# Patient Record
Sex: MALE | Race: WHITE | NOT HISPANIC OR LATINO | Employment: FULL TIME | URBAN - METROPOLITAN AREA
[De-identification: names, ages, dates, MRNs, and addresses within clinical notes are randomized per-mention and may not be internally consistent; named-entity substitution may affect disease eponyms.]

---

## 2017-02-23 ENCOUNTER — TELEPHONE (OUTPATIENT)
Dept: FAMILY MEDICINE | Facility: CLINIC | Age: 42
End: 2017-02-23

## 2017-02-23 NOTE — TELEPHONE ENCOUNTER
2/23/2017    Call Regarding ReattributionPhysical    Attempt 1    Message on voicemail     Comments:       Outreach   CC

## 2017-03-10 NOTE — TELEPHONE ENCOUNTER
3/10/2017    Call Regarding ReattributionPhysical    Attempt 2    Message on voicemail     Comments:           Outreach   loretta

## 2017-03-31 NOTE — TELEPHONE ENCOUNTER
Call Regarding ReattributionPhysical    Attempt 3    Message on voicemail     Comments:       Outreach   Lucia Beckwith

## 2017-08-16 ENCOUNTER — OFFICE VISIT (OUTPATIENT)
Dept: FAMILY MEDICINE | Facility: CLINIC | Age: 42
End: 2017-08-16
Payer: COMMERCIAL

## 2017-08-16 VITALS
OXYGEN SATURATION: 99 % | HEIGHT: 75 IN | BODY MASS INDEX: 25.4 KG/M2 | DIASTOLIC BLOOD PRESSURE: 70 MMHG | WEIGHT: 204.25 LBS | SYSTOLIC BLOOD PRESSURE: 118 MMHG | HEART RATE: 56 BPM | RESPIRATION RATE: 16 BRPM

## 2017-08-16 DIAGNOSIS — Z00.00 ROUTINE GENERAL MEDICAL EXAMINATION AT A HEALTH CARE FACILITY: Primary | ICD-10-CM

## 2017-08-16 DIAGNOSIS — R19.5 CHANGE IN CONSISTENCY OF STOOL: ICD-10-CM

## 2017-08-16 PROCEDURE — 99396 PREV VISIT EST AGE 40-64: CPT | Performed by: FAMILY MEDICINE

## 2017-08-16 NOTE — NURSING NOTE
"Chief Complaint   Patient presents with     Physical       Initial /70 (BP Location: Right arm, Patient Position: Chair, Cuff Size: Adult Large)  Pulse 56  Resp 16  Ht 6' 3\" (1.905 m)  Wt 204 lb 4 oz (92.6 kg)  SpO2 99%  BMI 25.53 kg/m2 Estimated body mass index is 25.53 kg/(m^2) as calculated from the following:    Height as of this encounter: 6' 3\" (1.905 m).    Weight as of this encounter: 204 lb 4 oz (92.6 kg).  Medication Reconciliation: complete     Yoana Cruz, ENRRIQUE      "

## 2017-08-16 NOTE — MR AVS SNAPSHOT
After Visit Summary   8/16/2017    Angie Lopes    MRN: [id number]           Patient Information     Date Of Birth          1975        Visit Information        Provider Department      8/16/2017 8:40 AM Preet Novak MD [facility]        Today's Diagnoses     Routine general medical examination at a health care facility    -  1    Change in consistency of stool          Care Instructions      Preventive Health Recommendations  Male Ages 40 to 49    Yearly exam:             See your health care provider every year in order to  o   Review health changes.   o   Discuss preventive care.    o   Review your medicines if your doctor has prescribed any.    You should be tested each year for STDs (sexually transmitted diseases) if you re at risk.     Have a cholesterol test every 5 years.     Have a colonoscopy (test for colon cancer) if someone in your family has had colon cancer or polyps before age 50.     After age 45, have a diabetes test (fasting glucose). If you are at risk for diabetes, you should have this test every 3 years.      Talk with your health care provider about whether or not a prostate cancer screening test (PSA) is right for you.    Shots: Get a flu shot each year. Get a tetanus shot every 10 years.     Nutrition:    Eat at least 5 servings of fruits and vegetables daily.     Eat whole-grain bread, whole-wheat pasta and brown rice instead of white grains and rice.     Talk to your provider about Calcium and Vitamin D.     Lifestyle    Exercise for at least 150 minutes a week (30 minutes a day, 5 days a week). This will help you control your weight and prevent disease.     Limit alcohol to one drink per day.     No smoking.     Wear sunscreen to prevent skin cancer.     See your dentist every six months for an exam and cleaning.              Follow-ups after your visit        Additional Services     COLORECTAL SURGERY REFERRAL       Your provider has  "referred you to: [facility] [phone]   [email or web address]  [facility] [phone]   [email or web address]    Referral Reason(s): change in stool consistency,   Special Concerns: None  This referral is: Elective (week +)  It is OK to leave a message on patient's voicemail.    Please be aware that coverage of these services is subject to the terms and limitations of your health insurance plan.  Call member services at your health plan with any benefit or coverage questions.      Please bring the following with you to your appointment:    (1) Any X-Rays, CTs or MRIs which have been performed.  Contact the facility where they were done to arrange for  prior to your scheduled appointment.    (2) List of current medications  (3) This referral request   (4) Any documents/labs given to you for this referral                  Who to contact     If you have questions or need follow up information about today's clinic visit or your schedule please contact [facility] directly at [phone].  Normal or non-critical lab and imaging results will be communicated to you by MyChart, letter or phone within 4 business days after the clinic has received the results. If you do not hear from us within 7 days, please contact the clinic through [facility] [employer] [facility] or phone. If you have a critical or abnormal lab result, we will notify you by phone as soon as possible.  Submit refill requests through [employer] or call your pharmacy and they will forward the refill request to us. Please allow 3 business days for your refill to be completed.          Additional Information About Your Visit        [employer] Information     [employer] lets you send messages to your doctor, view your test results, renew your prescriptions, schedule appointments and more. To sign up, go to [email or web address] . Click on \"Log in\" on the left side of the screen, which will take you to the " "Welcome page. Then click on \"Sign up Now\" on the right side of the page.     You will be asked to enter the access code listed below, as well as some personal information. Please follow the directions to create your username and password.     Your access code is: [email or web address]  Expires: 2017  9:13 AM     Your access code will  in 90 days. If you need help or a new code, please call your [facility] clinic or [phone].        Care EveryWhere ID     This is your Care EveryWhere ID. This could be used by other organizations to access your [facility] medical records  [id number]        Your Vitals Were     Pulse Respirations Height Pulse Oximetry BMI (Body Mass Index)       56 16 6' 3\" (1.905 m) 99% 25.53 kg/m2        Blood Pressure from Last 3 Encounters:   17 118/70   01/27/15 122/84   12 110/60    Weight from Last 3 Encounters:   17 204 lb 4 oz (92.6 kg)   01/27/15 199 lb (90.3 kg)   12 197 lb (89.4 kg)              We Performed the Following     COLORECTAL SURGERY REFERRAL        Primary Care Provider Office Phone # Fax #    Preet Margarette Novak [phone] [address] [place] [address]        Equal Access to Services     SEB MICHAEL : Hadnona youngo Sosami, waaxda luqadaha, qaybta kaalkarel hernandez. So [facility] [phone].    ATENCIÓN: Si habla español, tiene a drake disposición servicios gratuitos de asistencia lingüística. Wilfred al [phone].    We comply with applicable federal civil rights laws and [place] laws. We do not discriminate on the basis of race, color, national origin, age, disability sex, sexual orientation or gender identity.            Thank you!     Thank you for choosing [facility]  for your care. Our goal is always to provide you with excellent care. Hearing back from our patients is one way we can continue to improve our services. Please take a few minutes " to complete the written survey that you may receive in the mail after your visit with us. Thank you!             Your Updated Medication List - Protect others around you: Learn how to safely use, store and throw away your medicines at www.disposemymeds.org.          This list is accurate as of: 8/16/17  9:13 AM.  Always use your most recent med list.                   Brand Name Dispense Instructions for use Diagnosis    NO ACTIVE MEDICATIONS      .

## 2017-08-16 NOTE — PROGRESS NOTES
SUBJECTIVE:   CC: Angie Lopes is an 42 year old male who presents for preventative health visit.     Answers for HPI/ROS submitted by the patient on 8/16/2017   Annual Exam:  Getting at least 3 servings of Calcium per day:: Yes  Bi-annual eye exam:: NO  Dental care twice a year:: Yes  Sleep apnea or symptoms of sleep apnea:: None  Diet:: Regular (no restrictions)  Frequency of exercise:: 2-3 days/week  Taking medications regularly:: Not Applicable  Medication side effects:: Not applicable  Additional concerns today:: YES  PHQ-2 Score: 0  Duration of exercise:: 15-30 minutes      Today's PHQ-2 Score:   PHQ-2 ( 1999 Pfizer) 8/16/2017 1/27/2015   Q1: Little interest or pleasure in doing things 0 0   Q2: Feeling down, depressed or hopeless 0 0   PHQ-2 Score 0 0   Q1: Little interest or pleasure in doing things Not at all -   Q2: Feeling down, depressed or hopeless Not at all -   PHQ-2 Score 0 -     Abuse: Current or Past(Physical, Sexual or Emotional)- No  Do you feel safe in your environment - Yes  Social History   Substance Use Topics     Smoking status: Never Smoker     Smokeless tobacco: Never Used     Alcohol use Yes      Comment: occ. wine 3-4 times a week      The patient does not drink >3 drinks per day nor >7 drinks per week.    Last PSA: No results found for: PSA    Reviewed orders with patient. Reviewed health maintenance and updated orders accordingly - Yes       Reviewed and updated as needed this visit by clinical staff    Reviewed and updated as needed this visit by Provider    Sometime mucus in stool. Noticing sometime after passing stool. Could be from hemorrhoid. No blood in stool generally. Sometime bright red with wiping. No constipation or diarrhea. Sometime lower abdominal discomfort which is not new. No urinary symptoms.   Going on for few months.     ROS:  C: NEGATIVE for fever, chills, change in weight  I: NEGATIVE for worrisome rashes, moles or lesions  E: NEGATIVE for vision changes or  "irritation  ENT: NEGATIVE for ear, mouth and throat problems  R: NEGATIVE for significant cough or SOB  CV: NEGATIVE for chest pain, palpitations or peripheral edema  GI: NEGATIVE for nausea, abdominal pain, heartburn, or change in bowel habits   male: negative for dysuria, hematuria, decreased urinary stream, erectile dysfunction, urethral discharge  M: NEGATIVE for significant arthralgias or myalgia  N: NEGATIVE for weakness, dizziness or paresthesias  E: NEGATIVE for temperature intolerance, skin/hair changes  P: NEGATIVE for changes in mood or affect    OBJECTIVE:   /70 (BP Location: Right arm, Patient Position: Chair, Cuff Size: Adult Large)  Pulse 56  Resp 16  Ht 6' 3\" (1.905 m)  Wt 204 lb 4 oz (92.6 kg)  SpO2 99%  BMI 25.53 kg/m2  EXAM:  GENERAL: healthy, alert and no distress  EYES: Eyes grossly normal to inspection, PERRL and conjunctivae and sclerae normal  HENT: ear canals and TM's normal, nose and mouth without ulcers or lesions  NECK: no adenopathy, no asymmetry, masses, or scars and thyroid normal to palpation  RESP: lungs clear to auscultation - no rales, rhonchi or wheezes  CV: regular rate and rhythm, normal S1 S2, no S3 or S4, no murmur, click or rub, no peripheral edema and peripheral pulses strong  ABDOMEN: soft, nontender, no hepatosplenomegaly, no masses and bowel sounds normal   (male): normal male genitalia without lesions or urethral discharge, no hernia  MS: no gross musculoskeletal defects noted, no edema  SKIN: no suspicious lesions or rashes  NEURO: Normal strength and tone, mentation intact and speech normal  PSYCH: mentation appears normal, affect normal/bright    ASSESSMENT/PLAN:       1. Routine general medical examination at a health care facility    2. Change in consistency of stool  With more mucus in stool. History of hemorrhoid. Watch diet and if not improving, see colorectal. I suspect it more of diet related.   - COLORECTAL SURGERY " "REFERRAL      COUNSELING:  Reviewed preventive health counseling, as reflected in patient instructions  Special attention given to:        Regular exercise       Healthy diet/nutrition       Vision screening       Hearing screening           reports that he has never smoked. He has never used smokeless tobacco.      Estimated body mass index is 25.53 kg/(m^2) as calculated from the following:    Height as of this encounter: 6' 3\" (1.905 m).    Weight as of this encounter: 204 lb 4 oz (92.6 kg).         Counseling Resources:  ATP IV Guidelines  Pooled Cohorts Equation Calculator  FRAX Risk Assessment  ICSI Preventive Guidelines  Dietary Guidelines for Americans, 2010  USDA's MyPlate  ASA Prophylaxis  Lung CA Screening    Preet Novak MD, MD  [facility]  "

## 2020-02-03 ENCOUNTER — OFFICE VISIT (OUTPATIENT)
Dept: FAMILY MEDICINE | Facility: CLINIC | Age: 45
End: 2020-02-03
Payer: COMMERCIAL

## 2020-02-03 DIAGNOSIS — Z12.12 SCREENING FOR COLORECTAL CANCER: ICD-10-CM

## 2020-02-03 DIAGNOSIS — D17.1 LIPOMA OF BACK: ICD-10-CM

## 2020-02-03 DIAGNOSIS — N52.9 IMPOTENCE: ICD-10-CM

## 2020-02-03 DIAGNOSIS — H91.90 HEARING LOSS, UNSPECIFIED HEARING LOSS TYPE, UNSPECIFIED LATERALITY: ICD-10-CM

## 2020-02-03 DIAGNOSIS — Z12.11 SCREENING FOR COLORECTAL CANCER: ICD-10-CM

## 2020-02-03 DIAGNOSIS — Z00.00 ROUTINE GENERAL MEDICAL EXAMINATION AT A HEALTH CARE FACILITY: Primary | ICD-10-CM

## 2020-02-03 DIAGNOSIS — H00.015 HORDEOLUM EXTERNUM OF LEFT LOWER EYELID: ICD-10-CM

## 2020-02-03 LAB
ALBUMIN SERPL-MCNC: 4.1 G/DL (ref 3.4–5)
ALP SERPL-CCNC: 65 U/L (ref 40–150)
ALT SERPL W P-5'-P-CCNC: 24 U/L (ref 0–70)
ANION GAP SERPL CALCULATED.3IONS-SCNC: 6 MMOL/L (ref 3–14)
AST SERPL W P-5'-P-CCNC: 14 U/L (ref 0–45)
BILIRUB SERPL-MCNC: 0.7 MG/DL (ref 0.2–1.3)
BUN SERPL-MCNC: 19 MG/DL (ref 7–30)
CALCIUM SERPL-MCNC: 9.6 MG/DL (ref 8.5–10.1)
CHLORIDE SERPL-SCNC: 109 MMOL/L (ref 94–109)
CHOLEST SERPL-MCNC: 182 MG/DL
CO2 SERPL-SCNC: 26 MMOL/L (ref 20–32)
CREAT SERPL-MCNC: 1.24 MG/DL (ref 0.66–1.25)
GFR SERPL CREATININE-BSD FRML MDRD: 70 ML/MIN/{1.73_M2}
GLUCOSE SERPL-MCNC: 93 MG/DL (ref 70–99)
HDLC SERPL-MCNC: 62 MG/DL
LDLC SERPL CALC-MCNC: 109 MG/DL
NONHDLC SERPL-MCNC: 120 MG/DL
POTASSIUM SERPL-SCNC: 4 MMOL/L (ref 3.4–5.3)
PROT SERPL-MCNC: 7.5 G/DL (ref 6.8–8.8)
SODIUM SERPL-SCNC: 141 MMOL/L (ref 133–144)
TRIGL SERPL-MCNC: 54 MG/DL

## 2020-02-03 PROCEDURE — 80053 COMPREHEN METABOLIC PANEL: CPT | Performed by: PHYSICIAN ASSISTANT

## 2020-02-03 PROCEDURE — 99396 PREV VISIT EST AGE 40-64: CPT | Performed by: PHYSICIAN ASSISTANT

## 2020-02-03 PROCEDURE — 87389 HIV-1 AG W/HIV-1&-2 AB AG IA: CPT | Performed by: PHYSICIAN ASSISTANT

## 2020-02-03 PROCEDURE — 84403 ASSAY OF TOTAL TESTOSTERONE: CPT | Performed by: PHYSICIAN ASSISTANT

## 2020-02-03 PROCEDURE — 84270 ASSAY OF SEX HORMONE GLOBUL: CPT | Performed by: PHYSICIAN ASSISTANT

## 2020-02-03 PROCEDURE — 36415 COLL VENOUS BLD VENIPUNCTURE: CPT | Performed by: PHYSICIAN ASSISTANT

## 2020-02-03 PROCEDURE — 80061 LIPID PANEL: CPT | Performed by: PHYSICIAN ASSISTANT

## 2020-02-03 PROCEDURE — 99213 OFFICE O/P EST LOW 20 MIN: CPT | Mod: 25 | Performed by: PHYSICIAN ASSISTANT

## 2020-02-03 ASSESSMENT — ENCOUNTER SYMPTOMS
EYE PAIN: 1
SORE THROAT: 0
CONSTIPATION: 0
FREQUENCY: 0
DIZZINESS: 0
NAUSEA: 0
WEAKNESS: 0
HEMATOCHEZIA: 0
DIARRHEA: 0
PALPITATIONS: 0
SHORTNESS OF BREATH: 0
PARESTHESIAS: 0
JOINT SWELLING: 0
HEADACHES: 0
FEVER: 0
ABDOMINAL PAIN: 0
COUGH: 1
HEARTBURN: 0
ARTHRALGIAS: 0
DYSURIA: 0
NERVOUS/ANXIOUS: 0
CHILLS: 0
HEMATURIA: 0
MYALGIAS: 0

## 2020-02-03 ASSESSMENT — MIFFLIN-ST. JEOR: SCORE: 1911.74

## 2020-02-03 NOTE — PROGRESS NOTES
SUBJECTIVE:   CC: Angie Lopes is an 44 year old male who presents for preventative health visit.     Healthy Habits:     Getting at least 3 servings of Calcium per day:  Yes    Bi-annual eye exam:  NO    Dental care twice a year:  Yes    Sleep apnea or symptoms of sleep apnea:  None    Diet:  Regular (no restrictions)    Frequency of exercise:  1 day/week    Duration of exercise:  15-30 minutes    Taking medications regularly:  Yes    Medication side effects:  None    PHQ-2 Total Score: 0    Additional concerns today:  Yes  Eye Problem    Pertinent negatives include no nausea and no weakness.     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   20 years, have three children  For fun enjoys soccer, plays [id number]     Eye concern  4 weeks, left lower eyelid, seems to be improving  Has not done anything   Has improved 10-20%   Firm lump    Derm Problem  Patient would like to discuss area on back of Left shoulder   He has known lipoma to this area  Has seen dermatology in the past, they tried a steroid injection which did not help  Has been growing in size  Thinking he would like to have it removed    Sexual health   Getting an erection is OK, still has sex drive, firmness and stamina have decreased    Exercise    Reports he has lost momentum, needs to get back on track    Hearing Concern  Brought son in for physical   Was briefly tested by tech for hearing as well  Movies needs subtitles   Wife repeating self     Colorectal cancer screening  Pt's maternal grandmother has h/o colon cancer  Pt would like to discuss plan for colon cancer screening   No first degree relatives with history of colorectal cancer  Patient without symptoms     Today's PHQ-2 Score:   PHQ-2 ( 1999 Pfizer) 2/3/2020   Q1: Little interest or pleasure in doing things 0   Q2: Feeling down, depressed or hopeless 0   PHQ-2 Score 0   Q1: Little interest or  "pleasure in doing things Not at all   Q2: Feeling down, depressed or hopeless Not at all   PHQ-2 Score 0       Abuse: Current or Past(Physical, Sexual or Emotional)- No  Do you feel safe in your environment? Yes        Social History     Tobacco Use     Smoking status: Never Smoker     Smokeless tobacco: Never Used   Substance Use Topics     Alcohol use: Yes     Comment: occ. wine 3-4 times a week          Alcohol Use 2/3/2020   Prescreen: >3 drinks/day or >7 drinks/week? No   Prescreen: >3 drinks/day or >7 drinks/week? -       Last PSA: No results found for: PSA    Reviewed orders with patient. Reviewed health maintenance and updated orders accordingly - Yes      Reviewed and updated as needed this visit by clinical staff  Tobacco  Allergies  Meds  Med Hx  Surg Hx  Fam Hx  Soc Hx        Reviewed and updated as needed this visit by Provider          Review of Systems   Constitutional: Negative for chills and fever.   HENT: Positive for hearing loss. Negative for congestion, ear pain and sore throat.    Eyes: Positive for pain. Negative for visual disturbance.   Respiratory: Positive for cough. Negative for shortness of breath.    Cardiovascular: Negative for chest pain, palpitations and peripheral edema.   Gastrointestinal: Negative for abdominal pain, constipation, diarrhea, heartburn, hematochezia and nausea.   Genitourinary: Negative for discharge, dysuria, frequency, genital sores, hematuria, impotence and urgency.   Musculoskeletal: Negative for arthralgias, joint swelling and myalgias.   Skin: Negative for rash.   Neurological: Negative for dizziness, weakness, headaches and paresthesias.   Psychiatric/Behavioral: Negative for mood changes. The patient is not nervous/anxious.        OBJECTIVE:   BP (P) 120/84   Pulse (P) 56   Temp (P) 98  F (36.7  C) (Oral)   Ht (P) 1.899 m (6' 2.75\")   Wt (P) 94 kg (207 lb 4 oz)   SpO2 (P) 98%   BMI (P) 26.08 kg/m      Physical Exam  GENERAL: healthy, alert and no " distress  EYES: Eyes grossly normal to inspection, PERRL and conjunctivae and sclerae normal  HENT: ear canals and TM's normal, nose and mouth without ulcers or lesions  NECK: no adenopathy, no asymmetry, masses, or scars and thyroid normal to palpation  RESP: lungs clear to auscultation - no rales, rhonchi or wheezes  CV: regular rate and rhythm, normal S1 S2, no S3 or S4, no murmur, click or rub, no peripheral edema and peripheral pulses strong  ABDOMEN: soft, nontender, no hepatosplenomegaly, no masses and bowel sounds normal  MS: no gross musculoskeletal defects noted, no edema  SKIN: no suspicious lesions or rashes  NEURO: Normal strength and tone, mentation intact and speech normal  PSYCH: mentation appears normal, affect normal/bright    Diagnostic Test Results:  Labs reviewed in Epic  No results found for this or any previous visit (from the past 24 hour(s)).    ASSESSMENT/PLAN:   1. Routine general medical examination at a health care facility  - Lipid panel reflex to direct LDL Fasting  - Testosterone Free and Total  - Comprehensive metabolic panel (BMP + Alb, Alk Phos, ALT, AST, Total. Bili, TP)  - AUDIOLOGY ADULT REFERRAL; Future  - HIV Antigen Antibody Combo    2. Hordeolum externum of left lower eyelid  - Has been improving without cares  - Recommend warm compress treatment a few times daily   - Return to care if worsening or not improving     3. Lipoma of back  - Has seen derm for this in the past  - Not needing referral today as already established   - Will follow up with derm for further evaluation and possible removal    4. Impotence  - Wants to check testosterone today  - Checking after 8 AM not always reliable, this was reviewed with patient who wishes to check this morning anyway  - If returns abnormal will have patient return for retest before 8 AM in the future  - If normal, will think about trying ED medication such as Sildenafil or Cialis. Discussed both options with patient today. I do  "think he is leaning more towards Cialis as it offers greater spontaneity.     5. Hearing loss, unspecified hearing loss type, unspecified laterality  - Has hearing concerns, reports abnormal informal screen while at his son's physical recently  - Wife has been concerned he is not hearing well     6. Screening for colorectal cancer  - No first degree relatives with colorectal cancer, maternal grandmother did have CRC   - Not having any symptoms  - Discussed FIT test for reassurance, otherwise waiting until age 50 for routine screening       COUNSELING:   Reviewed preventive health counseling, as reflected in patient instructions       Regular exercise       Healthy diet/nutrition       Hearing screening       Colon cancer screening    Estimated body mass index is 26.08 kg/m  (pended) as calculated from the following:    Height as of this encounter: (P) 1.899 m (6' 2.75\").    Weight as of this encounter: (P) 94 kg (207 lb 4 oz).     Weight management plan: Discussed healthy diet and exercise guidelines     reports that he has never smoked. He has never used smokeless tobacco.      Counseling Resources:  ATP IV Guidelines  Pooled Cohorts Equation Calculator  FRAX Risk Assessment  ICSI Preventive Guidelines  Dietary Guidelines for Americans, 2010  USDA's MyPlate  ASA Prophylaxis  Lung CA Screening    Miguel A Clark PA-C  [facility]  "

## 2020-02-03 NOTE — PATIENT INSTRUCTIONS
Patient Education     Sty (or Stye)  A sty is an infection of the oil gland of the eyelid. It may develop into a small pocket of pus (an abscess). This can cause pain, redness, and swelling. In early stages, a sty is treated with antibiotic cream, eye drops, or a small towel soaked in warm water (a warm compress). More severe cases may need to be opened and drained by a healthcare provider.  Home care    Eye drops or ointment are usually prescribed to treat the infection. Use these as directed.     Artificial tears may also be used to lubricate the eye and make it more comfortable. You can buy these over the counter without a prescription. Talk with your healthcare provider before using any over-the-counter treatment for a sty.    Apply a warm, damp towel to the affected eye for at least 5 minutes, 3 to 4 times a day for a week. Warm compresses open the pores and speed the healing. But if the compresses are too hot, they may burn your eyelid.    Sometimes the sty will drain with this treatment alone. If this happens, keep using the antibiotic until all the redness and swelling are gone.    Wash your hands before and after touching the infected eyelid to avoid spreading the infection.    Don t squeeze or try to break open the sty.  Follow-up care  Follow up with your healthcare provider, or as advised.   When to seek medical advice  Call your healthcare provider right away if any of these occur:    Increase in swelling or redness around the eyelid after 48 to 72 hours    Increase in eye pain or the eyelid blisters    Increase in warmth--the eyelid feels hot    Drainage of blood or thick pus from the sty    Blister on the eyelid    Inability to open the eyelid due to swelling    Fever of 100.4 F (38 C) or above, or as directed by your provider    Vision changes    Headache or stiff neck    The sty comes back  Date Last Reviewed: 8/1/2017 2000-2019 [employer]. [address], [place]  [address]. All rights reserved. This information is not intended as a substitute for professional medical care. Always follow your healthcare professional's instructions.

## 2020-02-04 LAB — HIV 1+2 AB+HIV1 P24 AG SERPL QL IA: NONREACTIVE

## 2020-02-05 LAB
SHBG SERPL-SCNC: 40 NMOL/L (ref 11–80)
TESTOST FREE SERPL-MCNC: 11.71 NG/DL (ref 4.7–24.4)
TESTOST SERPL-MCNC: 608 NG/DL (ref 240–950)

## 2020-02-10 ENCOUNTER — HEALTH MAINTENANCE LETTER (OUTPATIENT)
Age: 45
End: 2020-02-10

## 2020-05-14 ENCOUNTER — HOSPITAL ENCOUNTER (EMERGENCY)
Facility: CLINIC | Age: 45
Discharge: HOME | End: 2020-05-14
Attending: EMERGENCY MEDICINE | Admitting: EMERGENCY MEDICINE
Payer: COMMERCIAL

## 2020-05-14 VITALS
SYSTOLIC BLOOD PRESSURE: 135 MMHG | TEMPERATURE: 97 F | HEART RATE: 76 BPM | DIASTOLIC BLOOD PRESSURE: 62 MMHG | OXYGEN SATURATION: 98 % | RESPIRATION RATE: 16 BRPM

## 2020-05-14 DIAGNOSIS — S81.812A LACERATION OF LEFT LOWER LEG, INITIAL ENCOUNTER: ICD-10-CM

## 2020-05-14 PROCEDURE — 12002 RPR S/N/AX/GEN/TRNK2.6-7.5CM: CPT | Mod: Z6 | Performed by: EMERGENCY MEDICINE

## 2020-05-14 PROCEDURE — 99283 EMERGENCY DEPT VISIT LOW MDM: CPT | Mod: Z6 | Performed by: EMERGENCY MEDICINE

## 2020-05-14 PROCEDURE — 25000125 ZZHC RX 250: Performed by: EMERGENCY MEDICINE

## 2020-05-14 PROCEDURE — 90471 IMMUNIZATION ADMIN: CPT | Performed by: EMERGENCY MEDICINE

## 2020-05-14 PROCEDURE — 90715 TDAP VACCINE 7 YRS/> IM: CPT | Performed by: EMERGENCY MEDICINE

## 2020-05-14 PROCEDURE — 25000128 H RX IP 250 OP 636: Performed by: EMERGENCY MEDICINE

## 2020-05-14 PROCEDURE — 99284 EMERGENCY DEPT VISIT MOD MDM: CPT | Mod: 25 | Performed by: EMERGENCY MEDICINE

## 2020-05-14 PROCEDURE — 12002 RPR S/N/AX/GEN/TRNK2.6-7.5CM: CPT | Performed by: EMERGENCY MEDICINE

## 2020-05-14 RX ORDER — LIDOCAINE HYDROCHLORIDE AND EPINEPHRINE 10; 10 MG/ML; UG/ML
30 INJECTION, SOLUTION INFILTRATION; PERINEURAL ONCE
Status: COMPLETED | OUTPATIENT
Start: 2020-05-14 | End: 2020-05-14

## 2020-05-14 RX ADMIN — CLOSTRIDIUM TETANI TOXOID ANTIGEN (FORMALDEHYDE INACTIVATED), CORYNEBACTERIUM DIPHTHERIAE TOXOID ANTIGEN (FORMALDEHYDE INACTIVATED), BORDETELLA PERTUSSIS TOXOID ANTIGEN (GLUTARALDEHYDE INACTIVATED), BORDETELLA PERTUSSIS FILAMENTOUS HEMAGGLUTININ ANTIGEN (FORMALDEHYDE INACTIVATED), BORDETELLA PERTUSSIS PERTACTIN ANTIGEN, AND BORDETELLA PERTUSSIS FIMBRIAE 2/3 ANTIGEN 0.5 ML: 5; 2; 2.5; 5; 3; 5 INJECTION, SUSPENSION INTRAMUSCULAR at 18:30

## 2020-05-14 RX ADMIN — LIDOCAINE HYDROCHLORIDE AND EPINEPHRINE 30 ML: 10; 10 INJECTION, SOLUTION INFILTRATION; PERINEURAL at 18:31

## 2020-05-14 NOTE — ED AVS SNAPSHOT
[facility], [place], Emergency Department  [address]  [place] [address]  Phone:  [phone]  Fax:  [phone]                                    Angie Lopes   MRN: [id number]    Department:  [facility], Emergency Department   Date of Visit:  5/14/2020           After Visit Summary Signature Page    I have received my discharge instructions, and my questions have been answered. I have discussed any challenges I see with this plan with the nurse or doctor.    ..........................................................................................................................................  Patient/Patient Representative Signature      ..........................................................................................................................................  Patient Representative Print Name and Relationship to Patient    ..................................................               ................................................  Date                                   Time    ..........................................................................................................................................  Reviewed by Signature/Title    ...................................................              ..............................................  Date                                               Time          22EPIC Rev 08/18

## 2020-05-14 NOTE — ED PROVIDER NOTES
[facility] EMERGENCY DEPARTMENT ([facility])    5/14/20        History     Chief Complaint   Patient presents with     Laceration     The history is provided by the patient and medical records.     Angie Lopes is a 45 year old male with no significant past medical history who presents here to the Emergency Department due to a laceration to his left lower leg.  Patient reports that he was chopping wood with an axe earlier today when he accidentally struck his left lower leg causing a laceration to the area.  On arrival to the ED patient's bleeding is controlled.  Patient denies any other injuries.  He states that the laceration is the only injury that he sustained.  He is able to ambulate and bear weight on the leg.  He is also able to move his leg without issue.    I have reviewed the Medications, Allergies, Past Medical and Surgical History, and Social History in the Simple [employer] system.  PAST MEDICAL HISTORY: History reviewed. No pertinent past medical history.    PAST SURGICAL HISTORY:   Past Surgical History:   Procedure Laterality Date     VASECTOMY  2004       Past medical history, past surgical history, medications, and allergies were reviewed with the patient. Additional pertinent items: None    FAMILY HISTORY:   Family History   Problem Relation Age of Onset     Lipids Mother      Hypertension Mother      Cancer - colorectal Maternal Grandmother        SOCIAL HISTORY:   Social History     Tobacco Use     Smoking status: Never Smoker     Smokeless tobacco: Never Used   Substance Use Topics     Alcohol use: Yes     Comment: occ. wine 3-4 times a week      Social history was reviewed with the patient. Additional pertinent items: None      Discharge Medication List as of 5/14/2020  7:08 PM      CONTINUE these medications which have NOT CHANGED    Details   NO ACTIVE MEDICATIONS ., Historical              No Known Allergies     Review of Systems  A complete review of systems was performed with pertinent  positives and negatives noted in the HPI, and all other systems negative.    Physical Exam   BP: 135/60  Pulse: 77  Temp: 96.7  F (35.9  C)  Resp: 18  SpO2: 97 %      Physical Exam  Constitutional:       General: He is not in acute distress.     Appearance: He is well-developed. He is not diaphoretic.      Comments: Comfortably resting, lying in bed, NAD, nondiaphoretic, lucid, fully conversant, no  respiratory distress, alert and oriented.     HENT:      Head: Normocephalic and atraumatic.   Eyes:      General: No scleral icterus.  Neck:      Musculoskeletal: Normal range of motion and neck supple.   Cardiovascular:      Rate and Rhythm: Normal rate.      Pulses:           Dorsalis pedis pulses are 2+ on the right side and 2+ on the left side.        Posterior tibial pulses are 2+ on the right side and 2+ on the left side.   Pulmonary:      Effort: Pulmonary effort is normal.   Musculoskeletal: Normal range of motion.   Skin:     General: Skin is warm and dry.      Capillary Refill: Capillary refill takes less than 2 seconds.      Findings: Laceration present. No rash.          Neurological:      Mental Status: He is alert and oriented to person, place, and time.         ED Course        Procedures             Narrative: Procedure: Laceration Repair        LACERATION:  A simple clean 5 cm laceration.      LOCATION:  Left calf      FUNCTION:  Distally sensation, circulation, motor and tendon function are intact.      ANESTHESIA:  Local using lido w/epi total of 10 mLs      PREPARATION:  Irrigation with Normal Saline      DEBRIDEMENT:  no debridement      CLOSURE:  Wound was closed with One Layer.  Skin closed with 7 x 3.0 Ethylon using vertical mattress sutures.        Wound inspected under direct bright light with good visualization. Area with linear  laceration across soft tissue through adipose without exposure of muscle belly or  tendon_. No overt foreign body. Area hemostatic. Neurovascular exam congruent  with  above. Area extensively irrigated with sterile normal saline under pressure. Laceration  repaired in simple fashion as below (please see procedure note for further details)_.  Patient tolerated procedure well and neurovascular exam intact and unchanged post  repair with intact distal pulses and cap refill_. Cautious return precautions discussed w/  full understanding. Wound care discussed. Prompt follow up with primary care  physician discussed and return for suture removal.                  No results found for this or any previous visit (from the past 24 hour(s)).  Medications   lidocaine 1% with EPINEPHrine 1:100,000 injection 30 mL (30 mLs Intradermal Given 5/14/20 1831)   Tdap (tetanus-diphtheria-acell pertussis) (ADACEL) injection 0.5 mL (0.5 mLs Intramuscular Given 5/14/20 1830)             Assessments & Plan (with Medical Decision Making)     This is a 45-year-old male coming into the emergency room with a 5 cm laceration to the left mid calf.  The laceration is full-thickness and goes all the way to the tendon belly.  There is no active bleeding at this time.  Patient has good pulse motor and sensory throughout.  There is no debris within the wound.  The wound was irrigated and anesthetized.  7 sutures were placed with vertical mattress as well as simple.  The patient was provided with aftercare instructions and reasons to return to the emergency room.  His wife is a nurse practitioner and will most likely reassess the wound and if she finds it too complicated they will bring him back to the emergency room for suture removal or reassessment.    Pt was discharged home/self-care.  PT was provided written discharge instructions. Additionally verbal instructions were given and discussed with patient.  PT was asked to return to the ED immediately for any new or concerning symptoms.  Pt was in agreement, endorsed understanding, and questions were answered.       I have reviewed the nursing notes.    I have  "reviewed the findings, diagnosis, plan and need for follow up with the patient.    Discharge Medication List as of 5/14/2020  7:08 PM          Final diagnoses:   Laceration of left lower leg, initial encounter     IJarvis, am serving as a trained medical scribe to document services personally performed by Fede Cespedes MD, based on the provider's statements to me.   IFede MD, was physically present and have reviewed and verified the accuracy of this note documented by Jarvis Hall.    \"This dictation was performed with the assistance of voice recognition software and may contain inadvertant transcription  errors,  omissions and/or  inadvertent word substitution.\" --Fede Cespedes MD     5/14/2020   [facility], [place], EMERGENCY DEPARTMENT     Fede Cespedes MD  05/14/20 1921       Fede Cespedes MD  05/14/20 1921    "

## 2020-05-15 NOTE — DISCHARGE INSTRUCTIONS
Please make an appointment to follow up with Your Primary Care Provider for suture removal in 14 days.  At the 14-day tim remove every other suture for day or 2 and then the rest of then, come out so long as the wound is not pulling apart.

## 2020-11-14 ENCOUNTER — HEALTH MAINTENANCE LETTER (OUTPATIENT)
Age: 45
End: 2020-11-14

## 2020-11-23 ENCOUNTER — TRANSFERRED RECORDS (OUTPATIENT)
Dept: HEALTH INFORMATION MANAGEMENT | Facility: CLINIC | Age: 45
End: 2020-11-23

## 2021-04-03 ENCOUNTER — HEALTH MAINTENANCE LETTER (OUTPATIENT)
Age: 46
End: 2021-04-03

## 2021-06-02 ENCOUNTER — OFFICE VISIT (OUTPATIENT)
Dept: FAMILY MEDICINE | Facility: CLINIC | Age: 46
End: 2021-06-02
Payer: COMMERCIAL

## 2021-06-02 VITALS
HEART RATE: 70 BPM | RESPIRATION RATE: 16 BRPM | SYSTOLIC BLOOD PRESSURE: 120 MMHG | DIASTOLIC BLOOD PRESSURE: 72 MMHG | HEIGHT: 73 IN | BODY MASS INDEX: 28.1 KG/M2 | WEIGHT: 212 LBS | TEMPERATURE: 97.2 F | OXYGEN SATURATION: 97 %

## 2021-06-02 DIAGNOSIS — R00.2 PALPITATIONS: ICD-10-CM

## 2021-06-02 DIAGNOSIS — Z00.00 ROUTINE GENERAL MEDICAL EXAMINATION AT A HEALTH CARE FACILITY: Primary | ICD-10-CM

## 2021-06-02 DIAGNOSIS — N52.9 ERECTILE DYSFUNCTION, UNSPECIFIED ERECTILE DYSFUNCTION TYPE: ICD-10-CM

## 2021-06-02 DIAGNOSIS — E07.9 ASYMMETRICAL THYROID: ICD-10-CM

## 2021-06-02 DIAGNOSIS — Z11.59 NEED FOR HEPATITIS C SCREENING TEST: ICD-10-CM

## 2021-06-02 PROCEDURE — 99396 PREV VISIT EST AGE 40-64: CPT | Performed by: PHYSICIAN ASSISTANT

## 2021-06-02 PROCEDURE — 99213 OFFICE O/P EST LOW 20 MIN: CPT | Mod: 25 | Performed by: PHYSICIAN ASSISTANT

## 2021-06-02 RX ORDER — SILDENAFIL CITRATE 20 MG/1
TABLET ORAL
Qty: 20 TABLET | Refills: 0 | Status: SHIPPED | OUTPATIENT
Start: 2021-06-02 | End: 2021-10-22

## 2021-06-02 ASSESSMENT — ENCOUNTER SYMPTOMS
HEMATURIA: 0
COUGH: 0
NAUSEA: 0
SHORTNESS OF BREATH: 0
ABDOMINAL PAIN: 0
EYE PAIN: 0
CHILLS: 0
PARESTHESIAS: 0
PALPITATIONS: 1
JOINT SWELLING: 0
HEADACHES: 1
HEMATOCHEZIA: 0
DYSURIA: 0
DIARRHEA: 0
FEVER: 0
HEARTBURN: 0
SORE THROAT: 0
FREQUENCY: 0
MYALGIAS: 0
ARTHRALGIAS: 0
WEAKNESS: 0
CONSTIPATION: 0
DIZZINESS: 0

## 2021-06-02 ASSESSMENT — MIFFLIN-ST. JEOR: SCORE: 1887.57

## 2021-06-02 NOTE — PATIENT INSTRUCTIONS
Preventive Health Recommendations  Male Ages 40 to 49    Yearly exam:             See your health care provider every year in order to  o   Review health changes.   o   Discuss preventive care.    o   Review your medicines if your doctor has prescribed any.    You should be tested each year for STDs (sexually transmitted diseases) if you re at risk.     Have a cholesterol test every 5 years.     Have a colonoscopy (test for colon cancer) if someone in your family has had colon cancer or polyps before age 50.     After age 45, have a diabetes test (fasting glucose). If you are at risk for diabetes, you should have this test every 3 years.      Talk with your health care provider about whether or not a prostate cancer screening test (PSA) is right for you.    Shots: Get a flu shot each year. Get a tetanus shot every 10 years.     Nutrition:    Eat at least 5 servings of fruits and vegetables daily.     Eat whole-grain bread, whole-wheat pasta and brown rice instead of white grains and rice.     Get adequate Calcium and Vitamin D.     Lifestyle    Exercise for at least 150 minutes a week (30 minutes a day, 5 days a week). This will help you control your weight and prevent disease.     Limit alcohol to one drink per day.     No smoking.     Wear sunscreen to prevent skin cancer.     See your dentist every six months for an exam and cleaning.        To schedule heart holter monitor please call [phone]

## 2021-06-02 NOTE — PROGRESS NOTES
SUBJECTIVE:   CC: Angie Lopes is an 46 year old male who presents for preventative health visit.   Patient has been advised of split billing requirements and indicates understanding: Yes  Healthy Habits:     Getting at least 3 servings of Calcium per day:  NO    Bi-annual eye exam:  NO    Dental care twice a year:  Yes    Sleep apnea or symptoms of sleep apnea:  Sleep apnea    Diet:  Regular (no restrictions)    Frequency of exercise:  2-3 days/week    Duration of exercise:  30-45 minutes    Taking medications regularly:  Not Applicable    Barriers to taking medications:  None    Medication side effects:  Not applicable    PHQ-2 Total Score: 0    Additional concerns today:  Yes    Angie is a very pleasant 46 year old male with a history of scaphoid fracture and hemorrhoids who is presenting for routine physical    Overall he is doing well. Was laid off during pandemic but ended up being a good thing as he found a new job that has been a great deal better than previous career. Works in IT.   His three children have been at home during the pandemic. Has one high school aged child and others are college or post college. Has been going well with everyone back home.     Reports he is feeling his age more these days. Has some additional concerns today:    Palpitations: reporting episodes of palpitations in March and April 2021. Occurred at the end of the day. Feeling like a loud thump and irregular. Heart felt like racing. Thinks some correlation to stress. Does wear smart watch which showed a fib. This was concerning as his father was recently diagnosed with a fib. More recently Angie has not been having any of these symptoms.     ED: able to obtain erection but reports firmness has been an issue. We discussed this last year but did not start medication.     HM: due for repeat colonoscopy in 2025. Had some rectal bleeding preceding last screening. Two polyps. Also hemorrhoids. Bleeding thought to be due to  hemorrhoids.         Today's PHQ-2 Score:   PHQ-2 ( 1999 Pfizer) 6/2/2021   Q1: Little interest or pleasure in doing things 0   Q2: Feeling down, depressed or hopeless 0   PHQ-2 Score 0   Q1: Little interest or pleasure in doing things Not at all   Q2: Feeling down, depressed or hopeless Not at all   PHQ-2 Score 0       Abuse: Current or Past(Physical, Sexual or Emotional)- No  Do you feel safe in your environment? Yes    Have you ever done Advance Care Planning? (For example, a Health Directive, POLST, or a discussion with a medical provider or your loved ones about your wishes): No, advance care planning information given to patient to review.  Advanced care planning was discussed at today's visit.    Social History     Tobacco Use     Smoking status: Never Smoker     Smokeless tobacco: Never Used   Substance Use Topics     Alcohol use: Yes     Comment: occ. wine 3-4 times a week      If you drink alcohol do you typically have >3 drinks per day or >7 drinks per week? No    Alcohol Use 6/2/2021   Prescreen: >3 drinks/day or >7 drinks/week? No   Prescreen: >3 drinks/day or >7 drinks/week? -   No flowsheet data found.    Last PSA: No results found for: PSA    Reviewed orders with patient. Reviewed health maintenance and updated orders accordingly - Yes      Reviewed and updated as needed this visit by clinical staff  Tobacco  Allergies  Meds   Med Hx  Surg Hx  Fam Hx  Soc Hx        Reviewed and updated as needed this visit by Provider                  Review of Systems   Constitutional: Negative for chills and fever.   HENT: Positive for hearing loss. Negative for congestion, ear pain and sore throat.    Eyes: Negative for pain and visual disturbance.   Respiratory: Negative for cough and shortness of breath.    Cardiovascular: Positive for palpitations. Negative for chest pain and peripheral edema.   Gastrointestinal: Negative for abdominal pain, constipation, diarrhea, heartburn, hematochezia and nausea.  "  Genitourinary: Positive for impotence. Negative for discharge, dysuria, frequency, genital sores, hematuria and urgency.   Musculoskeletal: Negative for arthralgias, joint swelling and myalgias.   Skin: Negative for rash.   Neurological: Positive for headaches. Negative for dizziness, weakness and paresthesias.   Psychiatric/Behavioral: Negative for mood changes.     OBJECTIVE:   /72 (BP Location: Left arm, Patient Position: Sitting, Cuff Size: Adult Regular)   Pulse 70   Temp 97.2  F (36.2  C) (Tympanic)   Resp 16   Ht 1.842 m (6' 0.5\")   Wt 96.2 kg (212 lb)   SpO2 97%   BMI 28.36 kg/m      Physical Exam  Vitals signs and nursing note reviewed.   Constitutional:       Appearance: Normal appearance.   HENT:      Head: Normocephalic and atraumatic.      Right Ear: Tympanic membrane, ear canal and external ear normal.      Left Ear: Tympanic membrane, ear canal and external ear normal.      Mouth/Throat:      Mouth: Mucous membranes are moist.      Pharynx: No posterior oropharyngeal erythema.   Eyes:      Conjunctiva/sclera: Conjunctivae normal.      Pupils: Pupils are equal, round, and reactive to light.   Neck:      Musculoskeletal: Neck supple. No muscular tenderness.      Comments: Thyroid asymmetry R > L, non tender  Cardiovascular:      Rate and Rhythm: Normal rate and regular rhythm.      Heart sounds: Normal heart sounds.   Pulmonary:      Effort: Pulmonary effort is normal.      Breath sounds: Normal breath sounds.   Abdominal:      General: Abdomen is flat. Bowel sounds are normal.      Palpations: Abdomen is soft.      Tenderness: There is no abdominal tenderness.   Musculoskeletal: Normal range of motion.   Skin:     General: Skin is warm and dry.   Neurological:      General: No focal deficit present.      Mental Status: He is alert.   Psychiatric:         Mood and Affect: Mood normal.         Behavior: Behavior normal.         Diagnostic Test Results:  Labs reviewed in Epic  No results " found for this or any previous visit (from the past 24 hour(s)).    ASSESSMENT/PLAN:   Angie was seen today for physical.    Diagnoses and all orders for this visit:    Routine general medical examination at a health care facility  -     REVIEW OF HEALTH MAINTENANCE PROTOCOL ORDERS  -     Lipid panel reflex to direct LDL Fasting; Future  -     Vitamin D Deficiency; Future  -     Comprehensive metabolic panel (BMP + Alb, Alk Phos, ALT, AST, Total. Bili, TP); Future  -     **Testosterone Free and Total FUTURE anytime; Future  -     **TSH with free T4 reflex FUTURE anytime; Future  -     **CBC with platelets FUTURE anytime; Future  -     Hepatitis C antibody; Future    Palpitations  -     Leadless EKG Monitor 3 to 7 Days; Future  -     OFFICE/OUTPT VISIT,EST,LEVL III    Erectile dysfunction, unspecified erectile dysfunction type  -     Lipid panel reflex to direct LDL Fasting; Future  -     Comprehensive metabolic panel (BMP + Alb, Alk Phos, ALT, AST, Total. Bili, TP); Future  -     **Testosterone Free and Total FUTURE anytime; Future  -     sildenafil (REVATIO) 20 MG tablet; Take 1-5 tablets 30-60 minutes prior to activity. Do not take with nitroglycerin, doxazosin, or terazosin.  -     **TSH with free T4 reflex FUTURE anytime; Future  -     OFFICE/OUTPT VISIT,EST,LEVL III    Need for hepatitis C screening test  -     Hepatitis C antibody; Future    Asymmetrical thyroid  -     **TSH with free T4 reflex FUTURE anytime; Future      Palpitations have resolved but were quite bothersome in the months of March and April 2021. Will continue to monitor symptoms. Seems to have some relationship to stress. Deferred EKG in clinic today as not reporting symptoms. Heart sounds and vitals normal on exam. Will place order for Zio patch -- Angie will schedule if symptoms return. To ED if any chest pain, shortness of breath, focal neuro deficit, dizziness, syncope.     For ED will try course of Sildenafil. If working well I will send  "in a longer term supply.     Thyroid asymmetry appreciated on exam with R > L. Will place order for TSH. Could consider US in the future.     Future orders for lipid panel, vit d, CMP, testosterone, TSH, CBC, hep c       COUNSELING:   Reviewed preventive health counseling, as reflected in patient instructions       Regular exercise       Healthy diet/nutrition       Consider Hep C screening for all patients one time for ages 18-79 years    Estimated body mass index is 28.36 kg/m  as calculated from the following:    Height as of this encounter: 1.842 m (6' 0.5\").    Weight as of this encounter: 96.2 kg (212 lb).     Weight management plan: Discussed healthy diet and exercise guidelines    He reports that he has never smoked. He has never used smokeless tobacco.      Counseling Resources:  ATP IV Guidelines  Pooled Cohorts Equation Calculator  FRAX Risk Assessment  ICSI Preventive Guidelines  Dietary Guidelines for Americans, 2010  USDA's MyPlate  ASA Prophylaxis  Lung CA Screening    Miguel A Clark PA-C  [facility]  "

## 2021-06-11 DIAGNOSIS — E07.9 ASYMMETRICAL THYROID: ICD-10-CM

## 2021-06-11 DIAGNOSIS — Z00.00 ROUTINE GENERAL MEDICAL EXAMINATION AT A HEALTH CARE FACILITY: ICD-10-CM

## 2021-06-11 DIAGNOSIS — Z11.59 NEED FOR HEPATITIS C SCREENING TEST: ICD-10-CM

## 2021-06-11 DIAGNOSIS — N52.9 ERECTILE DYSFUNCTION, UNSPECIFIED ERECTILE DYSFUNCTION TYPE: ICD-10-CM

## 2021-06-11 LAB
ALBUMIN SERPL-MCNC: 3.8 G/DL (ref 3.4–5)
ALP SERPL-CCNC: 77 U/L (ref 40–150)
ALT SERPL W P-5'-P-CCNC: 28 U/L (ref 0–70)
ANION GAP SERPL CALCULATED.3IONS-SCNC: 7 MMOL/L (ref 3–14)
AST SERPL W P-5'-P-CCNC: 15 U/L (ref 0–45)
BILIRUB SERPL-MCNC: 0.3 MG/DL (ref 0.2–1.3)
BUN SERPL-MCNC: 16 MG/DL (ref 7–30)
CALCIUM SERPL-MCNC: 9.1 MG/DL (ref 8.5–10.1)
CHLORIDE SERPL-SCNC: 107 MMOL/L (ref 94–109)
CHOLEST SERPL-MCNC: 172 MG/DL
CO2 SERPL-SCNC: 28 MMOL/L (ref 20–32)
CREAT SERPL-MCNC: 1.18 MG/DL (ref 0.66–1.25)
ERYTHROCYTE [DISTWIDTH] IN BLOOD BY AUTOMATED COUNT: 13.9 % (ref 10–15)
GFR SERPL CREATININE-BSD FRML MDRD: 73 ML/MIN/{1.73_M2}
GLUCOSE SERPL-MCNC: 79 MG/DL (ref 70–99)
HCT VFR BLD AUTO: 41.8 % (ref 40–53)
HDLC SERPL-MCNC: 66 MG/DL
HGB BLD-MCNC: 13.3 G/DL (ref 13.3–17.7)
LDLC SERPL CALC-MCNC: 97 MG/DL
MCH RBC QN AUTO: 25.3 PG (ref 26.5–33)
MCHC RBC AUTO-ENTMCNC: 31.8 G/DL (ref 31.5–36.5)
MCV RBC AUTO: 80 FL (ref 78–100)
NONHDLC SERPL-MCNC: 106 MG/DL
PLATELET # BLD AUTO: 222 10E9/L (ref 150–450)
POTASSIUM SERPL-SCNC: 4 MMOL/L (ref 3.4–5.3)
PROT SERPL-MCNC: 7.2 G/DL (ref 6.8–8.8)
RBC # BLD AUTO: 5.26 10E12/L (ref 4.4–5.9)
SODIUM SERPL-SCNC: 142 MMOL/L (ref 133–144)
TRIGL SERPL-MCNC: 43 MG/DL
TSH SERPL DL<=0.005 MIU/L-ACNC: 2.1 MU/L (ref 0.4–4)
WBC # BLD AUTO: 4.7 10E9/L (ref 4–11)

## 2021-06-11 PROCEDURE — 82306 VITAMIN D 25 HYDROXY: CPT | Performed by: PHYSICIAN ASSISTANT

## 2021-06-11 PROCEDURE — 99000 SPECIMEN HANDLING OFFICE-LAB: CPT | Performed by: PHYSICIAN ASSISTANT

## 2021-06-11 PROCEDURE — 84403 ASSAY OF TOTAL TESTOSTERONE: CPT | Mod: 90 | Performed by: PHYSICIAN ASSISTANT

## 2021-06-11 PROCEDURE — 80061 LIPID PANEL: CPT | Performed by: PHYSICIAN ASSISTANT

## 2021-06-11 PROCEDURE — 36415 COLL VENOUS BLD VENIPUNCTURE: CPT | Performed by: PHYSICIAN ASSISTANT

## 2021-06-11 PROCEDURE — 85027 COMPLETE CBC AUTOMATED: CPT | Performed by: PHYSICIAN ASSISTANT

## 2021-06-11 PROCEDURE — 84443 ASSAY THYROID STIM HORMONE: CPT | Performed by: PHYSICIAN ASSISTANT

## 2021-06-11 PROCEDURE — 86803 HEPATITIS C AB TEST: CPT | Performed by: PHYSICIAN ASSISTANT

## 2021-06-11 PROCEDURE — 84270 ASSAY OF SEX HORMONE GLOBUL: CPT | Performed by: PHYSICIAN ASSISTANT

## 2021-06-11 PROCEDURE — 80053 COMPREHEN METABOLIC PANEL: CPT | Performed by: PHYSICIAN ASSISTANT

## 2021-06-12 LAB
DEPRECATED CALCIDIOL+CALCIFEROL SERPL-MC: 26 UG/L (ref 20–75)
HCV AB SERPL QL IA: ABNORMAL

## 2021-06-15 LAB
SHBG SERPL-SCNC: 48 NMOL/L (ref 11–80)
TESTOST FREE SERPL-MCNC: 9.26 NG/DL (ref 4.7–24.4)
TESTOST SERPL-MCNC: 554 NG/DL (ref 240–950)

## 2021-09-12 ENCOUNTER — HEALTH MAINTENANCE LETTER (OUTPATIENT)
Age: 46
End: 2021-09-12

## 2021-10-21 ENCOUNTER — APPOINTMENT (OUTPATIENT)
Dept: GENERAL RADIOLOGY | Facility: CLINIC | Age: 46
End: 2021-10-21
Attending: EMERGENCY MEDICINE
Payer: COMMERCIAL

## 2021-10-21 ENCOUNTER — HOSPITAL ENCOUNTER (EMERGENCY)
Facility: CLINIC | Age: 46
Discharge: HOME | End: 2021-10-21
Attending: EMERGENCY MEDICINE | Admitting: EMERGENCY MEDICINE
Payer: COMMERCIAL

## 2021-10-21 ENCOUNTER — NURSE TRIAGE (OUTPATIENT)
Dept: NURSING | Facility: CLINIC | Age: 46
End: 2021-10-21

## 2021-10-21 VITALS
TEMPERATURE: 98.8 F | OXYGEN SATURATION: 99 % | DIASTOLIC BLOOD PRESSURE: 88 MMHG | SYSTOLIC BLOOD PRESSURE: 123 MMHG | RESPIRATION RATE: 8 BRPM | HEART RATE: 88 BPM

## 2021-10-21 DIAGNOSIS — I48.0 PAROXYSMAL ATRIAL FIBRILLATION (H): ICD-10-CM

## 2021-10-21 DIAGNOSIS — I48.0 PAF (PAROXYSMAL ATRIAL FIBRILLATION) (H): Primary | ICD-10-CM

## 2021-10-21 LAB
ALBUMIN SERPL-MCNC: 3.8 G/DL (ref 3.4–5)
ALP SERPL-CCNC: 62 U/L (ref 40–150)
ALT SERPL W P-5'-P-CCNC: 33 U/L (ref 0–70)
ANION GAP SERPL CALCULATED.3IONS-SCNC: 4 MMOL/L (ref 3–14)
AST SERPL W P-5'-P-CCNC: 17 U/L (ref 0–45)
BASOPHILS # BLD AUTO: 0 10E3/UL (ref 0–0.2)
BASOPHILS NFR BLD AUTO: 1 %
BILIRUB SERPL-MCNC: 0.8 MG/DL (ref 0.2–1.3)
BUN SERPL-MCNC: 15 MG/DL (ref 7–30)
CALCIUM SERPL-MCNC: 9.2 MG/DL (ref 8.5–10.1)
CHLORIDE BLD-SCNC: 108 MMOL/L (ref 94–109)
CO2 SERPL-SCNC: 28 MMOL/L (ref 20–32)
CREAT SERPL-MCNC: 1.29 MG/DL (ref 0.66–1.25)
EOSINOPHIL # BLD AUTO: 0 10E3/UL (ref 0–0.7)
EOSINOPHIL NFR BLD AUTO: 1 %
ERYTHROCYTE [DISTWIDTH] IN BLOOD BY AUTOMATED COUNT: 14 % (ref 10–15)
GFR SERPL CREATININE-BSD FRML MDRD: 66 ML/MIN/1.73M2
GLUCOSE BLD-MCNC: 98 MG/DL (ref 70–99)
HCT VFR BLD AUTO: 35 % (ref 40–53)
HGB BLD-MCNC: 11.1 G/DL (ref 13.3–17.7)
HOLD SPECIMEN: NORMAL
IMM GRANULOCYTES # BLD: 0 10E3/UL
IMM GRANULOCYTES NFR BLD: 0 %
INR PPP: 1.02 (ref 0.86–1.14)
LYMPHOCYTES # BLD AUTO: 1.5 10E3/UL (ref 0.8–5.3)
LYMPHOCYTES NFR BLD AUTO: 36 %
MAGNESIUM SERPL-MCNC: 2.2 MG/DL (ref 1.6–2.3)
MCH RBC QN AUTO: 25.8 PG (ref 26.5–33)
MCHC RBC AUTO-ENTMCNC: 31.7 G/DL (ref 31.5–36.5)
MCV RBC AUTO: 81 FL (ref 78–100)
MONOCYTES # BLD AUTO: 0.4 10E3/UL (ref 0–1.3)
MONOCYTES NFR BLD AUTO: 10 %
NEUTROPHILS # BLD AUTO: 2.2 10E3/UL (ref 1.6–8.3)
NEUTROPHILS NFR BLD AUTO: 52 %
NRBC # BLD AUTO: 0 10E3/UL
NRBC BLD AUTO-RTO: 0 /100
NT-PROBNP SERPL-MCNC: 705 PG/ML (ref 0–450)
PLATELET # BLD AUTO: 188 10E3/UL (ref 150–450)
POTASSIUM BLD-SCNC: 4.1 MMOL/L (ref 3.4–5.3)
PROT SERPL-MCNC: 6.9 G/DL (ref 6.8–8.8)
RBC # BLD AUTO: 4.31 10E6/UL (ref 4.4–5.9)
SODIUM SERPL-SCNC: 140 MMOL/L (ref 133–144)
TROPONIN I SERPL-MCNC: <0.015 UG/L (ref 0–0.04)
TSH SERPL DL<=0.005 MIU/L-ACNC: 1.08 MU/L (ref 0.4–4)
WBC # BLD AUTO: 4.2 10E3/UL (ref 4–11)

## 2021-10-21 PROCEDURE — 85004 AUTOMATED DIFF WBC COUNT: CPT | Performed by: EMERGENCY MEDICINE

## 2021-10-21 PROCEDURE — 71045 X-RAY EXAM CHEST 1 VIEW: CPT

## 2021-10-21 PROCEDURE — 84443 ASSAY THYROID STIM HORMONE: CPT | Performed by: EMERGENCY MEDICINE

## 2021-10-21 PROCEDURE — 85610 PROTHROMBIN TIME: CPT | Performed by: EMERGENCY MEDICINE

## 2021-10-21 PROCEDURE — 82040 ASSAY OF SERUM ALBUMIN: CPT | Performed by: EMERGENCY MEDICINE

## 2021-10-21 PROCEDURE — 93005 ELECTROCARDIOGRAM TRACING: CPT

## 2021-10-21 PROCEDURE — 36415 COLL VENOUS BLD VENIPUNCTURE: CPT | Performed by: EMERGENCY MEDICINE

## 2021-10-21 PROCEDURE — 99285 EMERGENCY DEPT VISIT HI MDM: CPT | Mod: 25

## 2021-10-21 PROCEDURE — 83880 ASSAY OF NATRIURETIC PEPTIDE: CPT | Performed by: EMERGENCY MEDICINE

## 2021-10-21 PROCEDURE — 93010 ELECTROCARDIOGRAM REPORT: CPT | Performed by: EMERGENCY MEDICINE

## 2021-10-21 PROCEDURE — 83735 ASSAY OF MAGNESIUM: CPT | Performed by: EMERGENCY MEDICINE

## 2021-10-21 PROCEDURE — 84484 ASSAY OF TROPONIN QUANT: CPT | Performed by: EMERGENCY MEDICINE

## 2021-10-21 PROCEDURE — 71045 X-RAY EXAM CHEST 1 VIEW: CPT | Mod: 26 | Performed by: RADIOLOGY

## 2021-10-21 PROCEDURE — 99285 EMERGENCY DEPT VISIT HI MDM: CPT | Mod: 25 | Performed by: EMERGENCY MEDICINE

## 2021-10-21 RX ORDER — DILTIAZEM HYDROCHLORIDE 30 MG/1
30 TABLET, FILM COATED ORAL 3 TIMES DAILY
Qty: 60 TABLET | Refills: 0 | Status: SHIPPED | OUTPATIENT
Start: 2021-10-21 | End: 2021-10-22

## 2021-10-21 ASSESSMENT — ENCOUNTER SYMPTOMS
CHILLS: 0
SHORTNESS OF BREATH: 0
BLOOD IN STOOL: 0
FEVER: 0
PALPITATIONS: 1

## 2021-10-21 NOTE — ED TRIAGE NOTES
Irregular heartbeat, thinking it's been ongoing for past 24 hours constantly, initially started intermittently on Saturday. Has been getting progressively worse. Has had this in past, has never been worked up for it as it typically goes away after about 30 minutes. Then won't feel anything for several months. This is first time has felt it daily for multiple days in a row. Has been very dizzy in the mornings and when lying down.

## 2021-10-21 NOTE — DISCHARGE INSTRUCTIONS
Fill your scripts and start as soon as possible.    Cardiology will call you later today or tomorrow to see you in their clinic sometime tomorrow to get an echocardiogram performed and to talk about elective cardioversion.  If they do not call you please call them at [facility]iology Clinic, Dr. Chavarria (phone: [phone]).    Return to the ER for worsening symptoms or lightheadedness.

## 2021-10-21 NOTE — TELEPHONE ENCOUNTER
Hx of afib. Last 5 days has been happening.      18 hours of pains in chest.  Patient asking for EKG    Care advice given. Caller verbalizes understanding    TO ER.    Albertina Smith RN  [facility] Nurse Advisor    Reason for Disposition    Heart beating irregularly or very rapidly    Additional Information    Negative: Severe difficulty breathing (e.g., struggling for each breath, speaks in single words)    Negative: Passed out (i.e., fainted, collapsed and was not responding)    Negative: Difficult to awaken or acting confused (e.g., disoriented, slurred speech)    Negative: Shock suspected (e.g., cold/pale/clammy skin, too weak to stand, low BP, rapid pulse)    Negative: Chest pain lasting longer than 5 minutes and ANY of the following:* Over 45 years old* Over 30 years old and at least one cardiac risk factor (e.g., diabetes, high blood pressure, high cholesterol, smoker, or strong family history of heart disease)* History of heart disease (i.e., angina, heart attack, heart failure, bypass surgery, takes nitroglycerin)* Pain is crushing, pressure-like, or heavy    Negative: Heart beating < 50 beats per minute OR > 140 beats per minute    Negative: Visible sweat on face or sweat dripping down face    Negative: Sounds like a life-threatening emergency to the triager    Negative: Followed an injury to chest    Negative: SEVERE chest pain    Negative: Pain also in shoulder(s) or arm(s) or jaw    Negative: Difficulty breathing    Negative: Cocaine use within last 3 days    Negative: Major surgery in the past month    Negative: Hip or leg fracture (broken bone) in past month (or had cast on leg or ankle in past month)    Negative: Illness requiring prolonged bedrest in past month (e.g., immobilization, long hospital stay)    Negative: Long-distance travel in past month (e.g., car, bus, train, plane; with trip lasting 6 or more hours)    Negative: History of prior 'blood clot' in leg or lungs (i.e., deep  vein thrombosis, pulmonary embolism)    Negative: History of inherited increased risk of blood clots (e.g., Factor 5 Leiden, Anti-thrombin 3, Protein C or Protein S deficiency, Prothrombin mutation)    Protocols used: CHEST PAIN-A-OH

## 2021-10-21 NOTE — ED PROVIDER NOTES
[facility] EMERGENCY DEPARTMENT ([facility])    10/21/21     ED 6    History     Chief Complaint   Patient presents with     Palpitations     The history is provided by the patient and medical records.     Angie Lopes is a 46 year old male who presents to the Emergency Department  with complaints of palpitations.  Patient states that he has had A. fib intermittently for quite some time and has not seen a cardiologist.  Patient states he is uncertain why he goes into A. fib but does have a family history of A. fib.  Patient denies any significant drug use or alcoholism.  Patient appears to be in excellent health but denies any special  diets.  The patient states that he normally goes into his palpitations for 30 minutes and it resolves.  This time the patient states that his palpitations started on Saturday and is uncertain whether or not they resolved.  Patient denies any shortness of breath but states that he was getting dizzy when he stood up.  He denies any chest pain.  Patient denies any fevers or coughing.  He denies any melena or bright blood per rectum.  Patient states that Monday he had another exacerbation of his fast palpitations and thinks that he has been in A. fib now for at least 3 days if not 5 days.  Patient has never had an echo or an angiogram and presents here to the ER for evaluation.    I have reviewed the Medications, Allergies, Past Medical and Surgical History, and Social History in the Epic system.    History reviewed. No pertinent past medical history.    Past Surgical History:   Procedure Laterality Date     VASECTOMY  2004         Dose / Directions   sildenafil 20 MG tablet  Commonly known as: REVATIO  Used for: Erectile dysfunction, unspecified erectile dysfunction type      Take 1-5 tablets 30-60 minutes prior to activity. Do not take with nitroglycerin, doxazosin, or terazosin.  Quantity: 20 tablet  Refills: 0            Past medical history, past surgical  history, medications, and allergies were reviewed with the patient. Additional pertinent items: None    Family History   Problem Relation Age of Onset     Lipids Mother      Hypertension Mother      Atrial fibrillation Father      Cancer - colorectal Maternal Grandmother        Social History     Tobacco Use     Smoking status: Never Smoker     Smokeless tobacco: Never Used   Substance Use Topics     Alcohol use: Yes     Comment: occ. wine 3-4 times a week      Social history was reviewed with the patient. Additional pertinent items: None    No Known Allergies    Review of Systems   Constitutional: Negative for chills and fever.   Respiratory: Negative for shortness of breath.    Cardiovascular: Positive for palpitations. Negative for chest pain.   Gastrointestinal: Negative for blood in stool.     A complete review of systems was performed with pertinent positives and negatives noted in the HPI, and all other systems negative.    Physical Exam   BP: 119/70  Pulse: 82  Temp: (!) 95.7  F (35.4  C)  Resp: 16  SpO2: 100 %      Physical Exam  Vitals and nursing note reviewed.   Constitutional:       General: He is not in acute distress.     Appearance: He is not ill-appearing or diaphoretic.      Comments: Appears to be in excellent physical condition   HENT:      Head: Atraumatic.   Eyes:      Extraocular Movements: Extraocular movements intact.      Pupils: Pupils are equal, round, and reactive to light.   Cardiovascular:      Rate and Rhythm: Rhythm irregular.   Pulmonary:      Breath sounds: Normal breath sounds. No wheezing or rales.   Musculoskeletal:      Cervical back: Neck supple.   Skin:     General: Skin is warm.   Neurological:      General: No focal deficit present.      Mental Status: He is alert and oriented to person, place, and time.   Psychiatric:         Mood and Affect: Mood normal.         ED Course        Procedures         Patient was placed on cardiac monitor and oximetry.    EKG revealed atrial  fibrillation with an initial rapid ventricular response to an average rate of 108.  There was a normal axis with no acute ST or T wave changes significant for ischemia.  Is read by me personally.    Chest x-ray revealed no acute cardiopulmonary disease.         Results for orders placed or performed during the hospital encounter of 10/21/21 (from the past 24 hour(s))   CBC with platelets differential *Canceled*    Narrative    The following orders were created for panel order CBC with platelets differential.  Procedure                               Abnormality         Status                     ---------                               -----------         ------                     CBC with platelets and d...[[id number]]                                                   Please view results for these tests on the individual orders.   Troponin I   Result Value Ref Range    Troponin I <0.015 0.000 - 0.045 ug/L   INR   Result Value Ref Range    INR 1.02 0.86 - 1.14   Comprehensive metabolic panel   Result Value Ref Range    Sodium 140 133 - 144 mmol/L    Potassium 4.1 3.4 - 5.3 mmol/L    Chloride 108 94 - 109 mmol/L    Carbon Dioxide (CO2) 28 20 - 32 mmol/L    Anion Gap 4 3 - 14 mmol/L    Urea Nitrogen 15 7 - 30 mg/dL    Creatinine 1.29 (H) 0.66 - 1.25 mg/dL    Calcium 9.2 8.5 - 10.1 mg/dL    Glucose 98 70 - 99 mg/dL    Alkaline Phosphatase 62 40 - 150 U/L    AST 17 0 - 45 U/L    ALT 33 0 - 70 U/L    Protein Total 6.9 6.8 - 8.8 g/dL    Albumin 3.8 3.4 - 5.0 g/dL    Bilirubin Total 0.8 0.2 - 1.3 mg/dL    GFR Estimate 66 >60 mL/min/1.73m2   Magnesium   Result Value Ref Range    Magnesium 2.2 1.6 - 2.3 mg/dL   Nt probnp inpatient (BNP)   Result Value Ref Range    N terminal Pro BNP Inpatient 705 (H) 0 - 450 pg/mL   TSH with free T4 reflex   Result Value Ref Range    TSH 1.08 0.40 - 4.00 mU/L   Extra Tube ([place] Draw)    Narrative    The following orders were created for panel order Extra Tube ([place] Draw).  Procedure                                Abnormality         Status                     ---------                               -----------         ------                     Extra Red Top Tube[[id number]]                               Final result                 Please view results for these tests on the individual orders.   Extra Red Top Tube   Result Value Ref Range    Hold Specimen JIC    CBC with platelets differential    Narrative    The following orders were created for panel order CBC with platelets differential.  Procedure                               Abnormality         Status                     ---------                               -----------         ------                     CBC with platelets and d...[[id number]]  Abnormal            Final result                 Please view results for these tests on the individual orders.   CBC with platelets and differential   Result Value Ref Range    WBC Count 4.2 4.0 - 11.0 10e3/uL    RBC Count 4.31 (L) 4.40 - 5.90 10e6/uL    Hemoglobin 11.1 (L) 13.3 - 17.7 g/dL    Hematocrit 35.0 (L) 40.0 - 53.0 %    MCV 81 78 - 100 fL    MCH 25.8 (L) 26.5 - 33.0 pg    MCHC 31.7 31.5 - 36.5 g/dL    RDW 14.0 10.0 - 15.0 %    Platelet Count 188 150 - 450 10e3/uL    % Neutrophils 52 %    % Lymphocytes 36 %    % Monocytes 10 %    % Eosinophils 1 %    % Basophils 1 %    % Immature Granulocytes 0 %    NRBCs per 100 WBC 0 <1 /100    Absolute Neutrophils 2.2 1.6 - 8.3 10e3/uL    Absolute Lymphocytes 1.5 0.8 - 5.3 10e3/uL    Absolute Monocytes 0.4 0.0 - 1.3 10e3/uL    Absolute Eosinophils 0.0 0.0 - 0.7 10e3/uL    Absolute Basophils 0.0 0.0 - 0.2 10e3/uL    Absolute Immature Granulocytes 0.0 <=0.0 10e3/uL    Absolute NRBCs 0.0 10e3/uL   XR Chest Port 1 View    Impression    RESIDENT PRELIMINARY INTERPRETATION  IMPRESSION: No acute cardiopulmonary abnormality.           Assessments & Plan (with Medical Decision Making)     I have reviewed the nursing notes.    Patient presents to the ER with  intermittent episodes of paroxysmal atrial fibrillation at home now with a persistent 1 for the for the last 5 days.  Patient is not anticoagulated and the case was discussed with cardiology.  At this time the patient is stable with good rate control and will be placed on the medications below and follow-up with cardiology tomorrow for echocardiogram and to get scheduled for cardioversion should he remain in A. fib.  Patient understands the plan and agrees.    I have reviewed the findings, diagnosis, plan and need for follow up with the patient.    New Prescriptions    APIXABAN ANTICOAGULANT (ELIQUIS) 5 MG TABLET    Take 1 tablet (5 mg) by mouth 2 times daily for 20 days    DILTIAZEM (CARDIZEM) 30 MG TABLET    Take 1 tablet (30 mg) by mouth 3 times daily for 20 days       Final diagnoses:   Paroxysmal atrial fibrillation (H) - persistent     Fill your scripts and start as soon as possible.    Cardiology will call you later today or tomorrow to see you in their clinic sometime tomorrow to get an echocardiogram performed and to talk about elective cardioversion.  If they do not call you please call them at Cardiology Clinic, Dr. Chavarria (phone: [phone]).    Return to the ER for worsening symptoms or lightheadedness.    Jason Kemp MD, MD    10/21/2021   [facility] EMERGENCY DEPARTMENT     Jason Kemp MD  10/21/21 [id number]

## 2021-10-22 ENCOUNTER — VIRTUAL VISIT (OUTPATIENT)
Dept: CARDIOLOGY | Facility: CLINIC | Age: 46
End: 2021-10-22
Attending: INTERNAL MEDICINE
Payer: COMMERCIAL

## 2021-10-22 ENCOUNTER — HOSPITAL ENCOUNTER (OUTPATIENT)
Facility: CLINIC | Age: 46
End: 2021-10-22
Payer: COMMERCIAL

## 2021-10-22 ENCOUNTER — TELEPHONE (OUTPATIENT)
Dept: FAMILY MEDICINE | Facility: CLINIC | Age: 46
End: 2021-10-22

## 2021-10-22 ENCOUNTER — LAB (OUTPATIENT)
Dept: LAB | Facility: CLINIC | Age: 46
End: 2021-10-22
Attending: NURSE PRACTITIONER
Payer: COMMERCIAL

## 2021-10-22 DIAGNOSIS — I48.0 PAF (PAROXYSMAL ATRIAL FIBRILLATION) (H): ICD-10-CM

## 2021-10-22 DIAGNOSIS — Z11.59 ENCOUNTER FOR SCREENING FOR OTHER VIRAL DISEASES: Primary | ICD-10-CM

## 2021-10-22 DIAGNOSIS — I48.0 PAF (PAROXYSMAL ATRIAL FIBRILLATION) (H): Primary | ICD-10-CM

## 2021-10-22 LAB
ATRIAL RATE - MUSE: 77 BPM
DIASTOLIC BLOOD PRESSURE - MUSE: NORMAL MMHG
INTERPRETATION ECG - MUSE: NORMAL
LVEF ECHO: NORMAL
P AXIS - MUSE: NORMAL DEGREES
PR INTERVAL - MUSE: NORMAL MS
QRS DURATION - MUSE: 90 MS
QT - MUSE: 336 MS
QTC - MUSE: 450 MS
R AXIS - MUSE: 26 DEGREES
SYSTOLIC BLOOD PRESSURE - MUSE: NORMAL MMHG
T AXIS - MUSE: 37 DEGREES
VENTRICULAR RATE- MUSE: 108 BPM

## 2021-10-22 PROCEDURE — 93306 TTE W/DOPPLER COMPLETE: CPT

## 2021-10-22 PROCEDURE — U0005 INFEC AGEN DETEC AMPLI PROBE: HCPCS | Mod: 90 | Performed by: PATHOLOGY

## 2021-10-22 PROCEDURE — 93306 TTE W/DOPPLER COMPLETE: CPT | Mod: 26 | Performed by: INTERNAL MEDICINE

## 2021-10-22 PROCEDURE — U0003 INFECTIOUS AGENT DETECTION BY NUCLEIC ACID (DNA OR RNA); SEVERE ACUTE RESPIRATORY SYNDROME CORONAVIRUS 2 (SARS-COV-2) (CORONAVIRUS DISEASE [COVID-19]), AMPLIFIED PROBE TECHNIQUE, MAKING USE OF HIGH THROUGHPUT TECHNOLOGIES AS DESCRIBED BY CMS-2020-01-R: HCPCS | Mod: 90 | Performed by: PATHOLOGY

## 2021-10-22 PROCEDURE — 99205 OFFICE O/P NEW HI 60 MIN: CPT | Mod: 25 | Performed by: INTERNAL MEDICINE

## 2021-10-22 RX ORDER — POTASSIUM CHLORIDE 1500 MG/1
20 TABLET, EXTENDED RELEASE ORAL
Status: CANCELLED | OUTPATIENT
Start: 2021-10-22

## 2021-10-22 RX ORDER — POTASSIUM CHLORIDE 1500 MG/1
40 TABLET, EXTENDED RELEASE ORAL
Status: CANCELLED | OUTPATIENT
Start: 2021-10-22

## 2021-10-22 RX ORDER — LIDOCAINE 40 MG/G
CREAM TOPICAL
Status: CANCELLED | OUTPATIENT
Start: 2021-10-22

## 2021-10-22 RX ORDER — MAGNESIUM SULFATE HEPTAHYDRATE 40 MG/ML
2 INJECTION, SOLUTION INTRAVENOUS
Status: CANCELLED | OUTPATIENT
Start: 2021-10-22

## 2021-10-22 NOTE — LETTER
"10/22/2021      RE: Angie Lopes  [address]  [place] [facility] [facility] [address]       Dear Colleague,    Thank you for the opportunity to participate in the care of your patient, Angie Lopes, at the [facility] HEART CLINIC [facility]. Please see a copy of my visit note below.        ELECTROPHYSIOLOGY VIRTUAL CLINIC VISIT  Angie Lopes is a 46 year old male who is being evaluated via a billable video visit.      The patient has been notified of following:     \"This video visit will be conducted via a call between you and your physician/provider. We have found that certain health care needs can be provided without the need for an in-person physical exam.  This service lets us provide the care you need with a video conversation.  If a prescription is necessary we can send it directly to your pharmacy.  If lab work is needed we can place an order for that and you can then stop by our lab to have the test done at a later time.    If during the course of the call the physician/provider feels a video visit is not appropriate, you will not be charged for this service.\"     Physician/provider has received verbal consent for a Video Visit from the patient? Yes      Assessment/Recommendations   Assessment/Plan:    Mr. Lopes is a 46 year old male who has a past medical history significant for PAF and GIB s/p polypectomy. He reports having symptoms of palpitations for last 5 years. He states he would have intermittent palpitations lasting up to 30 minutes and then self resolving. Then in 8/2021, he started having increasing frequency of symptoms. He reports in 6/2021 he had GIB and had polyp removed. Of note, his Hgb is slowly downtredning again. He denies any melena or bright blood per rectum.He then presented to [facility] on 10/21/21 with a 5 day history of palpitation and orthostatic dizziness. He was found to be in AF. He was started on Eliquis and " Diltiazem and arranged for close EP clinic visit.  He reports feeling that he is out of AF now.  He reports feeling interrupted sleep, never had a sleep evaluation.  Echo today shows normal structure and function and does show he is now in SR. Current cardiac medications include: Eliquis and Diltiazem.     Paroxsymal Atrial Fibrillation:   We discussed in detail with the patient management/treatment options for Jatinder includin. Stroke Prophylaxis:  CHADSVASC=  0, corresponding to a 0.2-0.5% annual stroke / systemic emolism event rate. indicating NO need for long term oral anticoagulation.  He had been started on Eliquis yesterday in preporation for possible DCCV. Now that he has self converted, he can stop Eliquis now. He had a GIB this summer and Hgb has now been slightly downtrending. We have asked him to follow up/establish care with PCP about this for further work up within the next week.  2. Rate Control: He had been on diltiazem, but can stop now. Can consider PRN if further episodes.   3. Rhythm Control: Cardioversion, Antiarrhythmics and/or ablation are options for rhythm control. He was scheduled for DCCV; however, he self converted so we can cancel this. We discussed use of AAT and/or ablation. We discussed indications, risks, and benefits of each approach in detail. He wishes to defer further rhythm control for now, but will consider in future if needed. We would also like for him to complete his anemia evaluation before we consider ablation therapy. We would favor medical therapy until the work-up is complete.  4. Risk Factor Management: BP control, and will refer for MERA evaluation.        Follow up in 3 months.        History of Present Illness/Subjective    Mr. Angie Lopes is a 46 year old male who comes in today for EP consultation of PAF.    Mr. Lopes is a 46 year old male who has a past medical history significant for PAF and GIB s/p polypectomy.     He reports having symptoms of  palpitations for last 5 years. He states he would have intermittent palpitations lasting up to 30 minutes and then self resolving. Then in 8/2021, he started having increasing frequency of symptoms. He reports in 6/2021 he had GIB and had polyp removed. Of note, his Hgb is slowly downtredning again. He then presented to [facility] on 10/21/21 with a 5 day history of palpitation and orthostatic dizziness. He was found to be in AF. He was started on Eliquis and Diltiazem and arranged for close EP clinic visit.     He presents today to establish care.  Patient denies any significant drug use or alcoholism.  Patient appears to be in excellent health but denies any special  diets. He reports feeling that he is out of AF now.  He reports feeling interrupted sleep, never had a sleep evaluation. He denies any melena or bright blood per rectum. He denies chest discomfort, palpitations, abdominal fullness/bloating or peripheral edema, shortness of breath, paroxysmal nocturnal dyspnea, orthopnea, lightheadedness, dizziness, pre-syncope, or syncope. Echo today shows normal structure and function and does show he is now in SR. Current cardiac medications include: Eliquis and Diltiazem.     I have reviewed and updated the patient's Past Medical History, Social History, Family History and Medication List.     Cardiographics (Personally Reviewed) :   10/22/21 Echo:   Interpretation Summary  Global and regional left ventricular function is normal with an EF of 60-65%.  Right ventricular function, chamber size, wall motion, and thickness are normal.  The inferior vena cava is normal.  No pericardial effusion is present.  There is no prior study for direct comparison.         Physical Examination   There were no vitals taken for this visit.  Wt Readings from Last 3 Encounters:   06/02/21 96.2 kg (212 lb)   02/03/20 (P) 94 kg (207 lb 4 oz)   08/16/17 92.6 kg (204 lb 4 oz)     The rest of a comprehensive physical examination is  deferred due to public health emergency video visit restrictions.  CONSITUTIONAL: no acute distress  HEENT: no icterus, no redness or discharge, neck supple  CV: no visible edema of visualized extremities. No JVD.   RESPIRATORY: respirations nonlabored, no cough  NEURO: AA&Ox3, speech fluent/appropriate, motor grossly nonfocal  PSYCH: cooperative, affect appropriate  DERM: no rashes on visualized face/neck/upper extremities         Medications  Allergies   Current Outpatient Medications   Medication Sig Dispense Refill     apixaban ANTICOAGULANT (ELIQUIS) 5 MG tablet Take 1 tablet (5 mg) by mouth 2 times daily for 20 days 40 tablet 0     diltiazem (CARDIZEM) 30 MG tablet Take 1 tablet (30 mg) by mouth 3 times daily for 20 days 60 tablet 0     NO ACTIVE MEDICATIONS .       sildenafil (REVATIO) 20 MG tablet Take 1-5 tablets 30-60 minutes prior to activity. Do not take with nitroglycerin, doxazosin, or terazosin. 20 tablet 0    No Known Allergies      Lab Results (Personally Reviewed)    Chemistry/lipid CBC Cardiac Enzymes/BNP/TSH/INR   Lab Results   Component Value Date    BUN 15 10/21/2021     10/21/2021    CO2 28 10/21/2021     Creatinine   Date Value Ref Range Status   10/21/2021 1.29 (H) 0.66 - 1.25 mg/dL Final   06/11/2021 1.18 0.66 - 1.25 mg/dL Final       Lab Results   Component Value Date    CHOL 172 06/11/2021    HDL 66 06/11/2021    LDL 97 06/11/2021      Lab Results   Component Value Date    WBC 4.2 10/21/2021    HGB 11.1 (L) 10/21/2021    HCT 35.0 (L) 10/21/2021    MCV 81 10/21/2021     10/21/2021    Lab Results   Component Value Date    TSH 1.08 10/21/2021    INR 1.02 10/21/2021          Video-Visit Details    Type of service:  Video Visit    Video Visit Duration: 50 minutes.     Originating Location (pt. Location): Home    Distant Location (provider location):  [facility]     Mode of Communication:  Video Conference via [employer]    I have reviewed the note as  documented above.  This accurately captures the substance of my virtual visit with the patient. The patient states understanding and is agreeable with the plan.     Amanuel Chavarria MD [facility]  Cardiology - Electrophysiology

## 2021-10-22 NOTE — PROGRESS NOTES
"Angie is a 46 year old who is being evaluated via a billable video visit.      How would you like to obtain your AVS? MyChart  If the video visit is dropped, the invitation should be resent by: Text to cell phone: [phone]  Will anyone else be joining your video visit? No    Platform used for Video Visit: [employer] - Patient Reported  Pain Score: No Pain (0) (Pt reports \"going out of afib this morning\". Pt c/o dizziness and orthopenea. No SOB)    Medications reconciled.     Edwar Grimm CMA  4:10 PM                "

## 2021-10-22 NOTE — PROGRESS NOTES
"    ELECTROPHYSIOLOGY VIRTUAL CLINIC VISIT  Angie Lopes is a 46 year old male who is being evaluated via a billable video visit.      The patient has been notified of following:     \"This video visit will be conducted via a call between you and your physician/provider. We have found that certain health care needs can be provided without the need for an in-person physical exam.  This service lets us provide the care you need with a video conversation.  If a prescription is necessary we can send it directly to your pharmacy.  If lab work is needed we can place an order for that and you can then stop by our lab to have the test done at a later time.    If during the course of the call the physician/provider feels a video visit is not appropriate, you will not be charged for this service.\"     Physician/provider has received verbal consent for a Video Visit from the patient? Yes      Assessment/Recommendations   Assessment/Plan:    Mr. Lopes is a 46 year old male who has a past medical history significant for PAF and GIB s/p polypectomy. He reports having symptoms of palpitations for last 5 years. He states he would have intermittent palpitations lasting up to 30 minutes and then self resolving. Then in 8/2021, he started having increasing frequency of symptoms. He reports in 6/2021 he had GIB and had polyp removed. Of note, his Hgb is slowly downtredning again. He denies any melena or bright blood per rectum.He then presented to [facility] on 10/21/21 with a 5 day history of palpitation and orthostatic dizziness. He was found to be in AF. He was started on Eliquis and Diltiazem and arranged for close EP clinic visit.  He reports feeling that he is out of AF now.  He reports feeling interrupted sleep, never had a sleep evaluation.  Echo today shows normal structure and function and does show he is now in SR. Current cardiac medications include: Eliquis and Diltiazem.     Paroxsymal Atrial Fibrillation:   We discussed " in detail with the patient management/treatment options for Jatinder includin. Stroke Prophylaxis:  CHADSVASC=  0, corresponding to a 0.2-0.5% annual stroke / systemic emolism event rate. indicating NO need for long term oral anticoagulation.  He had been started on Eliquis yesterday in preporation for possible DCCV. Now that he has self converted, he can stop Eliquis now. He had a GIB this summer and Hgb has now been slightly downtrending. We have asked him to follow up/establish care with PCP about this for further work up within the next week.  2. Rate Control: He had been on diltiazem, but can stop now. Can consider PRN if further episodes.   3. Rhythm Control: Cardioversion, Antiarrhythmics and/or ablation are options for rhythm control. He was scheduled for DCCV; however, he self converted so we can cancel this. We discussed use of AAT and/or ablation. We discussed indications, risks, and benefits of each approach in detail. He wishes to defer further rhythm control for now, but will consider in future if needed. We would also like for him to complete his anemia evaluation before we consider ablation therapy. We would favor medical therapy until the work-up is complete.  4. Risk Factor Management: BP control, and will refer for MERA evaluation.        Follow up in 3 months.        History of Present Illness/Subjective    Mr. Angie Lopes is a 46 year old male who comes in today for EP consultation of PAF.    Mr. Lopes is a 46 year old male who has a past medical history significant for PAF and GIB s/p polypectomy.     He reports having symptoms of palpitations for last 5 years. He states he would have intermittent palpitations lasting up to 30 minutes and then self resolving. Then in 2021, he started having increasing frequency of symptoms. He reports in 2021 he had GIB and had polyp removed. Of note, his Hgb is slowly downtredning again. He then presented to [facility] on 10/21/21 with a 5 day history of  palpitation and orthostatic dizziness. He was found to be in AF. He was started on Eliquis and Diltiazem and arranged for close EP clinic visit.     He presents today to establish care.  Patient denies any significant drug use or alcoholism.  Patient appears to be in excellent health but denies any special  diets. He reports feeling that he is out of AF now.  He reports feeling interrupted sleep, never had a sleep evaluation. He denies any melena or bright blood per rectum. He denies chest discomfort, palpitations, abdominal fullness/bloating or peripheral edema, shortness of breath, paroxysmal nocturnal dyspnea, orthopnea, lightheadedness, dizziness, pre-syncope, or syncope. Echo today shows normal structure and function and does show he is now in SR. Current cardiac medications include: Eliquis and Diltiazem.     I have reviewed and updated the patient's Past Medical History, Social History, Family History and Medication List.     Cardiographics (Personally Reviewed) :   10/22/21 Echo:   Interpretation Summary  Global and regional left ventricular function is normal with an EF of 60-65%.  Right ventricular function, chamber size, wall motion, and thickness are normal.  The inferior vena cava is normal.  No pericardial effusion is present.  There is no prior study for direct comparison.         Physical Examination   There were no vitals taken for this visit.  Wt Readings from Last 3 Encounters:   06/02/21 96.2 kg (212 lb)   02/03/20 (P) 94 kg (207 lb 4 oz)   08/16/17 92.6 kg (204 lb 4 oz)     The rest of a comprehensive physical examination is deferred due to public health emergency video visit restrictions.  CONSITUTIONAL: no acute distress  HEENT: no icterus, no redness or discharge, neck supple  CV: no visible edema of visualized extremities. No JVD.   RESPIRATORY: respirations nonlabored, no cough  NEURO: AA&Ox3, speech fluent/appropriate, motor grossly nonfocal  PSYCH: cooperative, affect  appropriate  DERM: no rashes on visualized face/neck/upper extremities         Medications  Allergies   Current Outpatient Medications   Medication Sig Dispense Refill     apixaban ANTICOAGULANT (ELIQUIS) 5 MG tablet Take 1 tablet (5 mg) by mouth 2 times daily for 20 days 40 tablet 0     diltiazem (CARDIZEM) 30 MG tablet Take 1 tablet (30 mg) by mouth 3 times daily for 20 days 60 tablet 0     NO ACTIVE MEDICATIONS .       sildenafil (REVATIO) 20 MG tablet Take 1-5 tablets 30-60 minutes prior to activity. Do not take with nitroglycerin, doxazosin, or terazosin. 20 tablet 0    No Known Allergies      Lab Results (Personally Reviewed)    Chemistry/lipid CBC Cardiac Enzymes/BNP/TSH/INR   Lab Results   Component Value Date    BUN 15 10/21/2021     10/21/2021    CO2 28 10/21/2021     Creatinine   Date Value Ref Range Status   10/21/2021 1.29 (H) 0.66 - 1.25 mg/dL Final   06/11/2021 1.18 0.66 - 1.25 mg/dL Final       Lab Results   Component Value Date    CHOL 172 06/11/2021    HDL 66 06/11/2021    LDL 97 06/11/2021      Lab Results   Component Value Date    WBC 4.2 10/21/2021    HGB 11.1 (L) 10/21/2021    HCT 35.0 (L) 10/21/2021    MCV 81 10/21/2021     10/21/2021    Lab Results   Component Value Date    TSH 1.08 10/21/2021    INR 1.02 10/21/2021          Video-Visit Details    Type of service:  Video Visit    Video Visit Duration: 50 minutes.     Originating Location (pt. Location): Home    Distant Location (provider location):  [facility]     Mode of Communication:  Video Conference via [employer]    I have reviewed the note as documented above.  This accurately captures the substance of my virtual visit with the patient. The patient states understanding and is agreeable with the plan.     Amanuel Chavarria MD [facility]  Cardiology - Electrophysiology

## 2021-10-22 NOTE — PATIENT INSTRUCTIONS
Plan:   - Cancel cardioverison  - Stop Eliquis  - Stop diltiazem  - See a primary care provider to evaluate your hemoglobin.   - Sleep medicine referral  - Follow-up 3 months  - Decrease coffee and alcohol consumption    Your Care Team:  EP Cardiology   Telephone Number     Flora Hamilton RN [phone]     For scheduling appts or procedures:    Macy Espinoza   [phone]   For the Device Clinic (Pacemakers, ICDs, Loop Recorders)    During business hours: [phone]  After business hours:   [phone]- select option 4 and ask for job code [id number].       Cardiovascular Clinic:   [address] [place] [address]. [place] [address]      As always, Thank you for trusting us with your health care needs!

## 2021-10-22 NOTE — TELEPHONE ENCOUNTER
ED / Discharge Outreach Protocol    Patient Contact    Attempt # 1    Was call answered?  No.  Left message on voicemail with information to call me back.    It looks like he is at Cardiology visit now.    Daina Palacios RN

## 2021-10-23 LAB — SARS-COV-2 RNA RESP QL NAA+PROBE: NEGATIVE

## 2021-10-25 ENCOUNTER — ANCILLARY PROCEDURE (OUTPATIENT)
Dept: ULTRASOUND IMAGING | Facility: CLINIC | Age: 46
End: 2021-10-25
Attending: PHYSICIAN ASSISTANT
Payer: COMMERCIAL

## 2021-10-25 DIAGNOSIS — E07.9 ASYMMETRICAL THYROID: ICD-10-CM

## 2021-10-25 PROCEDURE — 76536 US EXAM OF HEAD AND NECK: CPT | Mod: GC | Performed by: RADIOLOGY

## 2021-11-15 ENCOUNTER — OFFICE VISIT (OUTPATIENT)
Dept: FAMILY MEDICINE | Facility: CLINIC | Age: 46
End: 2021-11-15
Payer: COMMERCIAL

## 2021-11-15 ENCOUNTER — TELEPHONE (OUTPATIENT)
Dept: CARDIOLOGY | Facility: CLINIC | Age: 46
End: 2021-11-15

## 2021-11-15 VITALS
BODY MASS INDEX: 28.22 KG/M2 | DIASTOLIC BLOOD PRESSURE: 82 MMHG | TEMPERATURE: 97.2 F | SYSTOLIC BLOOD PRESSURE: 120 MMHG | WEIGHT: 211 LBS | OXYGEN SATURATION: 100 % | HEART RATE: 51 BPM

## 2021-11-15 DIAGNOSIS — I48.0 PAF (PAROXYSMAL ATRIAL FIBRILLATION) (H): Primary | ICD-10-CM

## 2021-11-15 LAB
BASOPHILS # BLD AUTO: 0 10E3/UL (ref 0–0.2)
BASOPHILS NFR BLD AUTO: 1 %
EOSINOPHIL # BLD AUTO: 0.1 10E3/UL (ref 0–0.7)
EOSINOPHIL NFR BLD AUTO: 1 %
ERYTHROCYTE [DISTWIDTH] IN BLOOD BY AUTOMATED COUNT: 14.2 % (ref 10–15)
HCT VFR BLD AUTO: 48.8 % (ref 40–53)
HGB BLD-MCNC: 15.3 G/DL (ref 13.3–17.7)
IMM GRANULOCYTES # BLD: 0 10E3/UL
IMM GRANULOCYTES NFR BLD: 0 %
LYMPHOCYTES # BLD AUTO: 2.6 10E3/UL (ref 0.8–5.3)
LYMPHOCYTES NFR BLD AUTO: 38 %
MCH RBC QN AUTO: 25.1 PG (ref 26.5–33)
MCHC RBC AUTO-ENTMCNC: 31.4 G/DL (ref 31.5–36.5)
MCV RBC AUTO: 80 FL (ref 78–100)
MONOCYTES # BLD AUTO: 0.6 10E3/UL (ref 0–1.3)
MONOCYTES NFR BLD AUTO: 9 %
NEUTROPHILS # BLD AUTO: 3.6 10E3/UL (ref 1.6–8.3)
NEUTROPHILS NFR BLD AUTO: 52 %
PLATELET # BLD AUTO: 263 10E3/UL (ref 150–450)
RBC # BLD AUTO: 6.09 10E6/UL (ref 4.4–5.9)
WBC # BLD AUTO: 7 10E3/UL (ref 4–11)

## 2021-11-15 PROCEDURE — 99214 OFFICE O/P EST MOD 30 MIN: CPT | Performed by: PHYSICIAN ASSISTANT

## 2021-11-15 PROCEDURE — 80050 GENERAL HEALTH PANEL: CPT | Performed by: PHYSICIAN ASSISTANT

## 2021-11-15 PROCEDURE — 83550 IRON BINDING TEST: CPT | Performed by: PHYSICIAN ASSISTANT

## 2021-11-15 PROCEDURE — 93000 ELECTROCARDIOGRAM COMPLETE: CPT | Performed by: PHYSICIAN ASSISTANT

## 2021-11-15 PROCEDURE — 82728 ASSAY OF FERRITIN: CPT | Performed by: PHYSICIAN ASSISTANT

## 2021-11-15 PROCEDURE — 36415 COLL VENOUS BLD VENIPUNCTURE: CPT | Performed by: PHYSICIAN ASSISTANT

## 2021-11-15 RX ORDER — APIXABAN 5 MG (74)
KIT ORAL
Status: CANCELLED | OUTPATIENT
Start: 2021-11-15 | End: 2021-12-15

## 2021-11-15 NOTE — TELEPHONE ENCOUNTER
"11/15/21  Called and spoke to patient. He feels as though he's been in AF since Friday 11/12/21.   His HRs range from 70-100s.     Symptoms:  - feels heart rate irregularity  - \"fatigue in heart\"   - Anxiety re being in AF  - Denies SOB, [place], pre-syncopal symptoms    His smartwatch shows HRs, not a rhythm strip, but shows irregularity.   He has been taking diltiazem 30mg TID since onset (50 tabs left). Not on AC, of note hgb is low.   Reports increase in episodes of SOB over last month, unsure if they correlated to AF episodes or not. Presumed AF episodes are less symptomatic since he went to ER 10/21/21.    He has a previously scheduled appointment with his PCP today. He will have an EKG at that time. Will confirm rhythm and call patient tomorrow with a plan.           11/16/21    Belen Magana, BRENT CNP  You 19 hours ago (3:32 PM)     LV    Hello,   Ok to wait for 24 hours if he feels stable to see if it self converts. If not, then would have to start anticoagulation and arrange a DCCV. Ok with him taking the Diltiazem 30 mg TID during episode.   Thanks,   LVW        Called and spoke to patient. He feels he is out of AF after his PCP placed him back on Eliquis yesterday in anticipation of any AF intervention (and a WNL Hgb). Told patient it likely wasn't the Eliquis that converted him out. He can stop this now, as he is a ChadsVasc 0. He can also stop the diltiazem TID, but can resume if has another AF episode. Discussed nature of AF to patient and when to call/send MyChart to the clinic.  He will follow-up in 3 months with a janes prior (reports being told he also have PVCs).       "

## 2021-11-15 NOTE — PATIENT INSTRUCTIONS
Restart Eliquis 5 mg twice daily   Continue taking Diltiazem 30 mg three times daily   To the ED if new or worsening symptoms

## 2021-11-15 NOTE — TELEPHONE ENCOUNTER
[facility] Call Center    Phone Message    May a detailed message be left on voicemail: yes     Reason for Call: Symptoms or Concerns     If patient has red-flag symptoms, warm transfer to triage line    Current symptom or concern: A- FIB    Symptoms have been present for:  3 day(s)    Has patient previously been seen for this?no    By na  Date:   Angie stated that he thinks he has been in A-FIB since Friday 11/12/21 and that he was told by Dr. Chavarria to call when it happens.     Are there any new or worsening symptoms? No      Action Taken: Message routed to:  [facility] Cardio    Travel Screening: Not Applicable

## 2021-11-15 NOTE — PROGRESS NOTES
Assessment & Plan     (I48.0) PAF (paroxysmal atrial fibrillation) (H)  (primary encounter diagnosis)  Comment: Current episode going on past 36 hours. He is well appearing, no acute distress. Heart rhythm is irregular on exam. EKG showing atrial fibrillation. We had Angie and his wife stay in clinic while awaiting hemoglobin which has normalized. Will restart Eliquis. He denies any symptoms of upper or lower GIB. Continue Diltiazem for rate control. Await further recs from cardiology. To the ED if chest pain, shortness of breath, syncope, lightheadedness. They agree with this plan. Continue to work on staying well hydrated, healthy diet, stress reduction.   Plan: CBC with platelets and differential,         Comprehensive metabolic panel (BMP + Alb, Alk         Phos, ALT, AST, Total. Bili, TP), TSH with free        T4 reflex, Iron and iron binding capacity,         Ferritin     Return for cardiology.    Miguel A Clark PA-C  [facility]    Rich Adams is a 46 year old who presents for the following health issues  accompanied by his spouse.    HPI     Social:  Works in IT   Three children    - wife - Cindi - she works as pediatric NP    PMH/Medical Problems:  Paroxysmal atrial fibrillation - we last visited for routine physical on 6/2/21. At that time he noted intermittent palpitations that had resolved at the time of our visit. An order for a zio patch was placed but this was never acted on. On 10/21/21 Angie presented in the ED in atrial fibrillation. He was started on Eliquis and Diltiazem. He was planned to have cardioversion but self converted prior. He was taken off eliquis due to concern for low hemoglobin. More recently an episode of what he thinks is a fib started about 36 hours ago. Feels heart beating differently. The intensity is variable. Sometimes it is more noticeable. He tries to manage at home with walks, cold showers, valsalva maneuver. Stress has been higher.  Spouse thinks he does not do a good job lowering stress. No diet changes. No increase in alcohol, actually cut back after ED visit in October. His stools have been normal. No dark tarry stools or hematochezia. Wondering about restarting eliquis but concerned about hemoglobin. No regular NSAID use. Takes occasional ibuprofen for headaches.     Family history:  Father - living - recently diagnosed with a fib, hypertension, smoker   Mother - living - hyperlipidemia   Maternal grandmother - colon cancer      Review of Systems   Constitutional, HEENT, cardiovascular, pulmonary, gi and gu systems are negative, except as otherwise noted.      Objective    /82   Pulse 51   Temp 97.2  F (36.2  C) (Temporal)   Wt 95.7 kg (211 lb)   SpO2 100%   BMI 28.22 kg/m    Body mass index is 28.22 kg/m .  Physical Exam  Vitals and nursing note reviewed.   Constitutional:       General: He is not in acute distress.     Appearance: Normal appearance. He is not ill-appearing or diaphoretic.   HENT:      Head: Normocephalic and atraumatic.      Right Ear: Tympanic membrane, ear canal and external ear normal. There is no impacted cerumen.      Left Ear: Tympanic membrane, ear canal and external ear normal. There is no impacted cerumen.   Eyes:      Conjunctiva/sclera: Conjunctivae normal.      Pupils: Pupils are equal, round, and reactive to light.   Cardiovascular:      Rate and Rhythm: Normal rate. Rhythm irregular.   Pulmonary:      Effort: Pulmonary effort is normal.      Breath sounds: Normal breath sounds.   Abdominal:      General: Abdomen is flat. Bowel sounds are normal.      Palpations: Abdomen is soft.   Musculoskeletal:         General: No tenderness. Normal range of motion.      Cervical back: Neck supple. No tenderness.      Right lower leg: No edema.      Left lower leg: No edema.   Lymphadenopathy:      Cervical: No cervical adenopathy.   Skin:     General: Skin is warm and dry.   Neurological:      General: No  focal deficit present.      Mental Status: He is alert and oriented to person, place, and time. Mental status is at baseline.   Psychiatric:         Mood and Affect: Mood normal.         Behavior: Behavior normal.

## 2021-11-16 ENCOUNTER — TELEPHONE (OUTPATIENT)
Dept: CARDIOLOGY | Facility: CLINIC | Age: 46
End: 2021-11-16
Payer: COMMERCIAL

## 2021-11-16 LAB
ALBUMIN SERPL-MCNC: 4 G/DL (ref 3.4–5)
ALP SERPL-CCNC: 77 U/L (ref 40–150)
ALT SERPL W P-5'-P-CCNC: 33 U/L (ref 0–70)
ANION GAP SERPL CALCULATED.3IONS-SCNC: 6 MMOL/L (ref 3–14)
AST SERPL W P-5'-P-CCNC: 16 U/L (ref 0–45)
BILIRUB SERPL-MCNC: 0.6 MG/DL (ref 0.2–1.3)
BUN SERPL-MCNC: 17 MG/DL (ref 7–30)
CALCIUM SERPL-MCNC: 9.5 MG/DL (ref 8.5–10.1)
CHLORIDE BLD-SCNC: 106 MMOL/L (ref 94–109)
CO2 SERPL-SCNC: 26 MMOL/L (ref 20–32)
CREAT SERPL-MCNC: 1.24 MG/DL (ref 0.66–1.25)
FERRITIN SERPL-MCNC: 120 NG/ML (ref 26–388)
GFR SERPL CREATININE-BSD FRML MDRD: 69 ML/MIN/1.73M2
GLUCOSE BLD-MCNC: 88 MG/DL (ref 70–99)
IRON SATN MFR SERPL: 58 % (ref 15–46)
IRON SERPL-MCNC: 155 UG/DL (ref 35–180)
POTASSIUM BLD-SCNC: 4.1 MMOL/L (ref 3.4–5.3)
PROT SERPL-MCNC: 7.7 G/DL (ref 6.8–8.8)
SODIUM SERPL-SCNC: 138 MMOL/L (ref 133–144)
TIBC SERPL-MCNC: 268 UG/DL (ref 240–430)
TSH SERPL DL<=0.005 MIU/L-ACNC: 1.53 MU/L (ref 0.4–4)

## 2021-11-16 RX ORDER — DILTIAZEM HYDROCHLORIDE 30 MG/1
30 TABLET, FILM COATED ORAL 3 TIMES DAILY PRN
Start: 2021-11-16 | End: 2022-12-12

## 2021-11-16 NOTE — TELEPHONE ENCOUNTER
[facility] Call Center    Phone Message    May a detailed message be left on voicemail: yes     Reason for Call: Other: Pt would like to speak with Flora regarding the Afib he has been experiencing the last 4 days.  Please call him back to discuss next steps and if have to leave a voice mail please provide a direct number so he can contact Flora directly.       Action Taken: Message routed to:  [facility] Cardio    Travel Screening: Not Applicable

## 2021-12-16 ENCOUNTER — MYC MEDICAL ADVICE (OUTPATIENT)
Dept: CARDIOLOGY | Facility: CLINIC | Age: 46
End: 2021-12-16
Payer: COMMERCIAL

## 2021-12-20 ENCOUNTER — ALLIED HEALTH/NURSE VISIT (OUTPATIENT)
Dept: CARDIOLOGY | Facility: CLINIC | Age: 46
End: 2021-12-20
Attending: INTERNAL MEDICINE
Payer: COMMERCIAL

## 2021-12-20 DIAGNOSIS — I48.0 PAF (PAROXYSMAL ATRIAL FIBRILLATION) (H): ICD-10-CM

## 2021-12-20 PROCEDURE — 93248 EXT ECG>7D<15D REV&INTERPJ: CPT | Performed by: INTERNAL MEDICINE

## 2022-01-03 ENCOUNTER — VIRTUAL VISIT (OUTPATIENT)
Dept: SLEEP MEDICINE | Facility: CLINIC | Age: 47
End: 2022-01-03
Attending: INTERNAL MEDICINE
Payer: COMMERCIAL

## 2022-01-03 VITALS — BODY MASS INDEX: 25.61 KG/M2 | HEIGHT: 75 IN | WEIGHT: 206 LBS

## 2022-01-03 DIAGNOSIS — R06.00 PAROXYSMAL NOCTURNAL DYSPNEA: ICD-10-CM

## 2022-01-03 DIAGNOSIS — G47.8 NON-RESTORATIVE SLEEP: Primary | ICD-10-CM

## 2022-01-03 DIAGNOSIS — G47.59 OTHER PARASOMNIA: ICD-10-CM

## 2022-01-03 DIAGNOSIS — I48.0 PAF (PAROXYSMAL ATRIAL FIBRILLATION) (H): ICD-10-CM

## 2022-01-03 DIAGNOSIS — R06.83 SNORING: ICD-10-CM

## 2022-01-03 PROCEDURE — 99204 OFFICE O/P NEW MOD 45 MIN: CPT | Mod: GT | Performed by: INTERNAL MEDICINE

## 2022-01-03 ASSESSMENT — SLEEP AND FATIGUE QUESTIONNAIRES
HOW LIKELY ARE YOU TO NOD OFF OR FALL ASLEEP WHILE SITTING QUIETLY AFTER LUNCH WITHOUT ALCOHOL: WOULD NEVER DOZE
HOW LIKELY ARE YOU TO NOD OFF OR FALL ASLEEP WHILE WATCHING TV: WOULD NEVER DOZE
HOW LIKELY ARE YOU TO NOD OFF OR FALL ASLEEP IN A CAR, WHILE STOPPED FOR A FEW MINUTES IN TRAFFIC: WOULD NEVER DOZE
HOW LIKELY ARE YOU TO NOD OFF OR FALL ASLEEP WHEN YOU ARE A PASSENGER IN A CAR FOR AN HOUR WITHOUT A BREAK: WOULD NEVER DOZE
HOW LIKELY ARE YOU TO NOD OFF OR FALL ASLEEP WHILE LYING DOWN TO REST IN THE AFTERNOON WHEN CIRCUMSTANCES PERMIT: SLIGHT CHANCE OF DOZING
HOW LIKELY ARE YOU TO NOD OFF OR FALL ASLEEP WHILE SITTING AND TALKING TO SOMEONE: WOULD NEVER DOZE
HOW LIKELY ARE YOU TO NOD OFF OR FALL ASLEEP WHILE SITTING INACTIVE IN A PUBLIC PLACE: WOULD NEVER DOZE
HOW LIKELY ARE YOU TO NOD OFF OR FALL ASLEEP WHILE SITTING AND READING: WOULD NEVER DOZE

## 2022-01-03 ASSESSMENT — PAIN SCALES - GENERAL: PAINLEVEL: NO PAIN (0)

## 2022-01-03 ASSESSMENT — ENCOUNTER SYMPTOMS
SNORES LOUDLY: 0
TASTE DISTURBANCE: 0
SORE THROAT: 0
ORTHOPNEA: 1
HYPOTENSION: 0
WHEEZING: 0
SINUS CONGESTION: 1
HYPERTENSION: 0
HOARSE VOICE: 0
DYSPNEA ON EXERTION: 1
SMELL DISTURBANCE: 0
HEMOPTYSIS: 0
SYNCOPE: 0
POSTURAL DYSPNEA: 1
COUGH DISTURBING SLEEP: 0
TROUBLE SWALLOWING: 0
SHORTNESS OF BREATH: 1
SINUS PAIN: 0
SPUTUM PRODUCTION: 0
NECK MASS: 0
EXERCISE INTOLERANCE: 1
LIGHT-HEADEDNESS: 0
LEG PAIN: 0
SLEEP DISTURBANCES DUE TO BREATHING: 1
PALPITATIONS: 1
COUGH: 0

## 2022-01-03 ASSESSMENT — MIFFLIN-ST. JEOR: SCORE: 1900.04

## 2022-01-03 NOTE — PROGRESS NOTES
Angie is a 46 year old who is being evaluated via a billable video visit.      How would you like to obtain your AVS? MyChart  If the video visit is dropped, the invitation should be resent by: Send to e-mail at: [email or web address]  Will anyone else be joining your video visit? No    Video Start Time: 11:28 AM  Video-Visit Details    Type of service:  Video Visit    Video End Time:12:06 PM    Originating Location (pt. Location): Home    Distant Location (provider location):  [facility]     Platform used for Video Visit: [employer]     Answers for HPI/ROS submitted by the patient on 1/3/2022  General Symptoms: No  Skin Symptoms: No  HENT Symptoms: Yes  EYE SYMPTOMS: No  HEART SYMPTOMS: Yes  LUNG SYMPTOMS: Yes  INTESTINAL SYMPTOMS: No  URINARY SYMPTOMS: No  REPRODUCTIVE SYMPTOMS: No  SKELETAL SYMPTOMS: No  BLOOD SYMPTOMS: No  NERVOUS SYSTEM SYMPTOMS: No  MENTAL HEALTH SYMPTOMS: No  Ear pain: No  Ear discharge: No  Hearing loss: No  Tinnitus: No  Nosebleeds: No  Congestion: Yes  Sinus pain: No  Trouble swallowing: No   Voice hoarseness: No  Mouth sores: No  Sore throat: No  Tooth pain: No  Gum tenderness: No  Bleeding gums: No  Change in taste: No  Change in sense of smell: No  Dry mouth: No  Hearing aid used: No  Neck lump: No  Chest pain or pressure: No  Fast or irregular heartbeat: Yes  Pain in legs with walking: No  Trouble breathing while lying down: Yes  Fingers or toes appear blue: No  High blood pressure: No  Low blood pressure: No  Fainting: No  Murmurs: Yes  Pacemaker: No  Varicose veins: No  Edema or swelling: No  Wake up at night with shortness of breath: Yes  Light-headedness: No  Exercise intolerance: Yes  Cough: No  Sputum or phlegm: No  Coughing up blood: No  Difficulty breating or shortness of breath: Yes  Snoring: No  Wheezing: No  Difficulty breathing on exertion: Yes  Nighttime Cough: No  Difficulty breathing when lying flat: Yes    Additional 15 minutes on the date of  service was spent performing the following:    -Preparing to see the patient  -Obtaining and/or reviewing separately obtained history   -Ordering medications, tests, or procedures   -Documenting clinical information in the electronic or other health record     Thank you for the opportunity to participate in the care of  Angie Lopes.    Assessment and Plan:    In summary Angie Lopes is a 46 year old year old male here for sleep disturbance.  1. Non-restorative sleep/Snoring/Other Parasomnia/Paroxysmal Atrial Fibrillation  The cause of the patient's symptoms may be multifactorial in nature.  Part of differential diagnosis includes REM sleep related sleep behavior disorder as well as sleep disordered breathing.  I will need to proceed with an in lab sleep study with seizure montage.  In the meantime, I strongly advised the patient to sleep alone and is safe proof of sleeping environment.  Return to clinic to review the results with me after it has been interpreted.  - SLEEP EVALUATION & MANAGEMENT REFERRAL - ADULT -  - Comprehensive Sleep Study; Future    History of present illness:    He is a 46 year old male who comes to the virtual clinic with a chief complaint of nonrestorative sleep has been going on for more than a year.  While the patient denies any episodes of witnessed apnea, his wife has noticed that he does occasionally snore.  Furthermore the patient has been suffering from paroxysmal atrial fibrillation for more than a year.  He has occasionally woken up drenched in sweat.  He also suffer from waking up with vertigo for about a week.  He has noticed that his symptoms have improved since he eliminated caffeine from his diet.  His wife does mention that he has been acting out in his sleep at least once a month for more than a year.  He has episodes of dreaming that he is kicking a soccer ball and would end up kicking her shin.  He has also other times dreamt that he was defending himself and kicked  his wife in sleep.  The patient also complains of waking up occasionally feeling short of breath.  The patient denies any episodes of tongue biting, sleep sex or sleepwalking.  He also denies any episodes of loss of bowel or bladder function.     Ideal Sleep-Wake Cycle(devoid of societal pressure):    Patient would try to initiate sleep at around Midnight-1 AM with a sleep latency of 15-20 minutes. The patient would have 3-4 awakenings. Final wake up time is around Noon.    GEMA:  GEMA Total Score: 7  Total score - [place]: 1 (1/3/2022 11:09 AM)    Patient told to return in one week after the sleep study is interpreted.    Patient Active Problem List   Diagnosis     CARDIOVASCULAR SCREENING; LDL GOAL LESS THAN 160     S/P vasectomy     External hemorrhoids     History reviewed. No pertinent past medical history.  Past Surgical History:   Procedure Laterality Date     VASECTOMY  2004     Current Outpatient Medications   Medication Sig Dispense Refill     Ascorbic Acid (VITAMIN C PO)        diltiazem (CARDIZEM) 30 MG tablet Take 1 tablet (30 mg) by mouth 3 times daily as needed (with AF episode)       Omega-3 Fatty Acids (FISH OIL OMEGA-3 PO)        VITAMIN D PO        Zinc Acetate, Oral, (ZINC ACETATE PO)        Patient has no known allergies.  Social History     Socioeconomic History     Marital status:      Spouse name: Not on file     Number of children: Not on file     Years of education: Not on file     Highest education level: Not on file   Occupational History     Not on file   Tobacco Use     Smoking status: Never Smoker     Smokeless tobacco: Never Used   Substance and Sexual Activity     Alcohol use: Yes     Comment: occ. wine 3-4 times a week      Drug use: No     Sexual activity: Yes     Partners: Female     Birth control/protection: None   Other Topics Concern     Parent/sibling w/ CABG, MI or angioplasty before 65F 55M? No   Social History Narrative     Not on file     Social Determinants of Health      Financial Resource Strain: Not on file   Food Insecurity: Not on file   Transportation Needs: Not on file   Physical Activity: Not on file   Stress: Not on file   Social Connections: Not on file   Intimate Partner Violence: Not on file   Housing Stability: Not on file     Family History   Problem Relation Age of Onset     Lipids Mother      Hypertension Mother      Atrial fibrillation Father      Cancer - colorectal Maternal Grandmother         Physical Exam:  GEN: NAD,   Head: Normocephalic.  EYES: EOMI  ENT: Oropharynx is clear, [place] class 4+ airway.   Psych: normal mood, normal affect  Snore test:(+)     Labs/Studies:     No results found for: PH, PHARTERIAL, PO2, [id number], SAT, PCO2, HCO3, BASEEXCESS, YOMAIRA, BEB  Lab Results   Component Value Date    TSH 1.53 11/15/2021    TSH 1.08 10/21/2021     Lab Results   Component Value Date    GLC 88 11/15/2021    GLC 98 10/21/2021     Lab Results   Component Value Date    HGB 15.3 11/15/2021    HGB 11.1 (L) 10/21/2021     Lab Results   Component Value Date    BUN 17 11/15/2021    BUN 15 10/21/2021    CR 1.24 11/15/2021    CR 1.29 (H) 10/21/2021     Lab Results   Component Value Date    AST 16 11/15/2021    AST 17 10/21/2021    ALT 33 11/15/2021    ALT 33 10/21/2021    ALKPHOS 77 11/15/2021    ALKPHOS 62 10/21/2021    BILITOTAL 0.6 11/15/2021    BILITOTAL 0.8 10/21/2021     No results found for: UAMP, UBARB, BENZODIAZEUR, UCANN, UCOC, OPIT, UPCP    Recent Labs   Lab Test 11/15/21  1552 10/21/21  1251    140   POTASSIUM 4.1 4.1   CHLORIDE 106 108   CO2 26 28   ANIONGAP 6 4   GLC 88 98   BUN 17 15   CR 1.24 1.29*   JAKE 9.5 9.2       Ferritin   Date Value Ref Range Status   11/15/2021 120 26 - 388 ng/mL Final       Irineo Rao DO  Board Certified in Internal Medicine and Sleep Medicine    (Note created with Dragon voice recognition and unintended spelling errors and word substitutions may occur)

## 2022-01-03 NOTE — PATIENT INSTRUCTIONS
Your BMI is Body mass index is 25.75 kg/m .    What is BMI?  Body mass index (BMI) is one way to tell whether you are at a healthy weight, overweight, or obese. It measures your weight in relation to your height.  A BMI of 18.5 to 24.9 is in the healthy range. A person with a BMI of 25 to 29.9 is considered overweight, and someone with a BMI of 30 or greater is considered obese.  Another way to find out if you are at risk for health problems caused by overweight and obesity is to measure your waist. If you are a woman and your waist is more than 35 inches, or if you are a man and your waist is more than 40 inches, your risk of disease may be higher.  More than two-thirds of American adults are considered overweight or obese. Being overweight or obese increases the risk for further weight gain.  Excess weight may lead to heart disease and diabetes. Creating and following plans for healthy eating and physical activity may help you improve your health.    Methods for maintaining or losing weight.  Weight control is part of healthy lifestyle and includes exercise, emotional health, and healthy eating habits.  Careful eating habits lifelong is the mainstay of weight control.  Though there are significant health benefits from weight loss, long-term weight loss with diet alone may be very difficult to achieve- studies show long-term success with dietary management in less than 10% of people. Attaining a healthy weight may be especially difficult to achieve in those with severe obesity. In some cases, medications, devices and surgical management might be considered.    What can you do?  If you are overweight or obese and are interested in methods for weight loss, you should discuss this with your provider. In addition, we recommend that you review healthy life styles and methods for weight loss available through the National Institutes of Health patient information sites:    http://win.niddk.nih.gov/publications/index.htm    I strongly advised that you sleep alone and safe prove her sleeping environment.    Patient education: What is a sleep study?     What is a sleep study? -- A sleep study is a test that measures how well you sleep and checks for sleep problems. For some sleep studies, you stay overnight in a sleep lab at a hospital or sleep center.     What happens during a sleep study? -- Before you go to sleep, a technician attaches small, sticky patches called  electrodes  to your head, chest, and legs. He or she will also place a small tube beneath your nose and might wrap 1 or 2 belts around your chest.   Each of these items has wires that connect to monitors. The monitors record your movement, brain activity, breathing, and other body functions while you sleep.  If you have a history of trouble falling asleep, your doctor might prescribe a medicine to help you fall asleep in the lab. If you have never taken the medicine before, your doctor might ask you take it on a night before your sleep study to see how it affects you.   Why might my doctor order a sleep study? -- Your doctor will order a sleep study if he or she thinks you have sleep apnea or a different condition that makes you:   ?Have sudden jerking leg movements while you sleep, called  periodic limb movements.    ?Feel very sleepy during the day and fall asleep all of a sudden, called  narcolepsy.    ?Have trouble falling asleep or staying asleep over a long period of time, called  chronic insomnia.    ?Do odd things while you sleep, such as walking.  How should I prepare for a sleep study? -- On the day of your sleep study, you should:   ?Avoid alcohol   ?Avoid drinking coffee, tea, sodas, and other drinks that have caffeine in the afternoon and evening   ?Take all of your regular medicines     The cost of care estimate line is [phone]. They are able to give the patient an estimate of the charges and also an  estimate of their insurance coverage/patient responsibility.   After your sleep study is performed, please call us at [phone] or [phone]  to schedule for a follow up to review the results of the sleep study.

## 2022-02-16 ENCOUNTER — OFFICE VISIT (OUTPATIENT)
Dept: CARDIOLOGY | Facility: CLINIC | Age: 47
End: 2022-02-16
Attending: INTERNAL MEDICINE
Payer: COMMERCIAL

## 2022-02-16 VITALS
DIASTOLIC BLOOD PRESSURE: 89 MMHG | HEART RATE: 57 BPM | HEIGHT: 75 IN | WEIGHT: 214.8 LBS | BODY MASS INDEX: 26.71 KG/M2 | OXYGEN SATURATION: 100 % | SYSTOLIC BLOOD PRESSURE: 145 MMHG

## 2022-02-16 DIAGNOSIS — I48.0 PAF (PAROXYSMAL ATRIAL FIBRILLATION) (H): ICD-10-CM

## 2022-02-16 PROCEDURE — G0463 HOSPITAL OUTPT CLINIC VISIT: HCPCS | Mod: 25

## 2022-02-16 PROCEDURE — 99214 OFFICE O/P EST MOD 30 MIN: CPT | Performed by: INTERNAL MEDICINE

## 2022-02-16 PROCEDURE — 93005 ELECTROCARDIOGRAM TRACING: CPT

## 2022-02-16 ASSESSMENT — PAIN SCALES - GENERAL: PAINLEVEL: NO PAIN (0)

## 2022-02-16 NOTE — PATIENT INSTRUCTIONS
Plan:     Follow-up in 1 year      Your Care Team:  EP Cardiology   Telephone Number     Flora Hamilton RN [phone]     For scheduling appts or procedures:    Macy Espinoza   [phone]   For the Device Clinic (Pacemakers, ICDs, Loop Recorders)    During business hours: [phone]  After business hours:   [phone]- select option 4 and ask for job code [id number].       Cardiovascular Clinic:   [address] [place] [address]. [place] [id number]      As always, Thank you for trusting us with your health care needs!

## 2022-02-16 NOTE — LETTER
2022      RE: Angie Lopes  [address]  [facility] [address]       Dear Colleague,    Thank you for the opportunity to participate in the care of your patient, Angie Lopes, at the [facility] [place] at [facility]. Please see a copy of my visit note below.        ELECTROPHYSIOLOGY CLINIC VISIT  Assessment/Recommendations   Assessment/Plan:    Mr. Lopes is a 46 year old male who has a past medical history significant for PAF (CHADSVASC 0) and GIB s/p polypectomy. He presents today for follow up. He recently saw sleep medicine who plans for sleep study. He reports feeling OK. He stopped caffeine at end of 2021. He does continue to have palpitations lasting up to several hours at a time. He denies chest discomfort,  abdominal fullness/bloating or peripheral edema, shortness of breath, paroxysmal nocturnal dyspnea, orthopnea, lightheadedness, dizziness, pre-syncope, or syncope. A zio patch monitor from 22-22 showed 6% PAF burden up to 18 hours 41 minutes and 60 NSVT that are likely AF with aberrant conduction. 10 symptom activations 2 showed AF and 8 showed sinus. Presenting 12 lead ECG shows SB Vent Rate 56 bpm,  ms, QRS 88 ms, QTc 409 ms. Current cardiac medications include: Diltiazem.     Paroxsymal Atrial Fibrillation:   We discussed in detail with the patient management/treatment options for Jatinder includin. Stroke Prophylaxis:  CHADSVASC=  0, corresponding to a 0.2-0.5% annual stroke / systemic emolism event rate. indicating NO need for long term oral anticoagulation.   2. Rate Control:  PRN diltiazem for palpitations  3. Rhythm Control: Cardioversion, Antiarrhythmics and/or ablation are options for rhythm control. He was scheduled for DCCV; however, he self converted and it was cancelled. He continues to have PAF 6% burden on recent monitor. We discussed use of AAT and/or ablation. We discussed  indications, risks, and benefits of each approach in detail. He wishes to defer further rhythm control for now, but will consider in future if needed.   4. Risk Factor Management: BP control, and continue MERA evaluation.        Follow up in 1 year or sooner if need arises.        History of Present Illness/Subjective    Mr. Angie Lopes is a 46 year old male who comes in today for EP consultation of PAF.    Mr. Lopes is a 46 year old male who has a past medical history significant for PAF (CHADSVASC 0) and GIB s/p polypectomy.     He reports having symptoms of palpitations for last 5 years. He states he would have intermittent palpitations lasting up to 30 minutes and then self resolving. Then in 8/2021, he started having increasing frequency of symptoms. He reports in 6/2021 he had GIB and had polyp removed. Of note, his Hgb is slowly downtredning again. He then presented to [facility] on 10/21/21 with a 5 day history of palpitation and orthostatic dizziness. He was found to be in AF. He was started on Eliquis and Diltiazem and arranged for close EP clinic visit.     EP Visit 10/22/21: He presents today to establish care.  Patient denies any significant drug use or alcoholism.  Patient appears to be in excellent health but denies any special  diets. He reports feeling that he is out of AF now.  He reports feeling interrupted sleep, never had a sleep evaluation. He denies any melena or bright blood per rectum. He denies chest discomfort, palpitations, abdominal fullness/bloating or peripheral edema, shortness of breath, paroxysmal nocturnal dyspnea, orthopnea, lightheadedness, dizziness, pre-syncope, or syncope. Echo today shows normal structure and function and does show he is now in SR. Current cardiac medications include: Eliquis and Diltiazem.     He presents today for follow up. He recently saw sleep medicine who plans for sleep study. He reports feeling OK. He stopped caffeine at end of 12/2021. He  "does continue to have palpitations lasting up to several hours at a time. He denies chest discomfort,  abdominal fullness/bloating or peripheral edema, shortness of breath, paroxysmal nocturnal dyspnea, orthopnea, lightheadedness, dizziness, pre-syncope, or syncope. A zio patch monitor from 1/4/22-1/17/22 showed 6% PAF burden up to 18 hours 41 minutes and 60 NSVT that are likely AF with aberrant conduction. 10 symptom activations 2 showed AF and 8 showed sinus. Presenting 12 lead ECG shows SB Vent Rate 56 bpm,  ms, QRS 88 ms, QTc 409 ms. Current cardiac medications include: Diltiazem.     I have reviewed and updated the patient's Past Medical History, Social History, Family History and Medication List.     Cardiographics (Personally Reviewed) :   10/22/21 Echo:   Interpretation Summary  Global and regional left ventricular function is normal with an EF of 60-65%.  Right ventricular function, chamber size, wall motion, and thickness are normal.  The inferior vena cava is normal.  No pericardial effusion is present.  There is no prior study for direct comparison.         Physical Examination   BP (!) 145/89 (BP Location: Right arm, Patient Position: Chair, Cuff Size: Adult Regular)   Pulse 57   Ht 1.9 m (6' 2.8\")   Wt 97.4 kg (214 lb 12.8 oz)   SpO2 100%   BMI 26.99 kg/m    Wt Readings from Last 3 Encounters:   01/03/22 93.4 kg (206 lb)   11/15/21 95.7 kg (211 lb)   06/02/21 96.2 kg (212 lb)     General Appearance:   Alert, well-appearing and in no acute distress.   HEENT: Atraumatic, normocephalic. PERRL.  MMM.   Chest/Lungs:   Respirations unlabored.  Lungs are clear to auscultation.   Cardiovascular:   Regular rate and rhythm.  S1/S2. No murmur.    Abdomen:  Soft, nontender, nondistended.   Extremities: No cyanosis or clubbing. No edema.    Musculoskeletal: Moves all extremities.  Gait normal.   Skin: Warm, dry, intact.    Neurologic: Mood and affect are appropriate.  Alert and oriented to person, place, " time, and situation.            Medications  Allergies   Current Outpatient Medications   Medication Sig Dispense Refill     Ascorbic Acid (VITAMIN C PO)        diltiazem (CARDIZEM) 30 MG tablet Take 1 tablet (30 mg) by mouth 3 times daily as needed (with AF episode)       Omega-3 Fatty Acids (FISH OIL OMEGA-3 PO)        VITAMIN D PO        Zinc Acetate, Oral, (ZINC ACETATE PO)       No Known Allergies      Lab Results (Personally Reviewed)    Chemistry/lipid CBC Cardiac Enzymes/BNP/TSH/INR   Lab Results   Component Value Date    BUN 17 11/15/2021     11/15/2021    CO2 26 11/15/2021     Creatinine   Date Value Ref Range Status   11/15/2021 1.24 0.66 - 1.25 mg/dL Final   06/11/2021 1.18 0.66 - 1.25 mg/dL Final       Lab Results   Component Value Date    CHOL 172 06/11/2021    HDL 66 06/11/2021    LDL 97 06/11/2021      Lab Results   Component Value Date    WBC 7.0 11/15/2021    HGB 15.3 11/15/2021    HCT 48.8 11/15/2021    MCV 80 11/15/2021     11/15/2021    Lab Results   Component Value Date    TSH 1.53 11/15/2021    INR 1.02 10/21/2021        The patient states understanding and is agreeable with the plan.     Amanuel Chavarria MD [facility]  [facility]iology - Electrophysiology    Total time spent on patient visit, reviewing notes, imaging, labs, orders, and completing necessary documentation: 30 minutes.  >50% of visit spent on counseling patient and/or coordination of care.          l

## 2022-02-16 NOTE — PROGRESS NOTES
ELECTROPHYSIOLOGY CLINIC VISIT  Assessment/Recommendations   Assessment/Plan:    Mr. Lopes is a 46 year old male who has a past medical history significant for PAF (CHADSVASC 0) and GIB s/p polypectomy. He presents today for follow up. He recently saw sleep medicine who plans for sleep study. He reports feeling OK. He stopped caffeine at end of 2021. He does continue to have palpitations lasting up to several hours at a time. He denies chest discomfort,  abdominal fullness/bloating or peripheral edema, shortness of breath, paroxysmal nocturnal dyspnea, orthopnea, lightheadedness, dizziness, pre-syncope, or syncope. A zio patch monitor from 22-22 showed 6% PAF burden up to 18 hours 41 minutes and 60 NSVT that are likely AF with aberrant conduction. 10 symptom activations 2 showed AF and 8 showed sinus. Presenting 12 lead ECG shows SB Vent Rate 56 bpm,  ms, QRS 88 ms, QTc 409 ms. Current cardiac medications include: Diltiazem.     Paroxsymal Atrial Fibrillation:   We discussed in detail with the patient management/treatment options for Jatinder includin. Stroke Prophylaxis:  CHADSVASC=  0, corresponding to a 0.2-0.5% annual stroke / systemic emolism event rate. indicating NO need for long term oral anticoagulation.   2. Rate Control:  PRN diltiazem for palpitations  3. Rhythm Control: Cardioversion, Antiarrhythmics and/or ablation are options for rhythm control. He was scheduled for DCCV; however, he self converted and it was cancelled. He continues to have PAF 6% burden on recent monitor. We discussed use of AAT and/or ablation. We discussed indications, risks, and benefits of each approach in detail. He wishes to defer further rhythm control for now, but will consider in future if needed.   4. Risk Factor Management: BP control, and continue MERA evaluation.        Follow up in 1 year or sooner if need arises.        History of Present Illness/Subjective    Mr. Angie Lopes is a 46 year  old male who comes in today for EP consultation of PAF.    Mr. Lopes is a 46 year old male who has a past medical history significant for PAF (CHADSVASC 0) and GIB s/p polypectomy.     He reports having symptoms of palpitations for last 5 years. He states he would have intermittent palpitations lasting up to 30 minutes and then self resolving. Then in 8/2021, he started having increasing frequency of symptoms. He reports in 6/2021 he had GIB and had polyp removed. Of note, his Hgb is slowly downtredning again. He then presented to [facility] on 10/21/21 with a 5 day history of palpitation and orthostatic dizziness. He was found to be in AF. He was started on Eliquis and Diltiazem and arranged for close EP clinic visit.     EP Visit 10/22/21: He presents today to establish care.  Patient denies any significant drug use or alcoholism.  Patient appears to be in excellent health but denies any special  diets. He reports feeling that he is out of AF now.  He reports feeling interrupted sleep, never had a sleep evaluation. He denies any melena or bright blood per rectum. He denies chest discomfort, palpitations, abdominal fullness/bloating or peripheral edema, shortness of breath, paroxysmal nocturnal dyspnea, orthopnea, lightheadedness, dizziness, pre-syncope, or syncope. Echo today shows normal structure and function and does show he is now in SR. Current cardiac medications include: Eliquis and Diltiazem.     He presents today for follow up. He recently saw sleep medicine who plans for sleep study. He reports feeling OK. He stopped caffeine at end of 12/2021. He does continue to have palpitations lasting up to several hours at a time. He denies chest discomfort,  abdominal fullness/bloating or peripheral edema, shortness of breath, paroxysmal nocturnal dyspnea, orthopnea, lightheadedness, dizziness, pre-syncope, or syncope. A zio patch monitor from 1/4/22-1/17/22 showed 6% PAF burden up to 18 hours 41 minutes  "and 60 NSVT that are likely AF with aberrant conduction. 10 symptom activations 2 showed AF and 8 showed sinus. Presenting 12 lead ECG shows SB Vent Rate 56 bpm,  ms, QRS 88 ms, QTc 409 ms. Current cardiac medications include: Diltiazem.     I have reviewed and updated the patient's Past Medical History, Social History, Family History and Medication List.     Cardiographics (Personally Reviewed) :   10/22/21 Echo:   Interpretation Summary  Global and regional left ventricular function is normal with an EF of 60-65%.  Right ventricular function, chamber size, wall motion, and thickness are normal.  The inferior vena cava is normal.  No pericardial effusion is present.  There is no prior study for direct comparison.         Physical Examination   BP (!) 145/89 (BP Location: Right arm, Patient Position: Chair, Cuff Size: Adult Regular)   Pulse 57   Ht 1.9 m (6' 2.8\")   Wt 97.4 kg (214 lb 12.8 oz)   SpO2 100%   BMI 26.99 kg/m    Wt Readings from Last 3 Encounters:   01/03/22 93.4 kg (206 lb)   11/15/21 95.7 kg (211 lb)   06/02/21 96.2 kg (212 lb)     General Appearance:   Alert, well-appearing and in no acute distress.   HEENT: Atraumatic, normocephalic. PERRL.  MMM.   Chest/Lungs:   Respirations unlabored.  Lungs are clear to auscultation.   Cardiovascular:   Regular rate and rhythm.  S1/S2. No murmur.    Abdomen:  Soft, nontender, nondistended.   Extremities: No cyanosis or clubbing. No edema.    Musculoskeletal: Moves all extremities.  Gait normal.   Skin: Warm, dry, intact.    Neurologic: Mood and affect are appropriate.  Alert and oriented to person, place, time, and situation.            Medications  Allergies   Current Outpatient Medications   Medication Sig Dispense Refill     Ascorbic Acid (VITAMIN C PO)        diltiazem (CARDIZEM) 30 MG tablet Take 1 tablet (30 mg) by mouth 3 times daily as needed (with AF episode)       Omega-3 Fatty Acids (FISH OIL OMEGA-3 PO)        VITAMIN D PO        Zinc " Acetate, Oral, (ZINC ACETATE PO)       No Known Allergies      Lab Results (Personally Reviewed)    Chemistry/lipid CBC Cardiac Enzymes/BNP/TSH/INR   Lab Results   Component Value Date    BUN 17 11/15/2021     11/15/2021    CO2 26 11/15/2021     Creatinine   Date Value Ref Range Status   11/15/2021 1.24 0.66 - 1.25 mg/dL Final   06/11/2021 1.18 0.66 - 1.25 mg/dL Final       Lab Results   Component Value Date    CHOL 172 06/11/2021    HDL 66 06/11/2021    LDL 97 06/11/2021      Lab Results   Component Value Date    WBC 7.0 11/15/2021    HGB 15.3 11/15/2021    HCT 48.8 11/15/2021    MCV 80 11/15/2021     11/15/2021    Lab Results   Component Value Date    TSH 1.53 11/15/2021    INR 1.02 10/21/2021        The patient states understanding and is agreeable with the plan.     Amanuel Chavarria MD [facility]  Cardiology - Electrophysiology    Total time spent on patient visit, reviewing notes, imaging, labs, orders, and completing necessary documentation: 30 minutes.  >50% of visit spent on counseling patient and/or coordination of care.          l

## 2022-02-16 NOTE — NURSING NOTE
Chief Complaint   Patient presents with     Follow Up     return arrythmia- 5 mo follow-up paroxysmal atrial fibrillation      Vitals were taken, medications reconciled, and EKG was performed.    Manuel Lombardi, TORI  4:09 PM

## 2022-02-19 LAB
ATRIAL RATE - MUSE: 56 BPM
DIASTOLIC BLOOD PRESSURE - MUSE: NORMAL MMHG
INTERPRETATION ECG - MUSE: NORMAL
P AXIS - MUSE: 60 DEGREES
PR INTERVAL - MUSE: 172 MS
QRS DURATION - MUSE: 88 MS
QT - MUSE: 424 MS
QTC - MUSE: 409 MS
R AXIS - MUSE: -3 DEGREES
SYSTOLIC BLOOD PRESSURE - MUSE: NORMAL MMHG
T AXIS - MUSE: 27 DEGREES
VENTRICULAR RATE- MUSE: 56 BPM

## 2022-08-14 ENCOUNTER — HEALTH MAINTENANCE LETTER (OUTPATIENT)
Age: 47
End: 2022-08-14

## 2022-11-19 ENCOUNTER — HEALTH MAINTENANCE LETTER (OUTPATIENT)
Age: 47
End: 2022-11-19

## 2022-12-08 ENCOUNTER — TELEPHONE (OUTPATIENT)
Dept: CARDIOLOGY | Facility: CLINIC | Age: 47
End: 2022-12-08

## 2022-12-08 NOTE — TELEPHONE ENCOUNTER
Patient calling EP Scheduling line because he sent a "2,10E+07"t message to Dr. Chavarria's team and wants to make sure it's addressed quickly. He is in [place] and having symptoms without his medications.     Teams message sent to Ingris who is covering for Flora today.     Macy Espinoza  [facility] Electrophysiology   [phone]

## 2022-12-27 ENCOUNTER — MYC MEDICAL ADVICE (OUTPATIENT)
Dept: CARDIOLOGY | Facility: CLINIC | Age: 47
End: 2022-12-27

## 2022-12-27 DIAGNOSIS — I48.0 PAF (PAROXYSMAL ATRIAL FIBRILLATION) (H): Primary | ICD-10-CM

## 2023-01-05 ENCOUNTER — ALLIED HEALTH/NURSE VISIT (OUTPATIENT)
Dept: CARDIOLOGY | Facility: CLINIC | Age: 48
End: 2023-01-05
Attending: INTERNAL MEDICINE
Payer: COMMERCIAL

## 2023-01-05 DIAGNOSIS — I48.0 PAF (PAROXYSMAL ATRIAL FIBRILLATION) (H): ICD-10-CM

## 2023-02-06 ENCOUNTER — TELEPHONE (OUTPATIENT)
Dept: FAMILY MEDICINE | Facility: CLINIC | Age: 48
End: 2023-02-06
Payer: COMMERCIAL

## 2023-02-06 DIAGNOSIS — R06.83 SNORING: ICD-10-CM

## 2023-02-06 DIAGNOSIS — I48.0 PAF (PAROXYSMAL ATRIAL FIBRILLATION) (H): ICD-10-CM

## 2023-02-06 DIAGNOSIS — G47.59 OTHER PARASOMNIA: ICD-10-CM

## 2023-02-06 DIAGNOSIS — G47.8 NON-RESTORATIVE SLEEP: Primary | ICD-10-CM

## 2023-02-06 NOTE — TELEPHONE ENCOUNTER
Patient Returning Call    Reason for call: Sleep Study Order     Information relayed to patient:  YA    Patient has additional questions:  No    What are your questions/concerns:  PT is wondering if a new order for a sleep study can be placed. Or would he need to make another appt with Maira for the order to be placed? PT last saw Maira on 2022 and had an order for a sleep study in, but it has since .     Could we send this information to you in [employer] or would you prefer to receive a phone call?:   Patient would like to be contacted via [employer]

## 2023-02-09 ENCOUNTER — TELEPHONE (OUTPATIENT)
Dept: SLEEP MEDICINE | Facility: CLINIC | Age: 48
End: 2023-02-09
Payer: COMMERCIAL

## 2023-02-14 NOTE — PROGRESS NOTES
ELECTROPHYSIOLOGY CLINIC VISIT  Assessment/Recommendations   Assessment/Plan:    Mr. Lopes is a 46 year old male who has a past medical history significant for PAF (CHADSVASC 0) and GIB s/p polypectomy. He presents for follow up. Since last visit he wrote in with an increase in symptoms and severity in December. He was in [place] tending to his father who was passing. Reports at this time he was having the A Fib for 6-7 days in a row. When he takes the diltiazem, reports relief of symptoms in 1-2 hours. The longest episode was about 48 hours. With the A fib episodes he has chest discomfort, palpitations and dyspnea. Zio was completed on - which showed 3% AF burden, symptoms correlated with sinus, ectopy and AF. Reports he felt he had a lesser amount of A fib then normal when zio was on. He denies chest discomfort, peripheral edema, pre-syncope, or syncope. Presenting 12 lead ECG shows SR Vent Rate 59 bpm,  ms, QRS 84 ms, QTc 413 ms. Current cardiac medications include: Diltiazem.       Paroxsymal Atrial Fibrillation:   We discussed in detail with the patient management/treatment options for A.fib includin. Stroke Prophylaxis:  CHADSVASC=  0, corresponding to a 0.2-0.5% annual stroke / systemic emolism event rate. indicating NO need for long term oral anticoagulation.   2. Rate Control: PRN diltiazem for palpitations  3. Rhythm Control: Cardioversion, Antiarrhythmics and/or ablation are options for rhythm control. He was scheduled for DCCV; however, he self converted and it was cancelled. Recent monitor with 3% AF burden and occasional ectopy. Pt does report he did not have as much A Fib when the monitor was on. Had extended A Fib episode following the monitor. Discussed AAT and/or ablation as further treatment for increased symptoms and burden. He is going to see sleep medicine in April and would like to wait until after to decide this. He will follow up with a ziopatch completed to  reassess burden, at that time, will decide if he would like to pursue AAT or ablation.     4. Risk Factor Management: BP control, and continue MERA evaluation.  Patient will be seen by sleep medicine in April.       Follow up 3-4 months after seeing sleep medicine with nagi marrero completed prior to visit.        History of Present Illness/Subjective    Mr. Angie Lopes is a 46 year old male who comes in today for EP consultation of PAF.    Mr. Lopes is a 46 year old male who has a past medical history significant for PAF (CHADSVASC 0) and GIB s/p polypectomy.     He reports having symptoms of palpitations for last 5 years. He states he would have intermittent palpitations lasting up to 30 minutes and then self resolving. Then in 8/2021, he started having increasing frequency of symptoms. He reports in 6/2021 he had GIB and had polyp removed. Of note, his Hgb is slowly downtredning again. He then presented to [facility] on 10/[facility]/21 with a 5 day history of palpitation and orthostatic dizziness. He was found to be in AF. He was started on Eliquis and Diltiazem and arranged for close EP clinic visit.     EP Visit 10/22/21: He presents today to establish care.  Patient denies any significant drug use or alcoholism.  Patient appears to be in excellent health but denies any special  diets. He reports feeling that he is out of AF now.  He reports feeling interrupted sleep, never had a sleep evaluation. He denies any melena or bright blood per rectum. He denies chest discomfort, palpitations, abdominal fullness/bloating or peripheral edema, shortness of breath, paroxysmal nocturnal dyspnea, orthopnea, lightheadedness, dizziness, pre-syncope, or syncope. Echo today shows normal structure and function and does show he is now in SR. Current cardiac medications include: Eliquis and Diltiazem.     EP Visit: 2/16/22: He presents today for follow up. He recently saw sleep medicine who plans for sleep study. He reports  feeling OK. He stopped caffeine at end of 12/2021. He does continue to have palpitations lasting up to several hours at a time. He denies chest discomfort,  abdominal fullness/bloating or peripheral edema, shortness of breath, paroxysmal nocturnal dyspnea, orthopnea, lightheadedness, dizziness, pre-syncope, or syncope. A zio patch monitor from 1/4/22-1/17/22 showed 6% PAF burden up to 18 hours 41 minutes and 60 NSVT that are likely AF with aberrant conduction. 10 symptom activations 2 showed AF and 8 showed sinus. Presenting 12 lead ECG shows SB Vent Rate 56 bpm,  ms, QRS 88 ms, QTc 409 ms. Current cardiac medications include: Diltiazem.     He presents for follow up. Since last visit he wrote in with an increase in symptoms and severity in December. [place] was in [place] to his father who was passing. Reports at this time he was having the A Fib for 6-7 days in a row. When he takes the diltiazem, reports relief of symptoms in 1-2 hours. The longest episode was about 48 hours. With the A fib episodes he has chest discomfort, palpitations and dyspnea. Zio was completed on 1/8-1/22 which showed 3% AF burden, symptoms correlated with sinus, ectopy and AF. Reports he felt he had a lesser amount of A fib then normal when zio was on. He denies chest discomfort, peripheral edema, pre-syncope, or syncope. Presenting 12 lead ECG shows SR Vent Rate 59 bpm,  ms, QRS 84 ms, QTc 413 ms. Current cardiac medications include: Diltiazem.     I have reviewed and updated the patient's Past Medical History, Social History, Family History and Medication List.     Cardiographics (Personally Reviewed) :   10/22/21 Echo:   Interpretation Summary  Global and regional left ventricular function is normal with an EF of 60-65%.  Right ventricular function, chamber size, wall motion, and thickness are normal.  The inferior vena cava is normal.  No pericardial effusion is present.  There is no prior study for direct comparison.    "    Physical Examination   BP (!) 144/89 (BP Location: Right arm, Patient Position: Supine, Cuff Size: Adult Regular)   Pulse 61   Ht 1.905 m (6' 3\")   Wt 98.7 kg (217 lb 8 oz)   SpO2 100%   BMI 27.19 kg/m    Wt Readings from Last 3 Encounters:   02/15/23 98.7 kg (217 lb 8 oz)   02/16/22 97.4 kg (214 lb 12.8 oz)   01/03/22 93.4 kg (206 lb)     General Appearance:   Alert, well-appearing and in no acute distress.   HEENT: Atraumatic, normocephalic. MMM.   Chest/Lungs:   Respirations unlabored.  Lungs are clear to auscultation.   Cardiovascular:   Regular rate and rhythm.  S1/S2. No murmur.    Abdomen:  Soft, nontender, nondistended.   Extremities: No cyanosis or clubbing. No edema.    Musculoskeletal: Moves all extremities.  Gait normal.   Skin: Warm, dry, intact.    Neurologic: Mood and affect are appropriate.  Alert and oriented to person, place, time, and situation.            Medications  Allergies   Current Outpatient Medications   Medication Sig Dispense Refill     Ascorbic Acid (VITAMIN C PO)        diltiazem (CARDIZEM) 30 MG tablet Take 1 tablet (30 mg) by mouth 3 times daily as needed (with AF episode) 20 tablet 0     Omega-3 Fatty Acids (FISH OIL OMEGA-3 PO)        vitamin B-Complex Take 1 tablet by mouth daily       VITAMIN D PO        Zinc Acetate, Oral, (ZINC ACETATE PO)       No Known Allergies      Lab Results (Personally Reviewed)    Chemistry/lipid CBC Cardiac Enzymes/BNP/TSH/INR   Lab Results   Component Value Date    BUN 17 11/15/2021     11/15/2021    CO2 26 11/15/2021     Creatinine   Date Value Ref Range Status   11/15/2021 1.24 0.66 - 1.25 mg/dL Final   06/11/2021 1.18 0.66 - 1.25 mg/dL Final       Lab Results   Component Value Date    CHOL 172 06/11/2021    HDL 66 06/11/2021    LDL 97 06/11/2021      Lab Results   Component Value Date    WBC 7.0 11/15/2021    HGB 15.3 11/15/2021    HCT 48.8 11/15/2021    MCV 80 11/15/2021     11/15/2021    Lab Results   Component Value Date "    TSH 1.53 11/15/2021    INR 1.02 10/21/2021        The patient states understanding and is agreeable with the plan.   This patient was seen and examined with Dr. Chavarria The above note reflects our joint assessment and plan.   Antonieta Martínez PA-C  [facility]  Electrophysiology Consult Service  Pager: [phone]    EP STAFF NOTE  I have seen and examined the patient as part of a shared visit with PRINCE Kelly.  I agree with the note above. I reviewed today's vital signs and medications. I have reviewed and discussed with the advanced practice provider their physical examination, assessment, and plan   Briefly, PAF, increasing burden, slow progression, recording convey a slightly mor frequent AF compared to Zio  My campuzano history/exam findings are: RRR.   The key management decisions made by me: has a trip planned, will reconvene after with another monitor to see the progression of PAF, he is considering options, we discussed AAT versus ablation..  Total time spent on patient visit, reviewing notes, imaging, labs, orders, and completing necessary documentation: 45 minutes.  >50% of visit spent on counseling patient and/or coordination of care.     Amanuel Chavarria MD [facility]  Cardiology - Electrophysiology

## 2023-02-15 ENCOUNTER — OFFICE VISIT (OUTPATIENT)
Dept: CARDIOLOGY | Facility: CLINIC | Age: 48
End: 2023-02-15
Attending: INTERNAL MEDICINE
Payer: COMMERCIAL

## 2023-02-15 VITALS
OXYGEN SATURATION: 100 % | WEIGHT: 217.5 LBS | DIASTOLIC BLOOD PRESSURE: 89 MMHG | HEIGHT: 75 IN | BODY MASS INDEX: 27.04 KG/M2 | SYSTOLIC BLOOD PRESSURE: 144 MMHG | HEART RATE: 61 BPM

## 2023-02-15 DIAGNOSIS — I48.0 PAF (PAROXYSMAL ATRIAL FIBRILLATION) (H): ICD-10-CM

## 2023-02-15 PROCEDURE — 99215 OFFICE O/P EST HI 40 MIN: CPT | Mod: FS

## 2023-02-15 PROCEDURE — 93005 ELECTROCARDIOGRAM TRACING: CPT

## 2023-02-15 PROCEDURE — G0463 HOSPITAL OUTPT CLINIC VISIT: HCPCS | Performed by: INTERNAL MEDICINE

## 2023-02-15 RX ORDER — DILTIAZEM HYDROCHLORIDE 30 MG/1
30 TABLET, FILM COATED ORAL 3 TIMES DAILY PRN
Qty: 20 TABLET | Refills: 0 | Status: SHIPPED | OUTPATIENT
Start: 2023-02-15 | End: 2023-04-25

## 2023-02-15 ASSESSMENT — PAIN SCALES - GENERAL: PAINLEVEL: MILD PAIN (3)

## 2023-02-15 NOTE — PATIENT INSTRUCTIONS
Plan:    Follow-up in 3 months with 14 day ziopatch worn 1 month prior      Your Care Team:  EP Cardiology   Telephone Number     Flora Hamilton RN [phone]    After business hours: [phone], ask for cardiologist on-call   Non-procedure scheduling:    Manda ZEPEDA   [phone]   Procedure scheduling:    Macy Espinoza   [phone]   Device Clinic (Pacemakers, ICDs, Loop Recorders)    During business hours: [phone]  After business hours:   [phone]- select option 4 and ask for job code [id number].       Cardiovascular Clinic:   [address] [place] [address]. [place] [address]      As always, thank you for trusting us with your health care needs!

## 2023-02-15 NOTE — NURSING NOTE
Chief Complaint   Patient presents with     Follow Up     Return EP- 1 yr follow-up paroxysmal atrial fibrillation.     Vitals were taken and medications reconciled.    TORI Colbert  1:36 PM

## 2023-02-15 NOTE — LETTER
2/15/2023      RE: Angie Lopes  [address]  [facility] [address]       Dear Colleague,    Thank you for the opportunity to participate in the care of your patient, Angie Lopes, at the [facility] [place] [facility]. Please see a copy of my visit note below.        ELECTROPHYSIOLOGY CLINIC VISIT  Assessment/Recommendations   Assessment/Plan:    Mr. Lopes is a 46 year old male who has a past medical history significant for PAF (CHADSVASC 0) and GIB s/p polypectomy. He presents for follow up. Since last visit he wrote in with an increase in symptoms and severity in December. [place] was in [place] to his father who was passing. Reports at this time he was having the A Fib for 6-7 days in a row. When he takes the diltiazem, reports relief of symptoms in 1-2 hours. The longest episode was about 48 hours. With the A fib episodes he has chest discomfort, palpitations and dyspnea. Zio was completed on - which showed 3% AF burden, symptoms correlated with sinus, ectopy and AF. Reports he felt he had a lesser amount of A fib then normal when zio was on. He denies chest discomfort, peripheral edema, pre-syncope, or syncope. Presenting 12 lead ECG shows SR Vent Rate 59 bpm,  ms, QRS 84 ms, QTc 413 ms. Current cardiac medications include: Diltiazem.       Paroxsymal Atrial Fibrillation:   We discussed in detail with the patient management/treatment options for A.fib includin. Stroke Prophylaxis:  CHADSVASC=  0, corresponding to a 0.2-0.5% annual stroke / systemic emolism event rate. indicating NO need for long term oral anticoagulation.   2. Rate Control: PRN diltiazem for palpitations  3. Rhythm Control: Cardioversion, Antiarrhythmics and/or ablation are options for rhythm control. He was scheduled for DCCV; however, he self converted and it was cancelled. Recent monitor with 3% AF burden and occasional ectopy. Pt  does report he did not have as much A Fib when the monitor was on. Had extended A Fib episode following the monitor. Discussed AAT and/or ablation as further treatment for increased symptoms and burden. He is going to see sleep medicine in April and would like to wait until after to decide this. He will follow up with a ziopatch completed to reassess burden, at that time, will decide if he would like to pursue AAT or ablation.     4. Risk Factor Management: BP control, and continue MERA evaluation.  Patient will be seen by sleep medicine in April.       Follow up 3-4 months after seeing sleep medicine with a ziopatch completed prior to visit.        History of Present Illness/Subjective    Mr. Angie Lopes is a 46 year old male who comes in today for EP consultation of PAF.    Mr. Lopes is a 46 year old male who has a past medical history significant for PAF (CHADSVASC 0) and GIB s/p polypectomy.     He reports having symptoms of palpitations for last 5 years. He states he would have intermittent palpitations lasting up to 30 minutes and then self resolving. Then in 8/2021, he started having increasing frequency of symptoms. He reports in 6/2021 he had GIB and had polyp removed. Of note, his Hgb is slowly downtredning again. He then presented to [facility] on 10/21/21 with a 5 day history of palpitation and orthostatic dizziness. He was found to be in AF. He was started on Eliquis and Diltiazem and arranged for close EP clinic visit.     EP Visit 10/22/21: He presents today to establish care.  Patient denies any significant drug use or alcoholism.  Patient appears to be in excellent health but denies any special  diets. He reports feeling that he is out of AF now.  He reports feeling interrupted sleep, never had a sleep evaluation. He denies any melena or bright blood per rectum. He denies chest discomfort, palpitations, abdominal fullness/bloating or peripheral edema, shortness of breath, paroxysmal  nocturnal dyspnea, orthopnea, lightheadedness, dizziness, pre-syncope, or syncope. Echo today shows normal structure and function and does show he is now in SR. Current cardiac medications include: Eliquis and Diltiazem.     EP Visit: 2/16/22: He presents today for follow up. He recently saw sleep medicine who plans for sleep study. He reports feeling OK. He stopped caffeine at end of 12/2021. He does continue to have palpitations lasting up to several hours at a time. He denies chest discomfort,  abdominal fullness/bloating or peripheral edema, shortness of breath, paroxysmal nocturnal dyspnea, orthopnea, lightheadedness, dizziness, pre-syncope, or syncope. A zio patch monitor from 1/4/22-1/17/22 showed 6% PAF burden up to 18 hours 41 minutes and 60 NSVT that are likely AF with aberrant conduction. 10 symptom activations 2 showed AF and 8 showed sinus. Presenting 12 lead ECG shows SB Vent Rate 56 bpm,  ms, QRS 88 ms, QTc 409 ms. Current cardiac medications include: Diltiazem.     He presents for follow up. Since last visit he wrote in with an increase in symptoms and severity in December. [place] was in [place] to his father who was passing. Reports at this time he was having the A Fib for 6-7 days in a row. When he takes the diltiazem, reports relief of symptoms in 1-2 hours. The longest episode was about 48 hours. With the A fib episodes he has chest discomfort, palpitations and dyspnea. Zio was completed on 1/8-1/22 which showed 3% AF burden, symptoms correlated with sinus, ectopy and AF. Reports he felt he had a lesser amount of A fib then normal when zio was on. He denies chest discomfort, peripheral edema, pre-syncope, or syncope. Presenting 12 lead ECG shows SR Vent Rate 59 bpm,  ms, QRS 84 ms, QTc 413 ms. Current cardiac medications include: Diltiazem.     I have reviewed and updated the patient's Past Medical History, Social History, Family History and Medication List.     Cardiographics  "(Personally Reviewed) :   10/22/21 Echo:   Interpretation Summary  Global and regional left ventricular function is normal with an EF of 60-65%.  Right ventricular function, chamber size, wall motion, and thickness are normal.  The inferior vena cava is normal.  No pericardial effusion is present.  There is no prior study for direct comparison.       Physical Examination   BP (!) 144/89 (BP Location: Right arm, Patient Position: Supine, Cuff Size: Adult Regular)   Pulse 61   Ht 1.905 m (6' 3\")   Wt 98.7 kg (217 lb 8 oz)   SpO2 100%   BMI 27.19 kg/m    Wt Readings from Last 3 Encounters:   02/15/23 98.7 kg (217 lb 8 oz)   02/16/22 97.4 kg (214 lb 12.8 oz)   01/03/22 93.4 kg (206 lb)     General Appearance:   Alert, well-appearing and in no acute distress.   HEENT: Atraumatic, normocephalic. MMM.   Chest/Lungs:   Respirations unlabored.  Lungs are clear to auscultation.   Cardiovascular:   Regular rate and rhythm.  S1/S2. No murmur.    Abdomen:  Soft, nontender, nondistended.   Extremities: No cyanosis or clubbing. No edema.    Musculoskeletal: Moves all extremities.  Gait normal.   Skin: Warm, dry, intact.    Neurologic: Mood and affect are appropriate.  Alert and oriented to person, place, time, and situation.            Medications  Allergies   Current Outpatient Medications   Medication Sig Dispense Refill     Ascorbic Acid (VITAMIN C PO)        diltiazem (CARDIZEM) 30 MG tablet Take 1 tablet (30 mg) by mouth 3 times daily as needed (with AF episode) 20 tablet 0     Omega-3 Fatty Acids (FISH OIL OMEGA-3 PO)        vitamin B-Complex Take 1 tablet by mouth daily       VITAMIN D PO        Zinc Acetate, Oral, (ZINC ACETATE PO)       No Known Allergies      Lab Results (Personally Reviewed)    Chemistry/lipid CBC Cardiac Enzymes/BNP/TSH/INR   Lab Results   Component Value Date    BUN 17 11/15/2021     11/15/2021    CO2 26 11/15/2021     Creatinine   Date Value Ref Range Status   11/15/2021 1.24 0.66 - 1.25 " mg/dL Final   06/11/2021 1.18 0.66 - 1.25 mg/dL Final       Lab Results   Component Value Date    CHOL 172 06/11/2021    HDL 66 06/11/2021    LDL 97 06/11/2021      Lab Results   Component Value Date    WBC 7.0 11/15/2021    HGB 15.3 11/15/2021    HCT 48.8 11/15/2021    MCV 80 11/15/2021     11/15/2021    Lab Results   Component Value Date    TSH 1.53 11/15/2021    INR 1.02 10/21/2021        The patient states understanding and is agreeable with the plan.   This patient was seen and examined with Dr. Chavarria The above note reflects our joint assessment and plan.   Antonieta Martínez PA-C  [facility]  Electrophysiology Consult Service  Pager: [phone]    EP STAFF NOTE  I have seen and examined the patient as part of a shared visit with PRINCE Kelly.  I agree with the note above. I reviewed today's vital signs and medications. I have reviewed and discussed with the advanced practice provider their physical examination, assessment, and plan   Briefly, PAF, increasing burden, slow progression, recording convey a slightly mor frequent AF compared to Zio  My campuzano history/exam findings are: RRR.   The key management decisions made by me: has a trip planned, will reconvene after with another monitor to see the progression of PAF, he is considering options, we discussed AAT versus ablation..  Total time spent on patient visit, reviewing notes, imaging, labs, orders, and completing necessary documentation: 45 minutes.  >50% of visit spent on counseling patient and/or coordination of care.     Amanuel Chavarria MD [facility]  Cardiology - Electrophysiology

## 2023-02-16 LAB
ATRIAL RATE - MUSE: 59 BPM
DIASTOLIC BLOOD PRESSURE - MUSE: NORMAL MMHG
INTERPRETATION ECG - MUSE: NORMAL
P AXIS - MUSE: 40 DEGREES
PR INTERVAL - MUSE: 168 MS
QRS DURATION - MUSE: 84 MS
QT - MUSE: 418 MS
QTC - MUSE: 413 MS
R AXIS - MUSE: -11 DEGREES
SYSTOLIC BLOOD PRESSURE - MUSE: NORMAL MMHG
T AXIS - MUSE: 17 DEGREES
VENTRICULAR RATE- MUSE: 59 BPM

## 2023-02-23 DIAGNOSIS — I48.0 PAF (PAROXYSMAL ATRIAL FIBRILLATION) (H): ICD-10-CM

## 2023-02-24 NOTE — TELEPHONE ENCOUNTER
ELIQUIS 5 MG TABLET  Last Written Prescription Date:  ?  Last Fill Quantity: ?,   # refills: ?  Last Office Visit :  2/15/2023  Future Office visit:   5/17/2023    Routing refill request to provider for review/approval because:  Drug not active on patient's medication list      Cici Hansen RN  Central Triage Red Flags/Med Refills

## 2023-03-05 ASSESSMENT — SLEEP AND FATIGUE QUESTIONNAIRES
HOW LIKELY ARE YOU TO NOD OFF OR FALL ASLEEP WHEN YOU ARE A PASSENGER IN A CAR FOR AN HOUR WITHOUT A BREAK: WOULD NEVER DOZE
HOW LIKELY ARE YOU TO NOD OFF OR FALL ASLEEP WHILE SITTING AND READING: WOULD NEVER DOZE
HOW LIKELY ARE YOU TO NOD OFF OR FALL ASLEEP WHILE LYING DOWN TO REST IN THE AFTERNOON WHEN CIRCUMSTANCES PERMIT: MODERATE CHANCE OF DOZING
HOW LIKELY ARE YOU TO NOD OFF OR FALL ASLEEP WHILE WATCHING TV: WOULD NEVER DOZE
HOW LIKELY ARE YOU TO NOD OFF OR FALL ASLEEP WHILE SITTING QUIETLY AFTER LUNCH WITHOUT ALCOHOL: WOULD NEVER DOZE
HOW LIKELY ARE YOU TO NOD OFF OR FALL ASLEEP WHILE SITTING INACTIVE IN A PUBLIC PLACE: WOULD NEVER DOZE
HOW LIKELY ARE YOU TO NOD OFF OR FALL ASLEEP IN A CAR, WHILE STOPPED FOR A FEW MINUTES IN TRAFFIC: WOULD NEVER DOZE
HOW LIKELY ARE YOU TO NOD OFF OR FALL ASLEEP WHILE SITTING AND TALKING TO SOMEONE: WOULD NEVER DOZE

## 2023-03-06 ENCOUNTER — THERAPY VISIT (OUTPATIENT)
Dept: SLEEP MEDICINE | Facility: CLINIC | Age: 48
End: 2023-03-06
Attending: INTERNAL MEDICINE
Payer: COMMERCIAL

## 2023-03-06 DIAGNOSIS — I48.0 PAF (PAROXYSMAL ATRIAL FIBRILLATION) (H): ICD-10-CM

## 2023-03-06 DIAGNOSIS — G47.8 NON-RESTORATIVE SLEEP: ICD-10-CM

## 2023-03-06 DIAGNOSIS — G47.59 OTHER PARASOMNIA: ICD-10-CM

## 2023-03-06 DIAGNOSIS — R06.83 SNORING: ICD-10-CM

## 2023-03-06 PROCEDURE — 95810 POLYSOM 6/> YRS 4/> PARAM: CPT | Performed by: INTERNAL MEDICINE

## 2023-03-07 NOTE — PROGRESS NOTES
Diagnostic PSG completed per provider order.  Patient did not meet criteria for PAP therapy.    Seizure montage applied per Dr. Rao's order.

## 2023-03-07 NOTE — PATIENT INSTRUCTIONS
[place] [facility]    1. Your sleep study will be reviewed by a sleep physician within the next few days.     2. Please follow up in the sleep clinic as scheduled, or, make an appointment with your sleep provider to be seen within two weeks to discuss the results of the sleep study.    3. If you have any questions or problems with your treatment plan, please contact your sleep clinic provider at [phone] to further manage your condition.    4. Please review your attached medication list, and, at your follow-up appointment advise your sleep clinic provider about any changes.    5. Go to [email or web address] for more information about your sleep problems.    Graeme Bishop, SAMIR, RPSGT  March 7, 2023

## 2023-03-08 LAB — SLPCOMP: NORMAL

## 2023-03-26 ASSESSMENT — SLEEP AND FATIGUE QUESTIONNAIRES
HOW LIKELY ARE YOU TO NOD OFF OR FALL ASLEEP WHILE SITTING AND TALKING TO SOMEONE: WOULD NEVER DOZE
HOW LIKELY ARE YOU TO NOD OFF OR FALL ASLEEP WHILE SITTING QUIETLY AFTER LUNCH WITHOUT ALCOHOL: WOULD NEVER DOZE
HOW LIKELY ARE YOU TO NOD OFF OR FALL ASLEEP WHILE SITTING INACTIVE IN A PUBLIC PLACE: WOULD NEVER DOZE
HOW LIKELY ARE YOU TO NOD OFF OR FALL ASLEEP IN A CAR, WHILE STOPPED FOR A FEW MINUTES IN TRAFFIC: WOULD NEVER DOZE
HOW LIKELY ARE YOU TO NOD OFF OR FALL ASLEEP WHILE WATCHING TV: WOULD NEVER DOZE
HOW LIKELY ARE YOU TO NOD OFF OR FALL ASLEEP WHEN YOU ARE A PASSENGER IN A CAR FOR AN HOUR WITHOUT A BREAK: WOULD NEVER DOZE
HOW LIKELY ARE YOU TO NOD OFF OR FALL ASLEEP WHILE LYING DOWN TO REST IN THE AFTERNOON WHEN CIRCUMSTANCES PERMIT: WOULD NEVER DOZE
HOW LIKELY ARE YOU TO NOD OFF OR FALL ASLEEP WHILE SITTING AND READING: WOULD NEVER DOZE

## 2023-03-30 ENCOUNTER — OFFICE VISIT (OUTPATIENT)
Dept: SLEEP MEDICINE | Facility: CLINIC | Age: 48
End: 2023-03-30
Payer: COMMERCIAL

## 2023-03-30 VITALS
BODY MASS INDEX: 27.64 KG/M2 | DIASTOLIC BLOOD PRESSURE: 85 MMHG | OXYGEN SATURATION: 98 % | WEIGHT: 221.13 LBS | SYSTOLIC BLOOD PRESSURE: 139 MMHG | HEART RATE: 63 BPM

## 2023-03-30 DIAGNOSIS — F41.9 ANXIETY: ICD-10-CM

## 2023-03-30 DIAGNOSIS — G47.61 PERIODIC LIMB MOVEMENT: ICD-10-CM

## 2023-03-30 DIAGNOSIS — R41.3 MEMORY DEFICIT: ICD-10-CM

## 2023-03-30 DIAGNOSIS — G47.33 OBSTRUCTIVE SLEEP APNEA: Primary | ICD-10-CM

## 2023-03-30 PROCEDURE — 99214 OFFICE O/P EST MOD 30 MIN: CPT | Performed by: INTERNAL MEDICINE

## 2023-03-30 NOTE — PATIENT INSTRUCTIONS
"     What is sleep apnea?     Sleep apnea is a condition that makes you stop breathing for short periods while you are asleep. There are 2 types of sleep apnea. One is called \"obstructive sleep apnea,\" and the other is called \"central sleep apnea.\"  In obstructive sleep apnea, you stop breathing because your throat narrows or closes (figure 1). In central sleep apnea, you stop breathing because your brain does not send the right signals to your muscles to make you breathe. When people talk about sleep apnea, they are usually referring to obstructive sleep apnea, which is what this article is about.  People with sleep apnea do not know that they stop breathing when they are asleep. But they do sometimes wake up startled or gasping for breath. They also often hear from loved ones that they snore.  What are the symptoms of sleep apnea? -- The main symptoms of sleep apnea are loud snoring, tiredness, and daytime sleepiness. Other symptoms can include:  ?Restless sleep  ?Waking up choking or gasping  ?Morning headaches, dry mouth, or sore throat  ?Waking up often to urinate  ?Waking up feeling unrested or groggy  ?Trouble thinking clearly or remembering things  Some people with sleep apnea don't have symptoms, or they don't know they have them. They might figure that it's normal to be tired or to snore a lot.  Should I see a doctor or nurse? -- Yes. If you think you might have sleep apnea, see your doctor.  Is there a test for sleep apnea? -- Yes. If your doctor or nurse suspects you have sleep apnea, he or she might send you for a \"sleep study.\" Sleep studies can sometimes be done at home, but they are usually done in a sleep lab. For the study, you spend the night in the lab, and you are hooked up to different machines that monitor your heart rate, breathing, and other body functions. The results of the test will tell your doctor or nurse if you have the disorder.  Is there anything I can do on my own to help my sleep " "apnea? -- Yes. Here are some things that might help:  ?Stay off your back when sleeping. (This is not always practical, because people cannot control their position while asleep. Plus, it only helps some people.)  ?Lose weight, if you are overweight  ?Avoid alcohol, because it can make sleep apnea worse  How is sleep apnea treated? -- The most effective treatment for sleep apnea is a device that keeps your airway open while you sleep. Treatment with this device is called \"continuous positive airway pressure,\" or CPAP. People getting CPAP wear a face mask at night that keeps them breathing (figure 2).  If your doctor or nurse recommends a CPAP machine, try to be patient about using it. The mask might seem uncomfortable to wear at first, and the machine might seem noisy, but using the machine can really pay off. People with sleep apnea who use a CPAP machine feel more rested and generally feel better.  There is also another device that you wear in your mouth called an \"oral appliance\" or \"mandibular advancement device.\" It also helps keep your airway open while you sleep.  In rare cases, when nothing else helps, doctors recommend surgery to keep the airway open. Surgery to do this is not always effective, and even when it is, the problem can come back.  Is sleep apnea dangerous? -- It can be. People with sleep apnea do not get good-quality sleep, so they are often tired and not alert. This puts them at risk for car accidents and other types of accidents. Plus, studies show that people with sleep apnea are more likely than others to have high blood pressure, heart attacks, and other serious heart problems. In people with severe sleep apnea, getting treated (for example, with a CPAP machine) can help prevent some of these problems.     GRAPHICS  Airway in a person with sleep apnea    Normally when a person sleeps, the airway remains open, and air can pass from the nose and mouth to the lungs. In a person with sleep " apnea, parts of the throat and mouth drop into the airway and block off the flow of air. This can cause loud snoring and interrupt breathing for short periods.  Graphic 46261 Version 5.0      Continuous positive airway pressure (CPAP) for sleep apnea    The CPAP mask gently blows air into your nose while you sleep. It puts just enough pressure on your airway to keep it from closing. The mask in this picture fits over just the nose. Other CPAP devices have masks that fit over the nose and mouth.

## 2023-03-30 NOTE — PROGRESS NOTES
Additional 10 minutes on the date of service was spent performing the following:    -Preparing to see the patient (eg, review of tests)   -Ordering medications, tests, or procedures   -Documenting clinical information in the electronic or other health record     Thank you for the opportunity to participate in the care of Angie Lopes.     He is a 48 year old  male patient who comes to the sleep medicine clinic for review of sleep study results. The study was completed on 03/06/2023 which showed that the patient had mild obstructive sleep apnea with an apnea-hypopnea index of 5.2 events per hour with the lowest O2 sat of 88%.  Respiratory events were worse in supine REM sleep.  The patient was also found to have periodic limb movement in sleep as well as REM sleep without atonia.  Since the patient's last clinical visit with me he reports that he has had other episodes of acting out in his sleep.  However that remains rare as well.    Assessment and Plan:  In summary Angie Lopes is a 48 year old year old male here for review of sleep study results.  1. Obstructive Sleep Apnea  We had an extensive conversation to review the results of his sleep study and to  him on the importance of treating sleep apnea. We discussed the options of treatment with oral appliance versus CPAP. Patient decided to proceed with CPAP. He will start using the device as soon as he receives it with the intention to use if for the entire night. We discussed some tips to increase PAP tolerance as well as the normal curve of adaptation. CPAP is going to provide improved respiratory function during the night but it can cause some sleep disruption that tends to improve with continuous usage. He should return to the clinic in 7 weeks to review compliance and efficacy monitoring. Since the patient does not have any preference, we will use [employer] as the durable medical equipment company.     2. Periodic limb movement/REM  sleep without atonia  We may need to investigate this further if the patient continues to have symptoms after optimal CPAP therapy.      Lab reviewed: Discussed with patient.    GEMA:  GEMA Total Score: 16  Total score - Huddy: 0 (3/26/2023  7:23 PM)    Patient Active Problem List   Diagnosis     CARDIOVASCULAR SCREENING; LDL GOAL LESS THAN 160     S/P vasectomy     External hemorrhoids       Current Outpatient Medications   Medication Sig Dispense Refill     Ascorbic Acid (VITAMIN C PO)        diltiazem (CARDIZEM) 30 MG tablet Take 1 tablet (30 mg) by mouth 3 times daily as needed (with AF episode) 20 tablet 0     Omega-3 Fatty Acids (FISH OIL OMEGA-3 PO)        vitamin B-Complex Take 1 tablet by mouth daily       VITAMIN D PO        Zinc Acetate, Oral, (ZINC ACETATE PO)          No Known Allergies    Physical Exam:  /85   Pulse 63   Wt 100.3 kg (221 lb 2 oz)   SpO2 98%   BMI 27.64 kg/m    BMI:Body mass index is 27.64 kg/m .   GEN: NAD,   Head: Normocephalic.  EYES:  EOMI  Psych: normal mood, normal affect     Labs/Studies:  - We reviewed the results of the overnight study as described on the HPI.     No results found for: PH, PHARTERIAL, PO2, [id number], SAT, PCO2, HCO3, BASEEXCESS, YOMAIRA, BEB  Lab Results   Component Value Date    TSH 1.53 11/15/2021    TSH 1.08 10/21/2021     Lab Results   Component Value Date    GLC 88 11/15/2021    GLC 98 10/21/2021     Lab Results   Component Value Date    HGB 15.3 11/15/2021    HGB 11.1 (L) 10/21/2021     Lab Results   Component Value Date    BUN 17 11/15/2021    BUN 15 10/21/2021    CR 1.24 11/15/2021    CR 1.29 (H) 10/21/2021     Lab Results   Component Value Date    AST 16 11/15/2021    AST 17 10/21/2021    ALT 33 11/15/2021    ALT 33 10/21/2021    ALKPHOS 77 11/15/2021    ALKPHOS 62 10/21/2021    BILITOTAL 0.6 11/15/2021    BILITOTAL 0.8 10/21/2021     No results found for: UAMP, UBARB, BENZODIAZEUR, UCANN, UCOC, OPIT, UPCP    Recent Labs   Lab Test  11/15/21  1552 10/21/21  1251    140   POTASSIUM 4.1 4.1   CHLORIDE 106 108   CO2 26 28   ANIONGAP 6 4   GLC 88 98   BUN 17 15   CR 1.24 1.29*   JAKE 9.5 9.2       Ferritin   Date Value Ref Range Status   11/15/2021 120 26 - 388 ng/mL Final         Patient verbalized understanding of these issues, agrees with the plan and all questions were answered today. Patient was given an opportuntity to voice any other symptoms or concerns not listed above. Patient did not have any other symptoms or concerns.      Irnieo Rao DO  Board Certified in Internal Medicine and Sleep Medicine      (Note created with Dragon voice recognition and unintended spelling errors and word substitutions may occur)

## 2023-03-30 NOTE — NURSING NOTE
"Chief Complaint   Patient presents with     Study Results       Initial /85   Pulse 63   Wt 100.3 kg (221 lb 2 oz)   SpO2 98%   BMI 27.64 kg/m   Estimated body mass index is 27.64 kg/m  as calculated from the following:    Height as of 2/15/23: 1.905 m (6' 3\").    Weight as of this encounter: 100.3 kg (221 lb 2 oz).    Medication Reconciliation: complete    FELICITA Herrera [facility] [place]    "

## 2023-04-25 ENCOUNTER — MYC REFILL (OUTPATIENT)
Dept: CARDIOLOGY | Facility: CLINIC | Age: 48
End: 2023-04-25
Payer: COMMERCIAL

## 2023-04-25 DIAGNOSIS — I48.0 PAF (PAROXYSMAL ATRIAL FIBRILLATION) (H): ICD-10-CM

## 2023-04-25 NOTE — TELEPHONE ENCOUNTER
Patient comment: I requested 20 pills in the past but perhaps it makes more sense to do a few more. Thanks!

## 2023-04-26 RX ORDER — DILTIAZEM HYDROCHLORIDE 30 MG/1
30 TABLET, FILM COATED ORAL 3 TIMES DAILY PRN
Qty: 30 TABLET | Refills: 0 | Status: SHIPPED | OUTPATIENT
Start: 2023-04-26 | End: 2023-04-28

## 2023-06-06 ENCOUNTER — ALLIED HEALTH/NURSE VISIT (OUTPATIENT)
Dept: CARDIOLOGY | Facility: CLINIC | Age: 48
End: 2023-06-06
Attending: INTERNAL MEDICINE
Payer: COMMERCIAL

## 2023-06-06 DIAGNOSIS — I48.0 PAF (PAROXYSMAL ATRIAL FIBRILLATION) (H): ICD-10-CM

## 2023-06-06 PROCEDURE — 93248 EXT ECG>7D<15D REV&INTERPJ: CPT | Performed by: INTERNAL MEDICINE

## 2023-07-05 ENCOUNTER — OFFICE VISIT (OUTPATIENT)
Dept: CARDIOLOGY | Facility: CLINIC | Age: 48
End: 2023-07-05
Attending: INTERNAL MEDICINE
Payer: COMMERCIAL

## 2023-07-05 VITALS
WEIGHT: 222.9 LBS | OXYGEN SATURATION: 98 % | BODY MASS INDEX: 27.86 KG/M2 | SYSTOLIC BLOOD PRESSURE: 125 MMHG | DIASTOLIC BLOOD PRESSURE: 75 MMHG | HEART RATE: 58 BPM

## 2023-07-05 DIAGNOSIS — I48.0 PAF (PAROXYSMAL ATRIAL FIBRILLATION) (H): ICD-10-CM

## 2023-07-05 PROCEDURE — 99215 OFFICE O/P EST HI 40 MIN: CPT | Performed by: INTERNAL MEDICINE

## 2023-07-05 PROCEDURE — 93010 ELECTROCARDIOGRAM REPORT: CPT | Performed by: INTERNAL MEDICINE

## 2023-07-05 PROCEDURE — 93005 ELECTROCARDIOGRAM TRACING: CPT

## 2023-07-05 PROCEDURE — G0463 HOSPITAL OUTPT CLINIC VISIT: HCPCS | Mod: 25 | Performed by: INTERNAL MEDICINE

## 2023-07-05 ASSESSMENT — PAIN SCALES - GENERAL: PAINLEVEL: NO PAIN (0)

## 2023-07-05 NOTE — NURSING NOTE
Chief Complaint   Patient presents with     Follow Up     Return EP- 4 mo follow-up paroxysmal atrial fibrillation. Started CPAP 4/2023. Review zio.       Vitals were taken, medications reconciled, and EKG was performed.    TORI Colbert  3:40 PM

## 2023-07-05 NOTE — PATIENT INSTRUCTIONS
You were seen in the Electrophysiology Clinic today by: Dr Chavarria    Plan:       Follow up Visit: 6 months with EP RAYMOND         If you have further questions, please utilize [employer] to contact us.     Your Care Team:    EP Cardiology   Telephone Number     Nurse Line  Ingris Reyes, RN   Flora Hamilton, RN   [phone]     For scheduling appointments:   Perla   [phone]   For procedure scheduling:    Macy Espinoza     [phone]   For the Device Clinic (Pacemakers, ICDs, Loop Recorders)    During business hours: [phone]  After business hours:   [phone]- select option 4 and ask for job code [id number].       On-call cardiologist for after hours or on weekends:   [phone], option #4, and ask to speak to the on-call cardiologist.     Cardiovascular Clinic:   [address] [place] [address]. [place] [address]      As always, Thank you for trusting us with your health care needs!

## 2023-07-05 NOTE — LETTER
2023      RE: Angie Lopes  [address]  [facility] [address]       Dear Colleague,    Thank you for the opportunity to participate in the care of your patient, Angie Lopes, at the [facility] [place] at [facility]. Please see a copy of my visit note below.        ELECTROPHYSIOLOGY CLINIC VISIT  Assessment/Recommendations   Assessment/Plan:    Mr. Lopes is a 48 year old male who has a past medical history significant for PAF (CHADSVASC 0) and GIB s/p polypectomy. He presents for follow up.     Paroxsymal Atrial Fibrillation:   We discussed in detail with the patient management/treatment options for A.fib includin. Stroke Prophylaxis:  CHADSVASC=  0, corresponding to a 0.2-0.5% annual stroke / systemic emolism event rate. indicating NO need for long term oral anticoagulation.   2. Rate Control: PRN diltiazem for palpitations  3. Rhythm Control: Cardioversion, Antiarrhythmics and/or ablation are options for rhythm control. He was scheduled for DCCV; however, he self converted and it was cancelled. A prior zio patch showed 3% AF burden and occasional ectopy. Pt does report he did not have as much A Fib when the monitor was on. He has "[employer] luana and has had a number of episodes. He just turned in a zio patch monitor. Discussed role of AAT and/or ablation. Discussed each option in detail including indications, risks, and benefits in detail. He is going to think about this and will let us know how he wishes to proceed.     4. Risk Factor Management: BP control, and continue MERA evaluation.  Patient will be seen by sleep medicine in April.       Follow up in 6 months with EP LUANA tentatively.       History of Present Illness/Subjective    Mr. Angie Lopes is a 48 year old male who comes in today for EP consultation of PAF.    Mr. Lopes is a 48 year old male who has a past medical history significant for PAF (CHADSVASC 0) and  GIB s/p polypectomy.     He reports having symptoms of palpitations for last 5 years. He states he would have intermittent palpitations lasting up to 30 minutes and then self resolving. Then in 8/2021, he started having increasing frequency of symptoms. He reports in 6/2021 he had GIB and had polyp removed. Of note, his Hgb is slowly downtredning again. He then presented to [facility] on 10/21/21 with a 5 day history of palpitation and orthostatic dizziness. He was found to be in AF. He was started on Eliquis and Diltiazem and arranged for close EP clinic visit.     EP Visit 10/22/21: He presents today to establish care.  Patient denies any significant drug use or alcoholism.  Patient appears to be in excellent health but denies any special  diets. He reports feeling that he is out of AF now.  He reports feeling interrupted sleep, never had a sleep evaluation. He denies any melena or bright blood per rectum. He denies chest discomfort, palpitations, abdominal fullness/bloating or peripheral edema, shortness of breath, paroxysmal nocturnal dyspnea, orthopnea, lightheadedness, dizziness, pre-syncope, or syncope. Echo today shows normal structure and function and does show he is now in SR. Current cardiac medications include: Eliquis and Diltiazem.     EP Visit: 2/16/22: He presents today for follow up. He recently saw sleep medicine who plans for sleep study. He reports feeling OK. He stopped caffeine at end of 12/2021. He does continue to have palpitations lasting up to several hours at a time. He denies chest discomfort,  abdominal fullness/bloating or peripheral edema, shortness of breath, paroxysmal nocturnal dyspnea, orthopnea, lightheadedness, dizziness, pre-syncope, or syncope. A zio patch monitor from 1/4/22-1/17/22 showed 6% PAF burden up to 18 hours 41 minutes and 60 NSVT that are likely AF with aberrant conduction. 10 symptom activations 2 showed AF and 8 showed sinus. Presenting 12 lead ECG shows SB  Vent Rate 56 bpm,  ms, QRS 88 ms, QTc 409 ms. Current cardiac medications include: Diltiazem.     EP Visit 2/15/23: He presents for follow up. Since last visit he wrote in with an increase in symptoms and severity in December. He was in [place] tending to his father who was passing. Reports at this time he was having the A Fib for 6-7 days in a row. When he takes the diltiazem, reports relief of symptoms in 1-2 hours. The longest episode was about 48 hours. With the A fib episodes he has chest discomfort, palpitations and dyspnea. Zio was completed on 1/8-1/22 which showed 3% AF burden, symptoms correlated with sinus, ectopy and AF. Reports he felt he had a lesser amount of A fib then normal when zio was on. He denies chest discomfort, peripheral edema, pre-syncope, or syncope. Presenting 12 lead ECG shows SR Vent Rate 59 bpm,  ms, QRS 84 ms, QTc 413 ms. Current cardiac medications include: Diltiazem.     He presents today for follow up. He stated CPAP in 4/2023.  A zio patch from 6/2023 reports in progress still. He reports feeling intermittent AF episodes reported on Kardia 2/15-2/18, 3/9-3/11, 3/21, 3/28-3/30, 4/7, 4/15, 6/8, 6/14, 6/17, 6/19-6/22. He denies chest discomfort, palpitations, abdominal fullness/bloating or peripheral edema, shortness of breath, paroxysmal nocturnal dyspnea, orthopnea, lightheadedness, dizziness, pre-syncope, or syncope. Presenting 12 lead ECG shows NSR Vent Rate 61 bpm,  ms, QRS 88 ms, QTc 412 ms. Current cardiac medications include: Diltiazem.     I have reviewed and updated the patient's Past Medical History, Social History, Family History and Medication List.     Cardiographics (Personally Reviewed) :   10/22/21 Echo:   Interpretation Summary  Global and regional left ventricular function is normal with an EF of 60-65%.  Right ventricular function, chamber size, wall motion, and thickness are normal.  The inferior vena cava is normal.  No pericardial effusion  is present.  There is no prior study for direct comparison.       Physical Examination   /75 (BP Location: Right arm, Patient Position: Supine, Cuff Size: Adult Large)   Pulse 58   Wt 101.1 kg (222 lb 14.4 oz)   SpO2 98%   BMI 27.86 kg/m    Wt Readings from Last 3 Encounters:   03/30/23 100.3 kg (221 lb 2 oz)   02/15/23 98.7 kg (217 lb 8 oz)   02/16/22 97.4 kg (214 lb 12.8 oz)     General Appearance:   Alert, well-appearing and in no acute distress.   HEENT: Atraumatic, normocephalic. MMM.   Chest/Lungs:   Respirations unlabored.  Lungs are clear to auscultation.   Cardiovascular:   Regular rate and rhythm.  S1/S2. No murmur.    Abdomen:  Soft, nontender, nondistended.   Extremities: No cyanosis or clubbing. No edema.    Musculoskeletal: Moves all extremities.  Gait normal.   Skin: Warm, dry, intact.    Neurologic: Mood and affect are appropriate.  Alert and oriented to person, place, time, and situation.            Medications  Allergies   Current Outpatient Medications   Medication Sig Dispense Refill    Ascorbic Acid (VITAMIN C PO)       diltiazem (CARDIZEM) 30 MG tablet Take 1 tablet (30 mg) by mouth 3 times daily as needed (with AF episode) 50 tablet 0    Omega-3 Fatty Acids (FISH OIL OMEGA-3 PO)       vitamin B-Complex Take 1 tablet by mouth daily      VITAMIN D PO       Zinc Acetate, Oral, (ZINC ACETATE PO)       No Known Allergies      Lab Results (Personally Reviewed)    Chemistry/lipid CBC Cardiac Enzymes/BNP/TSH/INR   Lab Results   Component Value Date    BUN 17 11/15/2021     11/15/2021    CO2 26 11/15/2021     Creatinine   Date Value Ref Range Status   11/15/2021 1.24 0.66 - 1.25 mg/dL Final   06/11/2021 1.18 0.66 - 1.25 mg/dL Final       Lab Results   Component Value Date    CHOL 172 06/11/2021    HDL 66 06/11/2021    LDL 97 06/11/2021      Lab Results   Component Value Date    WBC 7.0 11/15/2021    HGB 15.3 11/15/2021    HCT 48.8 11/15/2021    MCV 80 11/15/2021     11/15/2021     Lab Results   Component Value Date    TSH 1.53 11/15/2021    INR 1.02 10/21/2021        The patient states understanding and is agreeable with the plan.   Amanuel Chavarria MD [facility]  Cardiology - Electrophysiology    Total time spent on patient visit, reviewing notes, imaging, labs, orders, and completing necessary documentation: 45 minutes.  >50% of visit spent on counseling patient and/or coordination of care.                    Please do not hesitate to contact me if you have any questions/concerns.     Sincerely,     Amanuel Chavarria MD

## 2023-07-05 NOTE — PROGRESS NOTES
ELECTROPHYSIOLOGY CLINIC VISIT  Assessment/Recommendations   Assessment/Plan:    Mr. Lopes is a 48 year old male who has a past medical history significant for PAF (CHADSVASC 0) and GIB s/p polypectomy. He presents for follow up.     Paroxsymal Atrial Fibrillation:   We discussed in detail with the patient management/treatment options for A.fib includin. Stroke Prophylaxis:  CHADSVASC=  0, corresponding to a 0.2-0.5% annual stroke / systemic emolism event rate. indicating NO need for long term oral anticoagulation.   2. Rate Control: PRN diltiazem for palpitations  3. Rhythm Control: Cardioversion, Antiarrhythmics and/or ablation are options for rhythm control. He was scheduled for DCCV; however, he self converted and it was cancelled. A prior zio patch showed 3% AF burden and occasional ectopy. Pt does report he did not have as much A Fib when the monitor was on. He has InhibOx luana and has had a number of episodes. He just turned in a zio patch monitor. Discussed role of AAT and/or ablation. Discussed each option in detail including indications, risks, and benefits in detail. He is going to think about this and will let us know how he wishes to proceed.     4. Risk Factor Management: BP control, and continue MERA evaluation.  Patient will be seen by sleep medicine in April.       Follow up in 6 months with EP LUANA tentatively.       History of Present Illness/Subjective    Mr. Angie Lopes is a 48 year old male who comes in today for EP consultation of PAF.    Mr. Lopes is a 48 year old male who has a past medical history significant for PAF (CHADSVASC 0) and GIB s/p polypectomy.     He reports having symptoms of palpitations for last 5 years. He states he would have intermittent palpitations lasting up to 30 minutes and then self resolving. Then in 2021, he started having increasing frequency of symptoms. He reports in 2021 he had GIB and had polyp removed. Of note, his Hgb is slowly  downtredning again. He then presented to [facility] on 10/21/21 with a 5 day history of palpitation and orthostatic dizziness. He was found to be in AF. He was started on Eliquis and Diltiazem and arranged for close EP clinic visit.     EP Visit 10/22/21: He presents today to establish care.  Patient denies any significant drug use or alcoholism.  Patient appears to be in excellent health but denies any special  diets. He reports feeling that he is out of AF now.  He reports feeling interrupted sleep, never had a sleep evaluation. He denies any melena or bright blood per rectum. He denies chest discomfort, palpitations, abdominal fullness/bloating or peripheral edema, shortness of breath, paroxysmal nocturnal dyspnea, orthopnea, lightheadedness, dizziness, pre-syncope, or syncope. Echo today shows normal structure and function and does show he is now in SR. Current cardiac medications include: Eliquis and Diltiazem.     EP Visit: 2/16/22: He presents today for follow up. He recently saw sleep medicine who plans for sleep study. He reports feeling OK. He stopped caffeine at end of 12/2021. He does continue to have palpitations lasting up to several hours at a time. He denies chest discomfort,  abdominal fullness/bloating or peripheral edema, shortness of breath, paroxysmal nocturnal dyspnea, orthopnea, lightheadedness, dizziness, pre-syncope, or syncope. A zio patch monitor from 1/4/22-1/17/22 showed 6% PAF burden up to 18 hours 41 minutes and 60 NSVT that are likely AF with aberrant conduction. 10 symptom activations 2 showed AF and 8 showed sinus. Presenting 12 lead ECG shows SB Vent Rate 56 bpm,  ms, QRS 88 ms, QTc 409 ms. Current cardiac medications include: Diltiazem.     EP Visit 2/15/23: He presents for follow up. Since last visit he wrote in with an increase in symptoms and severity in December. He was in [place] tending to his father who was passing. Reports at this time he was having the A Fib  for 6-7 days in a row. When he takes the diltiazem, reports relief of symptoms in 1-2 hours. The longest episode was about 48 hours. With the A fib episodes he has chest discomfort, palpitations and dyspnea. Zio was completed on 1/8-1/22 which showed 3% AF burden, symptoms correlated with sinus, ectopy and AF. Reports he felt he had a lesser amount of A fib then normal when zio was on. He denies chest discomfort, peripheral edema, pre-syncope, or syncope. Presenting 12 lead ECG shows SR Vent Rate 59 bpm,  ms, QRS 84 ms, QTc 413 ms. Current cardiac medications include: Diltiazem.     He presents today for follow up. He stated CPAP in 4/2023.  A zio patch from 6/2023 reports in progress still. He reports feeling intermittent AF episodes reported on Kardia 2/15-2/18, 3/9-3/11, 3/21, 3/28-3/30, 4/7, 4/15, 6/8, 6/14, 6/17, 6/19-6/22. He denies chest discomfort, palpitations, abdominal fullness/bloating or peripheral edema, shortness of breath, paroxysmal nocturnal dyspnea, orthopnea, lightheadedness, dizziness, pre-syncope, or syncope. Presenting 12 lead ECG shows NSR Vent Rate 61 bpm,  ms, QRS 88 ms, QTc 412 ms. Current cardiac medications include: Diltiazem.     I have reviewed and updated the patient's Past Medical History, Social History, Family History and Medication List.     Cardiographics (Personally Reviewed) :   10/22/21 Echo:   Interpretation Summary  Global and regional left ventricular function is normal with an EF of 60-65%.  Right ventricular function, chamber size, wall motion, and thickness are normal.  The inferior vena cava is normal.  No pericardial effusion is present.  There is no prior study for direct comparison.       Physical Examination   /75 (BP Location: Right arm, Patient Position: Supine, Cuff Size: Adult Large)   Pulse 58   Wt 101.1 kg (222 lb 14.4 oz)   SpO2 98%   BMI 27.86 kg/m    Wt Readings from Last 3 Encounters:   03/30/23 100.3 kg (221 lb 2 oz)   02/15/23 98.7  kg (217 lb 8 oz)   02/16/22 97.4 kg (214 lb 12.8 oz)     General Appearance:   Alert, well-appearing and in no acute distress.   HEENT: Atraumatic, normocephalic. MMM.   Chest/Lungs:   Respirations unlabored.  Lungs are clear to auscultation.   Cardiovascular:   Regular rate and rhythm.  S1/S2. No murmur.    Abdomen:  Soft, nontender, nondistended.   Extremities: No cyanosis or clubbing. No edema.    Musculoskeletal: Moves all extremities.  Gait normal.   Skin: Warm, dry, intact.    Neurologic: Mood and affect are appropriate.  Alert and oriented to person, place, time, and situation.            Medications  Allergies   Current Outpatient Medications   Medication Sig Dispense Refill     Ascorbic Acid (VITAMIN C PO)        diltiazem (CARDIZEM) 30 MG tablet Take 1 tablet (30 mg) by mouth 3 times daily as needed (with AF episode) 50 tablet 0     Omega-3 Fatty Acids (FISH OIL OMEGA-3 PO)        vitamin B-Complex Take 1 tablet by mouth daily       VITAMIN D PO        Zinc Acetate, Oral, (ZINC ACETATE PO)       No Known Allergies      Lab Results (Personally Reviewed)    Chemistry/lipid CBC Cardiac Enzymes/BNP/TSH/INR   Lab Results   Component Value Date    BUN 17 11/15/2021     11/15/2021    CO2 26 11/15/2021     Creatinine   Date Value Ref Range Status   11/15/2021 1.24 0.66 - 1.25 mg/dL Final   06/11/2021 1.18 0.66 - 1.25 mg/dL Final       Lab Results   Component Value Date    CHOL 172 06/11/2021    HDL 66 06/11/2021    LDL 97 06/11/2021      Lab Results   Component Value Date    WBC 7.0 11/15/2021    HGB 15.3 11/15/2021    HCT 48.8 11/15/2021    MCV 80 11/15/2021     11/15/2021    Lab Results   Component Value Date    TSH 1.53 11/15/2021    INR 1.02 10/21/2021        The patient states understanding and is agreeable with the plan.   Amanuel Chavarria MD [facility]  Cardiology - Electrophysiology    Total time spent on patient visit, reviewing notes, imaging, labs, orders, and completing necessary  documentation: 45 minutes.  >50% of visit spent on counseling patient and/or coordination of care.

## 2023-07-07 LAB
ATRIAL RATE - MUSE: 61 BPM
DIASTOLIC BLOOD PRESSURE - MUSE: NORMAL MMHG
INTERPRETATION ECG - MUSE: NORMAL
P AXIS - MUSE: 54 DEGREES
PR INTERVAL - MUSE: 174 MS
QRS DURATION - MUSE: 88 MS
QT - MUSE: 410 MS
QTC - MUSE: 412 MS
R AXIS - MUSE: -11 DEGREES
SYSTOLIC BLOOD PRESSURE - MUSE: NORMAL MMHG
T AXIS - MUSE: 32 DEGREES
VENTRICULAR RATE- MUSE: 61 BPM

## 2023-09-03 ENCOUNTER — TELEPHONE (OUTPATIENT)
Dept: CARDIOLOGY | Facility: CLINIC | Age: 48
End: 2023-09-03
Payer: COMMERCIAL

## 2023-09-03 NOTE — TELEPHONE ENCOUNTER
Telephone Contact    Received a page requesting a call to Mr Angie Lopes regarding atrial fibrillation.     Mr Lopes reports that he has been in atrial fibrillation x7 days, he is able to sense the abnormal rhythm and confirms with his home cardia monitor. This is the longest continuous run of atrial fibrillation that he has experienced. He has been taking his diltiazem for the last 5 days with no change in rhythm. He has mild symptoms at this time consisting of palpitations, fatigue, and very mild exertional intolerance. He would like to explore an elective cardioversion.     CHADSVASC = 0, he does not take anticoagulation regularly but started his Apixaban on 09/01 in the setting of this prolonged episode. His average heart rate is estimated 80s-90s at this time.     After discussing management options with the patient, he would like to explore elective cardioversion this week. He would also like to explore ablation in the future.   He will keep taking his Diltiazem and Apixaban and await contact from the EP team after the holiday weekend.   If symptoms worsen or he has any change in clinical status in the interim, he will report to the ED.     LAVELL sent to primary electrophysiologist, Dr Chavarria.    Miguel A Abdullahi MD [facility], PGY-5  Fellow, Cardiovascular Disease

## 2023-09-05 ENCOUNTER — MYC MEDICAL ADVICE (OUTPATIENT)
Dept: CARDIOLOGY | Facility: CLINIC | Age: 48
End: 2023-09-05
Payer: COMMERCIAL

## 2023-09-05 DIAGNOSIS — I48.0 PAF (PAROXYSMAL ATRIAL FIBRILLATION) (H): Primary | ICD-10-CM

## 2023-09-05 DIAGNOSIS — I48.0 PAF (PAROXYSMAL ATRIAL FIBRILLATION) (H): ICD-10-CM

## 2023-09-05 RX ORDER — LIDOCAINE 40 MG/G
CREAM TOPICAL
Status: CANCELLED | OUTPATIENT
Start: 2023-09-05

## 2023-09-05 RX ORDER — POTASSIUM CHLORIDE 1500 MG/1
20 TABLET, EXTENDED RELEASE ORAL
Status: CANCELLED | OUTPATIENT
Start: 2023-09-05

## 2023-09-05 RX ORDER — MAGNESIUM SULFATE HEPTAHYDRATE 40 MG/ML
2 INJECTION, SOLUTION INTRAVENOUS
Status: CANCELLED | OUTPATIENT
Start: 2023-09-05

## 2023-09-05 RX ORDER — POTASSIUM CHLORIDE 1500 MG/1
40 TABLET, EXTENDED RELEASE ORAL
Status: CANCELLED | OUTPATIENT
Start: 2023-09-05

## 2023-09-05 NOTE — LETTER
September 7, 2023      TO: Angie Lopes  [address]  [facility] [address]         Dear Angie,    You are scheduled for an Atrial Fibrillation Ablation, at [facility]. The hospital is located at [address] [place] [address] on the [place] of the campus.  If you need to cancel this procedure, please call [phone].     **Start Eliquis 5mg twice a day 1 week prior to ablation. Continue until follow up.**    Pre-Admission Phone Call will occur 1-2 days prior to procedure date.  You do not need to come to the [place].    Visitor Policy: Two visitors.    Date: _December 20, 2023______  Time: _10:45 am___Arrive to the [facility] Waiting Room at the [facility]  Cardiac MRI/MRA  1. You will be required to lay flat and follow breath-hold instructions.   2. You will need to remove all metal and answer a safety questionnaire.       Date:  December 21, 2023  Time: ___5:30 am_____Arrive to [address] at the [facility]  Atrial Fibrillation Ablation     1. Please review the attached instructions on showering before your procedure at the end of this letter.  2. Your history and physical will be completed by our advanced practice provider when you arrive.  3. Please do not eat anything for 8 hours prior to your procedure. You may have sips of water up until 2 hours prior to your arrival.  4. Medications to continue:  - Anticoagulant (Eliquis), please take your morning dose before you come.   - Take all meds as prescribed, except for those noted below.  5. Medications to hold:    - Hold diltiazem (if taking) morning of.  6. If the imaging shows a clot in your heart, the procedure will be canceled.   7. You will receive general anesthesia for this procedure.   8. You will likely discharge the same day and need a .      Post-Procedure Instructions  Care of groin site:   Remove the Band-Aid after 24 hours. If there is minor oozing, apply another Band-aid and remove it after 12  hours.    Do NOT take a bath, use a hot tub, pool, or submerse in water for at least 3 days. You may shower.    It is normal to have a small bruise or lump at the site.   Do not scrub the site.   Do not use lotion or powder near the puncture site for 3 days.    If you start bleeding from the site in your groin: Lie down flat and press firmly on the site. Call your physician immediately, or, come to the emergency room.  Call 911 right away if you have bleeding that is heavy or does not stop.    Call your doctor/provider if:    You have a large or growing hard lump around the site.    The site is red, swollen, hot or tender.    You have chills or a fever greater than 101 F (38 C).    Blood or fluid is draining from the site.    Your leg or arm turns bluish, feels numb or cool.    You have hives, a rash or unusual itching.      Activity Restrictions   For the first 2 days: Do not stoop or squat. When you cough, sneeze or move your bowels, hold your hand over the puncture site and press gently.   Do not lift more than 10 pounds or exertional activity for 10 days.  - No driving for 24 hours after (with or without general anesthesia).       Date: __March 22, 2024__1:45pm______:   Follow up appointment with Belen Santos NP      Please do not hesitate to utilize [facility] or call us if you have any questions or concerns.    Flora Hamilton RN  Electrophysiology Nurse Coordinator  [phone]    [facility]   [phone]            Showering Before Surgery   Your surgeon has asked you to take 2 showers before surgery.  Why is this important?  It is normal for bacteria (germs) to be on your skin. The skin protects us from these germs. When you have surgery, we cut the skin. Sometimes germs get into the cuts and cause infection (illness caused by germs). By following the instructions below and using special soap, you will lower the number of germs on your skin. This decreases your chance of  infection.  Special soap  Buy or get 8 ounces of antiseptic surgical soap called 4% CHG. Common name brands of this soap are Hibiclens and Exidine.   You can find it at your local pharmacy, clinic or retail store. If you have trouble, ask your pharmacist to help you find the right substitute.   A note about shaving:  Do not shave within 12 inches of your incision (surgical cut) area for at least 3 days before surgery. Shaving can make small cuts in the skin. This puts you at a higher risk of infection.  Items you will need for each shower:   1 newly washed towel   4 ounces of one of the above soaps  Follow these instructions:  The evening before surgery   1. Wash your hair and body with your regular shampoo and soap. Make sure you rinse the shampoo and soap from your hair and body.   2. Using clean hands, apply about 2 ounces of soap gently on your skin from the neck to your toes. Use on your groin area last. Do not use this soap on your face or head. If you get any soap in your eyes, ears or mouth, rinse right away.   3. Repeat step 2. It is very important to let the soap stay on your skin for at least 1 minute.   4. Rinse well and dry off using a clean towel.If you feel any tingling, itching or other irritation, rinse right away. It is normal to feel some coolness on the skin after using the antiseptic soap. Your skin may feel a bit dry after the shower, but do not use any lotions, creams or moisturizers. Do not use hair spray or other products in your hair.  5. Dress in freshly washed clothes or pajamas. Use fresh pillowcases and sheets on your bed.      The morning of surgery  1. Wash your hair and body with your regular shampoo and soap. Make sure you rinse the shampoo and soap from your hair and body.   2. Using clean hands, apply about 2 ounces of soap gently on your skin from the neck to your toes. Use on your groin area last. Do not use this soap on your face or head. If you get any soap in your eyes, ears  or mouth, rinse right away.   3. Repeat step 2. It is very important to let the soap stay on your skin for at least 1 minute.   4. Rinse well and dry off using a clean towel.If you feel any tingling, itching or other irritation, rinse right away. It is normal to feel some coolness on the skin after using the antiseptic soap. Your skin may feel a bit dry after the shower, but do not use any lotions, creams or moisturizers. Do not use hair spray or other products in your hair.  5. Dress in clean clothes.  If you have any questions about showering or an allergy to CHG soap, please call the Preadmissions Nursing Department at the hospital where you are having your surgery.  [facility], [place] ([place]): [phone]  This phone number will be answered between the hours of 8:00 a.m. and 6:30 p.m. Monday through Friday.

## 2023-09-05 NOTE — PROGRESS NOTES
EP  scheduled the HERBERT/Cardioversion for 9/8. The patient is asking to get orders for an ablation. His follow up isn't until Nov 15 because this is the earliest spot Dr. Chavarria has available. Patient doesn't want to wait the additional 6-8 weeks after the Nov appointment for a procedure date, he's hoping for a Dec procedure.     Request sent to KHARI Bella  Periop Electrophysiology   [phone]

## 2023-09-05 NOTE — PROGRESS NOTES
HERBERT/DCCV ordered. EP  notified.      FW: Patient requesting elective DCCV for AF  Received: 2 days ago  Belen Magana, Christine Mari CNP; Flora Hamilton, RN  Please arrange DCCV for pt as below and then clinic apt with Aura following  Thanks!  LVW          Previous Messages       ----- Message -----  From: Miguel A Abdullahi MD  Sent: 9/3/2023   3:43 PM CDT  To: Amanuel Chavarria MD; *  Subject: Patient requesting elective DCCV for AF          Good afternoon,    I spoke with Mr Lopes today. He has been in atrial fibrillation since 08/28. He has been taking his diltiazem since 08/30 and Apixaban since 09/01. His rates are well controlled but symptoms are bothersome.    He would like to explore elective cardioversion sometime this week if possible. He would also like to discuss a possible ablation in the future. Are you able to help get this coordinated? He will present to the ED if symptoms worsen in the interim.    Thank you,  Miguel A

## 2023-09-06 RX ORDER — DILTIAZEM HYDROCHLORIDE 30 MG/1
30 TABLET, FILM COATED ORAL 3 TIMES DAILY PRN
Qty: 50 TABLET | Refills: 0 | Status: SHIPPED | OUTPATIENT
Start: 2023-09-06

## 2023-09-06 NOTE — TELEPHONE ENCOUNTER
Left voicemail for patient. Will send MyC message as well.     Spoke to patient.  He reports this is his 10th day in AF - started 8/27. He has had AF 3 times before that lasted for 5 days and broke on it's own. HRs 80s-90s and taking dilt 30mg BID & Eliquis 5mg BID since 9/1/23. Symptoms: slightly MIDDLE[place], mild chest discomfort/soreness, anxiety possibly due to watch telling him he is in AF.     He has several questions regarding the management of AF, DCCV, etc. Writer reviewed with him AF management that includes HR control, AC if needed, symptom management. Reassured patient that AF in itself is not inherently dangerous. Reviewed cardioversion recommendation and answered questions.     He is curious how urgent it is to get a DCCV when in AF. Writer discussed that it is fine to remain in AF while patient awaits DCCV. If HR needs better control, we would add or increase meds to assist with that while awaiting DCCV. He also asked what happens if the DCCV doesn't work. Writer answered that RAYMOND will be in discussions with Dr Chavarria for next steps.     Patient relayed desire for AF ablation and would like to tentatively schedule for Dec/Maksim and he will discuss in detail with Dr Chavarria at his visit on 11/15. Reviewed with Belen Santos NP. Will place orders and route to EP .     Belen Santos, BRENT CNP  You11 hours ago (9:25 PM)     LV  Hello,  Yes he would be candidate for ablation and this would be a reasonable next step for him.  Thanks,  LVW     Sent in refills of diltiazem and Eliquis per patient request.

## 2023-09-07 DIAGNOSIS — I48.0 PAF (PAROXYSMAL ATRIAL FIBRILLATION) (H): Primary | ICD-10-CM

## 2023-09-07 RX ORDER — LIDOCAINE 40 MG/G
CREAM TOPICAL
Status: CANCELLED | OUTPATIENT
Start: 2023-09-07

## 2023-09-07 RX ORDER — SODIUM CHLORIDE 9 MG/ML
INJECTION, SOLUTION INTRAVENOUS CONTINUOUS
Status: CANCELLED | OUTPATIENT
Start: 2023-09-07

## 2023-09-08 ENCOUNTER — HOSPITAL ENCOUNTER (OUTPATIENT)
Dept: CARDIOLOGY | Facility: CLINIC | Age: 48
Discharge: HOME | End: 2023-09-08
Attending: NURSE PRACTITIONER
Payer: COMMERCIAL

## 2023-09-08 ENCOUNTER — APPOINTMENT (OUTPATIENT)
Dept: MEDSURG UNIT | Facility: CLINIC | Age: 48
End: 2023-09-08
Attending: INTERNAL MEDICINE
Payer: COMMERCIAL

## 2023-09-08 ENCOUNTER — ANESTHESIA EVENT (OUTPATIENT)
Dept: SURGERY | Facility: CLINIC | Age: 48
End: 2023-09-08
Payer: COMMERCIAL

## 2023-09-08 ENCOUNTER — APPOINTMENT (OUTPATIENT)
Dept: LAB | Facility: CLINIC | Age: 48
End: 2023-09-08
Attending: INTERNAL MEDICINE
Payer: COMMERCIAL

## 2023-09-08 ENCOUNTER — ANESTHESIA (OUTPATIENT)
Dept: SURGERY | Facility: CLINIC | Age: 48
End: 2023-09-08
Payer: COMMERCIAL

## 2023-09-08 ENCOUNTER — HOSPITAL ENCOUNTER (OUTPATIENT)
Facility: CLINIC | Age: 48
Discharge: HOME | End: 2023-09-08
Attending: INTERNAL MEDICINE
Payer: COMMERCIAL

## 2023-09-08 VITALS
OXYGEN SATURATION: 100 % | RESPIRATION RATE: 14 BRPM | DIASTOLIC BLOOD PRESSURE: 76 MMHG | HEART RATE: 51 BPM | SYSTOLIC BLOOD PRESSURE: 108 MMHG

## 2023-09-08 VITALS
HEART RATE: 73 BPM | RESPIRATION RATE: 10 BRPM | DIASTOLIC BLOOD PRESSURE: 76 MMHG | SYSTOLIC BLOOD PRESSURE: 106 MMHG | OXYGEN SATURATION: 97 %

## 2023-09-08 DIAGNOSIS — I48.0 PAF (PAROXYSMAL ATRIAL FIBRILLATION) (H): ICD-10-CM

## 2023-09-08 LAB
ANION GAP SERPL CALCULATED.3IONS-SCNC: 10 MMOL/L (ref 7–15)
BUN SERPL-MCNC: 14.6 MG/DL (ref 6–20)
CALCIUM SERPL-MCNC: 9.6 MG/DL (ref 8.6–10)
CHLORIDE SERPL-SCNC: 106 MMOL/L (ref 98–107)
CREAT SERPL-MCNC: 1.44 MG/DL (ref 0.67–1.17)
DEPRECATED HCO3 PLAS-SCNC: 25 MMOL/L (ref 22–29)
EGFRCR SERPLBLD CKD-EPI 2021: 60 ML/MIN/1.73M2
ERYTHROCYTE [DISTWIDTH] IN BLOOD BY AUTOMATED COUNT: 14.4 % (ref 10–15)
GLUCOSE SERPL-MCNC: 97 MG/DL (ref 70–99)
HCT VFR BLD AUTO: 47.3 % (ref 40–53)
HGB BLD-MCNC: 15.1 G/DL (ref 13.3–17.7)
LVEF ECHO: NORMAL
MAGNESIUM SERPL-MCNC: 2.1 MG/DL (ref 1.7–2.3)
MCH RBC QN AUTO: 25.7 PG (ref 26.5–33)
MCHC RBC AUTO-ENTMCNC: 31.9 G/DL (ref 31.5–36.5)
MCV RBC AUTO: 80 FL (ref 78–100)
PLATELET # BLD AUTO: 220 10E3/UL (ref 150–450)
POTASSIUM SERPL-SCNC: 4.1 MMOL/L (ref 3.4–5.3)
RBC # BLD AUTO: 5.88 10E6/UL (ref 4.4–5.9)
SODIUM SERPL-SCNC: 141 MMOL/L (ref 136–145)
WBC # BLD AUTO: 5.7 10E3/UL (ref 4–11)

## 2023-09-08 PROCEDURE — 999N000054 HC STATISTIC EKG NON-CHARGEABLE

## 2023-09-08 PROCEDURE — 80048 BASIC METABOLIC PNL TOTAL CA: CPT | Performed by: INTERNAL MEDICINE

## 2023-09-08 PROCEDURE — 93010 ELECTROCARDIOGRAM REPORT: CPT | Mod: 76 | Performed by: INTERNAL MEDICINE

## 2023-09-08 PROCEDURE — 93325 DOPPLER ECHO COLOR FLOW MAPG: CPT | Mod: 26 | Performed by: INTERNAL MEDICINE

## 2023-09-08 PROCEDURE — 92960 CARDIOVERSION ELECTRIC EXT: CPT

## 2023-09-08 PROCEDURE — 93010 ELECTROCARDIOGRAM REPORT: CPT | Performed by: INTERNAL MEDICINE

## 2023-09-08 PROCEDURE — 250N000011 HC RX IP 250 OP 636: Performed by: INTERNAL MEDICINE

## 2023-09-08 PROCEDURE — 250N000009 HC RX 250: Performed by: INTERNAL MEDICINE

## 2023-09-08 PROCEDURE — 93312 ECHO TRANSESOPHAGEAL: CPT | Mod: 26 | Performed by: INTERNAL MEDICINE

## 2023-09-08 PROCEDURE — 93325 DOPPLER ECHO COLOR FLOW MAPG: CPT

## 2023-09-08 PROCEDURE — 85027 COMPLETE CBC AUTOMATED: CPT | Performed by: INTERNAL MEDICINE

## 2023-09-08 PROCEDURE — 99152 MOD SED SAME PHYS/QHP 5/>YRS: CPT | Performed by: INTERNAL MEDICINE

## 2023-09-08 PROCEDURE — 83735 ASSAY OF MAGNESIUM: CPT | Performed by: NURSE PRACTITIONER

## 2023-09-08 PROCEDURE — 250N000009 HC RX 250

## 2023-09-08 PROCEDURE — 93320 DOPPLER ECHO COMPLETE: CPT | Mod: 26 | Performed by: INTERNAL MEDICINE

## 2023-09-08 PROCEDURE — 370N000017 HC ANESTHESIA TECHNICAL FEE, PER MIN

## 2023-09-08 PROCEDURE — 258N000001 HC RX 258: Performed by: INTERNAL MEDICINE

## 2023-09-08 PROCEDURE — 93005 ELECTROCARDIOGRAM TRACING: CPT

## 2023-09-08 RX ORDER — FENTANYL CITRATE 50 UG/ML
25 INJECTION, SOLUTION INTRAMUSCULAR; INTRAVENOUS
Status: DISCONTINUED | OUTPATIENT
Start: 2023-09-08 | End: 2023-09-09 | Stop reason: HOSPADM

## 2023-09-08 RX ORDER — ACYCLOVIR 200 MG/1
9.5 CAPSULE ORAL
Status: DISCONTINUED | OUTPATIENT
Start: 2023-09-08 | End: 2023-09-09 | Stop reason: HOSPADM

## 2023-09-08 RX ORDER — OXYCODONE HYDROCHLORIDE 5 MG/1
10 TABLET ORAL
Status: CANCELLED | OUTPATIENT
Start: 2023-09-08

## 2023-09-08 RX ORDER — HYDROMORPHONE HCL IN WATER/PF 6 MG/30 ML
0.2 PATIENT CONTROLLED ANALGESIA SYRINGE INTRAVENOUS EVERY 5 MIN PRN
Status: CANCELLED | OUTPATIENT
Start: 2023-09-08

## 2023-09-08 RX ORDER — POTASSIUM CHLORIDE 750 MG/1
40 TABLET, EXTENDED RELEASE ORAL
Status: DISCONTINUED | OUTPATIENT
Start: 2023-09-08 | End: 2023-09-09 | Stop reason: HOSPADM

## 2023-09-08 RX ORDER — ONDANSETRON 4 MG/1
4 TABLET, ORALLY DISINTEGRATING ORAL EVERY 30 MIN PRN
Status: CANCELLED | OUTPATIENT
Start: 2023-09-08

## 2023-09-08 RX ORDER — FENTANYL CITRATE 50 UG/ML
50 INJECTION, SOLUTION INTRAMUSCULAR; INTRAVENOUS EVERY 5 MIN PRN
Status: CANCELLED | OUTPATIENT
Start: 2023-09-08

## 2023-09-08 RX ORDER — ONDANSETRON 2 MG/ML
4 INJECTION INTRAMUSCULAR; INTRAVENOUS EVERY 30 MIN PRN
Status: CANCELLED | OUTPATIENT
Start: 2023-09-08

## 2023-09-08 RX ORDER — FLUMAZENIL 0.1 MG/ML
0.2 INJECTION, SOLUTION INTRAVENOUS
Status: DISCONTINUED | OUTPATIENT
Start: 2023-09-08 | End: 2023-09-09 | Stop reason: HOSPADM

## 2023-09-08 RX ORDER — FENTANYL CITRATE 50 UG/ML
25 INJECTION, SOLUTION INTRAMUSCULAR; INTRAVENOUS EVERY 5 MIN PRN
Status: CANCELLED | OUTPATIENT
Start: 2023-09-08

## 2023-09-08 RX ORDER — LIDOCAINE 40 MG/G
CREAM TOPICAL
Status: DISCONTINUED | OUTPATIENT
Start: 2023-09-08 | End: 2023-09-09 | Stop reason: HOSPADM

## 2023-09-08 RX ORDER — NALOXONE HYDROCHLORIDE 0.4 MG/ML
0.2 INJECTION, SOLUTION INTRAMUSCULAR; INTRAVENOUS; SUBCUTANEOUS
Status: DISCONTINUED | OUTPATIENT
Start: 2023-09-08 | End: 2023-09-09 | Stop reason: HOSPADM

## 2023-09-08 RX ORDER — NALOXONE HYDROCHLORIDE 0.4 MG/ML
0.4 INJECTION, SOLUTION INTRAMUSCULAR; INTRAVENOUS; SUBCUTANEOUS
Status: DISCONTINUED | OUTPATIENT
Start: 2023-09-08 | End: 2023-09-09 | Stop reason: HOSPADM

## 2023-09-08 RX ORDER — SODIUM CHLORIDE 9 MG/ML
INJECTION, SOLUTION INTRAVENOUS CONTINUOUS PRN
Status: DISCONTINUED | OUTPATIENT
Start: 2023-09-08 | End: 2023-09-09 | Stop reason: HOSPADM

## 2023-09-08 RX ORDER — FENTANYL CITRATE 50 UG/ML
50 INJECTION, SOLUTION INTRAMUSCULAR; INTRAVENOUS ONCE
Status: COMPLETED | OUTPATIENT
Start: 2023-09-08 | End: 2023-09-08

## 2023-09-08 RX ORDER — MAGNESIUM SULFATE HEPTAHYDRATE 40 MG/ML
2 INJECTION, SOLUTION INTRAVENOUS
Status: DISCONTINUED | OUTPATIENT
Start: 2023-09-08 | End: 2023-09-09 | Stop reason: HOSPADM

## 2023-09-08 RX ORDER — OXYCODONE HYDROCHLORIDE 5 MG/1
5 TABLET ORAL
Status: CANCELLED | OUTPATIENT
Start: 2023-09-08

## 2023-09-08 RX ORDER — SODIUM CHLORIDE, SODIUM LACTATE, POTASSIUM CHLORIDE, CALCIUM CHLORIDE 600; 310; 30; 20 MG/100ML; MG/100ML; MG/100ML; MG/100ML
INJECTION, SOLUTION INTRAVENOUS CONTINUOUS
Status: CANCELLED | OUTPATIENT
Start: 2023-09-08

## 2023-09-08 RX ORDER — POTASSIUM CHLORIDE 750 MG/1
20 TABLET, EXTENDED RELEASE ORAL
Status: DISCONTINUED | OUTPATIENT
Start: 2023-09-08 | End: 2023-09-09 | Stop reason: HOSPADM

## 2023-09-08 RX ORDER — HYDROMORPHONE HCL IN WATER/PF 6 MG/30 ML
0.4 PATIENT CONTROLLED ANALGESIA SYRINGE INTRAVENOUS EVERY 5 MIN PRN
Status: CANCELLED | OUTPATIENT
Start: 2023-09-08

## 2023-09-08 RX ORDER — LIDOCAINE HYDROCHLORIDE 20 MG/ML
15 SOLUTION OROPHARYNGEAL ONCE
Status: COMPLETED | OUTPATIENT
Start: 2023-09-08 | End: 2023-09-08

## 2023-09-08 RX ADMIN — METHOHEXITAL SODIUM 20 MG: 500 INJECTION, POWDER, LYOPHILIZED, FOR SOLUTION INTRAMUSCULAR; INTRAVENOUS; RECTAL at 10:16

## 2023-09-08 RX ADMIN — LIDOCAINE HYDROCHLORIDE 15 ML: 20 SOLUTION OROPHARYNGEAL at 09:13

## 2023-09-08 RX ADMIN — MIDAZOLAM 1 MG: 1 INJECTION INTRAMUSCULAR; INTRAVENOUS at 09:33

## 2023-09-08 RX ADMIN — MIDAZOLAM 1 MG: 1 INJECTION INTRAMUSCULAR; INTRAVENOUS at 09:29

## 2023-09-08 RX ADMIN — TOPICAL ANESTHETIC 0.5 ML: 200 SPRAY DENTAL; PERIODONTAL at 09:14

## 2023-09-08 RX ADMIN — FENTANYL CITRATE 25 MCG: 50 INJECTION, SOLUTION INTRAMUSCULAR; INTRAVENOUS at 09:33

## 2023-09-08 RX ADMIN — METHOHEXITAL SODIUM 50 MG: 500 INJECTION, POWDER, LYOPHILIZED, FOR SOLUTION INTRAMUSCULAR; INTRAVENOUS; RECTAL at 10:14

## 2023-09-08 RX ADMIN — MIDAZOLAM 2 MG: 1 INJECTION INTRAMUSCULAR; INTRAVENOUS at 09:26

## 2023-09-08 RX ADMIN — SODIUM CHLORIDE 9.5 ML: 9 INJECTION, SOLUTION INTRAMUSCULAR; INTRAVENOUS; SUBCUTANEOUS at 09:45

## 2023-09-08 RX ADMIN — FENTANYL CITRATE 25 MCG: 50 INJECTION, SOLUTION INTRAMUSCULAR; INTRAVENOUS at 09:29

## 2023-09-08 RX ADMIN — FENTANYL CITRATE 50 MCG: 50 INJECTION, SOLUTION INTRAMUSCULAR; INTRAVENOUS at 09:26

## 2023-09-08 ASSESSMENT — ACTIVITIES OF DAILY LIVING (ADL)
ADLS_ACUITY_SCORE: 35

## 2023-09-08 ASSESSMENT — ENCOUNTER SYMPTOMS: DYSRHYTHMIAS: 1

## 2023-09-08 NOTE — PROGRESS NOTES
Pt arrived in ECHO department for scheduled HERBERT.   Procedure explained, questions answered and consent signed. Discharge instructions discussed with patient. Spouse, Dilcia is  home.  Pt's throat sprayed at 0910, therefore pt will not be able to eat or drink until 2 hours after at 1110. Informed pt of this time and encouraged to start with warm fluids and soft foods.    Pt tolerated procedure well, and was given a total of 100 mcg IV fentanyl and 4 mg IV versed for conscious sedation. Pt denied throat or chest pain after HERBERT complete.   HERBERT probe 63 used for procedure.  No clots visualized on HERBERT so proceeded with DCCV. Anesthesia gave pt 70 mg IV brevital for sedation and pt was DCCV twice at 150 Joules and 200 Joules to a SR.  Pt denied chest or throat pain after procedure and was D/C home after awake and VSS. Escorted out to front lobby by staff in w/c to meet pt's ride home.

## 2023-09-08 NOTE — DISCHARGE INSTRUCTIONS
GOING HOME AFTER YOUR TRANSESOPHAGEAL ECHOCARDIOGRAM AND CARDIOVERSION    FOR NEXT 24 HOURS:  An adult should stay with you.  Relax and take it easy.  DO NOT make any important legal decisions.  DO NOT drive or operate machines at home or at work.  DO NOT consume any alcohol today.  Resume your regular diet 2 HOURS after procedure @ __________ and drink plenty of fluids.  If your throat is sore, eat cold, bland, soft foods.  If you have any redness/skin sorness where the patches were placed, you may use aloe vera gel or 1% hydrocortisone cream to the skin (sold at drug stores)  Take Tylenol (Acetaminophen) per your provider's recommendations as needed to help relieve local pain.     CALL YOUR HEALTHCARE PROVIDER IF:  New pain or trouble swallowing  New stomach or chest pain  You develop nausea or vomiting  Fever above 100.6 F or greater  You develop hives or a rash or any unexplained itching  Have irregular heartbeat or fast pulse  Feel faint, dizzy, or lightheaded  Have chest pain with increased activity  Have bleeding issues from blood-thinning medicines (vomiting that looks like coffee grounds or contains blood OR black, bloody stools).    CALL 911 RIGHT AWAY IF YOU HAVE:  Pain in your chest, arm, shoulder, neck, or upper back  Shortness of breath  Loss of vision, speech, or strength or coordination in any body part  Weakness in your arm or leg  Uncontrolled bleeding  Feel unstable in any way     FOLLOW UP APPOINTMENT:    Follow up as scheduled with EP/Cardiology Provider     ADDITIONAL INFORMATION:    If you have any questions:        Call the Echo Lab Nurse at [phone], press 4 (Monday-Friday 7:00 am - 4:00 pm)        Call the hospital: [phone] and ask them to page [phone] (after 4:00 pm and on   weekends or holidays)      Cardiovascular Clinic:   [address] [place] [address]. [place] [address]  Your Care Team:  EP Cardiology   Telephone Number     Flora Pittman RN [phone]    After  business hours: [phone], select option 4, ask for EP Fellow on call to be paged.     For scheduling appts or procedures:    Macy Espinoza   [phone]   For the Device Clinic (Pacemakers and ICD's)   RN's  During business hours: [phone]     After business hours:   [phone]- select option 4 and ask for job code [id number].       As always, Thank you for trusting us with your health care needs!

## 2023-09-08 NOTE — H&P
Electrophysiology Pre-Procedure History and Physical    Angie Lopes MRN# [id number]   Age: 48 year old YOB: 1975      Date of Procedure: 9/8/2023 [facility]      Date of Exam 9/8/2023 Facility (Same day)       HPI:  Angie Lopes is a ,48 year old male with past medical history significant for PAF (CHADSVASC 0) and GIB s/p polypectomy. He reports having symptoms of palpitations for last 5 years. He states he would have intermittent palpitations lasting up to 30 minutes and then self resolving. Then in 8/2021, he started having increasing frequency of symptoms. He reports in 6/2021 he had GIB and had polyp removed. He then presented to [facility] on 10/21/21 with a 5 day history of palpitations and orthostatic dizziness and was found to be in AF. He then established with EP in 2021. His atrial fibrillation has been managed conservatively with as needed diltiazem for palpitations. Recently from February-April of 2023 he has noticed and increased in frequency and duration of episodes. At the last visit with Dr Chavarria in July, they discussed role of AAT vs ablation for further control of symptoms. He ws going to think about pursuing these. Recently he wrote in reporting he has been in A fib for about 10 days. Discussed cardioversion with him, he was agreeable. He is interested in pursuing ablation in the future. He has apt scheduled with Dr Chavarria in November to further discuss. Today, he presents for HERBERT/DCCV.          Active problem list:     Patient Active Problem List    Diagnosis Date Noted    S/P vasectomy 08/24/2012     Priority: Medium     2004      External hemorrhoids 08/24/2012     Priority: Medium    CARDIOVASCULAR SCREENING; LDL GOAL LESS THAN 160 05/09/2010     Priority: Medium            Medications (include herbals and vitamins):      Current Outpatient Medications   Medication Sig    apixaban ANTICOAGULANT (ELIQUIS) 5 MG tablet Take 1  tablet (5 mg) by mouth 2 times daily    Ascorbic Acid (VITAMIN C PO)     diltiazem (CARDIZEM) 30 MG tablet Take 1 tablet (30 mg) by mouth 3 times daily as needed (with AF episode)    Omega-3 Fatty Acids (FISH OIL OMEGA-3 PO)     vitamin B-Complex Take 1 tablet by mouth daily    VITAMIN D PO     Zinc Acetate, Oral, (ZINC ACETATE PO)      No current facility-administered medications for this encounter.           Medication List         Notice    Cannot display patient medications because the patient has not yet been checked in.              Allergies:    No Known Allergies          Social History:     Social History     Tobacco Use    Smoking status: Never    Smokeless tobacco: Never   Substance Use Topics    Alcohol use: Yes     Comment: occ. wine 3-4 times a week             Physical Exam:   All vitals have been reviewed    Airway assessment:   Patient is able to open mouth wide  Patient is able to stick out tongue}      ENT:   normocepalic, without obvious abnormality     Neck:   supple, symmetrical, trachea midline     Lungs:   No increased work of breathing, good air exchange, clear to auscultation bilaterally, no crackles or wheezing     Cardiovascular:   normal S1 and S2 and no edema             Lab / Radiology Results:     Lab Results   Component Value Date    WBC 7.0 11/15/2021    WBC 4.7 06/11/2021    RBC 6.09 11/15/2021    RBC 5.26 06/11/2021    HGB 15.3 11/15/2021    HGB 13.3 06/11/2021    HCT 48.8 11/15/2021    HCT 41.8 06/11/2021    MCV 80 11/15/2021    MCV 80 06/11/2021    RDW 14.2 11/15/2021    RDW 13.9 06/11/2021     11/15/2021     06/11/2021      Lab Results   Component Value Date    WBC 7.0 11/15/2021    WBC 4.7 06/11/2021     Lab Results   Component Value Date     11/15/2021     06/11/2021     Lab Results   Component Value Date    HGB 15.3 11/15/2021    HGB 13.3 06/11/2021    HCT 48.8 11/15/2021    HCT 41.8 06/11/2021     Lab Results   Component Value Date      11/15/2021     06/11/2021    CO2 26 11/15/2021    CO2 28 06/11/2021    BUN 17 11/15/2021    BUN 16 06/11/2021     Lab Results   Component Value Date     11/15/2021     06/11/2021    CO2 26 11/15/2021    CO2 28 06/11/2021    BUN 17 11/15/2021    BUN 16 06/11/2021     Lab Results   Component Value Date    TSH 1.53 11/15/2021    TSH 2.10 06/11/2021            Plan:   Patient's active problems diagnostically and therapeutically optimized for the planned procedure. Patient here for HERBERT/DCCV. Procedure explained in detail to patient including indications, risks, and benefits. Patient states understanding and wishes to procced.       Antonieta Martínez PA-C  [facility]  Electrophysiology Consult Service  Pager: [phone]

## 2023-09-08 NOTE — ANESTHESIA PREPROCEDURE EVALUATION
Anesthesia Pre-Procedure Evaluation    Patient: Angie Lopes   MRN: [id number] : 1975        Procedure : Procedure(s):  Anesthesia cardioversion@0900          No past medical history on file.   Past Surgical History:   Procedure Laterality Date    VASECTOMY        No Known Allergies   Social History     Tobacco Use    Smoking status: Never    Smokeless tobacco: Never   Substance Use Topics    Alcohol use: Yes     Comment: occ. wine 3-4 times a week       Wt Readings from Last 1 Encounters:   23 101.1 kg (222 lb 14.4 oz)        Anesthesia Evaluation   Pt has had prior anesthetic. Type: General and MAC.        ROS/MED HX  ENT/Pulmonary:  - neg pulmonary ROS     Neurologic:  - neg neurologic ROS     Cardiovascular:     (+)  - -   -  - -                        dysrhythmias, a-fib, Irregular Heartbeat/Palpitations,            METS/Exercise Tolerance:     Hematologic:  - neg hematologic  ROS     Musculoskeletal:  - neg musculoskeletal ROS     GI/Hepatic:  - neg GI/hepatic ROS     Renal/Genitourinary:  - neg Renal ROS     Endo:  - neg endo ROS     Psychiatric/Substance Use:  - neg psychiatric ROS     Infectious Disease:  - neg infectious disease ROS     Malignancy:  - neg malignancy ROS     Other:            Physical Exam    Airway        Mallampati: I   TM distance: > 3 FB   Neck ROM: full   Mouth opening: > 3 cm    Respiratory Devices and Support         Dental       (+) Minor Abnormalities - some fillings, tiny chips      Cardiovascular   cardiovascular exam normal       Rhythm and rate: regular and normal     Pulmonary           breath sounds clear to auscultation           OUTSIDE LABS:  CBC:   Lab Results   Component Value Date    WBC 5.7 2023    WBC 7.0 11/15/2021    HGB 15.1 2023    HGB 15.3 11/15/2021    HCT 47.3 2023    HCT 48.8 11/15/2021     2023     11/15/2021     BMP:   Lab Results   Component Value Date     2023     11/15/2021     POTASSIUM 4.1 09/08/2023    POTASSIUM 4.1 11/15/2021    CHLORIDE 106 09/08/2023    CHLORIDE 106 11/15/2021    CO2 25 09/08/2023    CO2 26 11/15/2021    BUN 14.6 09/08/2023    BUN 17 11/15/2021    CR 1.44 (H) 09/08/2023    CR 1.24 11/15/2021    GLC 97 09/08/2023    GLC 88 11/15/2021     COAGS:   Lab Results   Component Value Date    INR 1.02 10/21/2021     POC: No results found for: BGM, HCG, HCGS  HEPATIC:   Lab Results   Component Value Date    ALBUMIN 4.0 11/15/2021    PROTTOTAL 7.7 11/15/2021    ALT 33 11/15/2021    AST 16 11/15/2021    ALKPHOS 77 11/15/2021    BILITOTAL 0.6 11/15/2021     OTHER:   Lab Results   Component Value Date    JAKE 9.6 09/08/2023    MAG 2.1 09/08/2023    TSH 1.53 11/15/2021       Anesthesia Plan    ASA Status:  4    NPO Status:  NPO Appropriate    Anesthesia Type: General.     - Airway: ETT   Induction: Intravenous.   Maintenance: Inhalation.        Consents    Anesthesia Plan(s) and associated risks, benefits, and realistic alternatives discussed. Questions answered and patient/representative(s) expressed understanding.     - Discussed: Risks, Benefits and Alternatives for BOTH SEDATION and the PROCEDURE were discussed     - Discussed with:  Patient      - Extended Intubation/Ventilatory Support Discussed: Yes.      - Patient is DNR/DNI Status: No     Use of blood products discussed: Yes.     - Discussed with: Patient.     Postoperative Care    Pain management: IV analgesics.   PONV prophylaxis: Ondansetron (or other 5HT-3), Dexamethasone or Solumedrol     Comments:    Other Comments: The material risks, benefits and alternatives were discussed in detail.  The patient agrees to proceed.            Arden Lai MD

## 2023-09-08 NOTE — PROCEDURES
[facility]    Procedure: Cardioversion    Date/Time: 9/8/2023 11:21 AM    Performed by: Antonieta Martínez PA-C  Authorized by: Belen Magana APRN CNP      UNIVERSAL PROTOCOL   Site Marked: NA  Prior Images Obtained and Reviewed:  NA  Required items: Required blood products, implants, devices and special equipment available    Patient identity confirmed:  Verbally with patient  Patient was reevaluated immediately before administering moderate or deep sedation or anesthesia  Confirmation Checklist:  Patient's identity using two indicators, correct equipment/implants were available, relevant allergies and procedure was appropriate and matched the consent or emergent situation  Time out: Immediately prior to the procedure a time out was called    Universal Protocol: the [facility] Universal Protocol was followed       ANESTHESIA    Anesthesia was administered and monitored by anesthesiology.  See anesthesia documentation for details.    PROCEDURE DETAILS  Pre-procedure rhythm: atrial fibrillation  Patient position: patient was placed in a supine position  Chest area: chest area exposed  Electrodes: pads  Electrodes placed: anterior-posterior  Number of attempts: 2    Details of Attempts:  One, 150 J biphasic synchronized shock delivered without successful conversion.   Second, 200 J biphasic synchronized shock delivered with conversion to sinus.   Post-procedure rhythm: normal sinus rhythm  Complications: no complications      PROCEDURE    Patient Tolerance:  Patient tolerated the procedure well with no immediate complications      Antonieta Martínez PA-C  [facility]  Electrophysiology Consult Service  Pager: [phone]

## 2023-09-08 NOTE — ANESTHESIA CARE TRANSFER NOTE
Patient: Angie Lopes    Procedure: Procedure(s):  Anesthesia cardioversion@0900       Diagnosis: Paroxysmal atrial fibrillation (H) [I48.0]  Diagnosis Additional Information: No value filed.    Anesthesia Type:   No value filed.     Note:    Oropharynx: oropharynx clear of all foreign objects and spontaneously breathing  Level of Consciousness: drowsy  Oxygen Supplementation: nasal cannula  Level of Supplemental Oxygen (L/min / FiO2): 4  Independent Airway: airway patency satisfactory and stable    Vital Signs Stable: post-procedure vital signs reviewed and stable  Report to RN Given: handoff report given  Patient transferred to: Cardiac Special Care          Vitals:  Vitals Value Taken Time   /86 (74) 9/8/23 1020   Temp     Pulse 64 9/8/23 1020   Resp 14 9/8/23 1020   SpO2 99 9/8/23 1020       Electronically Signed By: BRENT Chinchilla CRNA  September 8, 2023  10:23 AM

## 2023-09-08 NOTE — ANESTHESIA POSTPROCEDURE EVALUATION
Patient: Angie Lopes    Procedure: Procedure(s):  Anesthesia cardioversion@0900       Anesthesia Type:  General    Note:     Postop Pain Control: Uneventful            Sign Out: Well controlled pain   PONV: No   Neuro/Psych: Uneventful            Sign Out: Acceptable/Baseline neuro status   Airway/Respiratory: Uneventful            Sign Out: Acceptable/Baseline resp. status   CV/Hemodynamics: Uneventful            Sign Out: Acceptable CV status; No obvious hypovolemia; No obvious fluid overload   Other NRE: NONE   DID A NON-ROUTINE EVENT OCCUR? No    Event details/Postop Comments:  No complications at this time.           Last vitals:  There were no vitals filed for this visit.    Electronically Signed By: Arden Lai MD  September 8, 2023  10:32 AM

## 2023-09-10 ENCOUNTER — HEALTH MAINTENANCE LETTER (OUTPATIENT)
Age: 48
End: 2023-09-10

## 2023-09-11 LAB
ATRIAL RATE - MUSE: 52 BPM
ATRIAL RATE - MUSE: NORMAL BPM
DIASTOLIC BLOOD PRESSURE - MUSE: NORMAL MMHG
DIASTOLIC BLOOD PRESSURE - MUSE: NORMAL MMHG
INTERPRETATION ECG - MUSE: NORMAL
INTERPRETATION ECG - MUSE: NORMAL
P AXIS - MUSE: 51 DEGREES
P AXIS - MUSE: NORMAL DEGREES
PR INTERVAL - MUSE: 190 MS
PR INTERVAL - MUSE: NORMAL MS
QRS DURATION - MUSE: 84 MS
QRS DURATION - MUSE: 88 MS
QT - MUSE: 406 MS
QT - MUSE: 444 MS
QTC - MUSE: 412 MS
QTC - MUSE: 418 MS
R AXIS - MUSE: -16 DEGREES
R AXIS - MUSE: -8 DEGREES
SYSTOLIC BLOOD PRESSURE - MUSE: NORMAL MMHG
SYSTOLIC BLOOD PRESSURE - MUSE: NORMAL MMHG
T AXIS - MUSE: 10 DEGREES
T AXIS - MUSE: 16 DEGREES
VENTRICULAR RATE- MUSE: 52 BPM
VENTRICULAR RATE- MUSE: 64 BPM

## 2023-11-15 ENCOUNTER — OFFICE VISIT (OUTPATIENT)
Dept: CARDIOLOGY | Facility: CLINIC | Age: 48
End: 2023-11-15
Payer: COMMERCIAL

## 2023-11-15 VITALS
OXYGEN SATURATION: 99 % | HEART RATE: 60 BPM | BODY MASS INDEX: 27.51 KG/M2 | WEIGHT: 225.9 LBS | HEIGHT: 76 IN | DIASTOLIC BLOOD PRESSURE: 89 MMHG | SYSTOLIC BLOOD PRESSURE: 147 MMHG

## 2023-11-15 DIAGNOSIS — I48.0 PAF (PAROXYSMAL ATRIAL FIBRILLATION) (H): Primary | ICD-10-CM

## 2023-11-15 PROCEDURE — 99215 OFFICE O/P EST HI 40 MIN: CPT | Performed by: INTERNAL MEDICINE

## 2023-11-15 PROCEDURE — 99214 OFFICE O/P EST MOD 30 MIN: CPT | Performed by: INTERNAL MEDICINE

## 2023-11-15 PROCEDURE — 93010 ELECTROCARDIOGRAM REPORT: CPT | Performed by: INTERNAL MEDICINE

## 2023-11-15 PROCEDURE — 93005 ELECTROCARDIOGRAM TRACING: CPT

## 2023-11-15 RX ORDER — SAME BUTANEDISULFONATE/BETAINE 400-600 MG
POWDER IN PACKET (EA) ORAL
COMMUNITY
End: 2024-10-01

## 2023-11-15 ASSESSMENT — PAIN SCALES - GENERAL: PAINLEVEL: MODERATE PAIN (4)

## 2023-11-15 NOTE — PATIENT INSTRUCTIONS
Plan:    Afib ablation as scheduled      Your Care Team:  EP Cardiology   Telephone Number     Flora Hamilton RN [phone]    After business hours: [phone], ask for cardiologist on-call   Non-procedure scheduling:    Manda ZEPEDA   [phone]   Procedure scheduling:    Macy Espinoza   [phone]   Device Clinic (Pacemakers, ICDs, Loop Recorders)    During business hours: [phone]  After business hours:   [phone]- select option 4 and ask for job code [id number].       Cardiovascular Clinic:   [address] [place] [address]. [place] [address]      As always, thank you for trusting us with your health care needs!

## 2023-11-15 NOTE — PROGRESS NOTES
ELECTROPHYSIOLOGY CLINIC VISIT  Assessment/Recommendations   Assessment/Plan:    Mr. Lopes is a 48 year old male who has a past medical history significant for PAF (CHADSVASC 0) and GIB s/p polypectomy. He presents for follow up.     Paroxsymal Atrial Fibrillation:   We discussed in detail with the patient management/treatment options for A.fib includin. Stroke Prophylaxis:  CHADSVASC=  0, corresponding to a 0.2-0.5% annual stroke / systemic emolism event rate. indicating NO need for long term oral anticoagulation.   2. Rate Control: PRN diltiazem for palpitations  3. Rhythm Control: Cardioversion, Antiarrhythmics and/or ablation are options for rhythm control. He was scheduled for DCCV; however, he self converted and it was cancelled. A prior zio patch showed 3% AF burden and occasional ectopy. Pt does report he did not have as much A Fib when the monitor was on. He has [employer] luana and has had a number of episodes.Discussed role of AAT and/or ablation. Discussed each option in detail including indications, risks, and benefits in detail.He wanted to pursue ablation which he already has scheduled. He came today per his request for pre ablation consult. The procedural risk of EP study and ablation were discussed in detail. Risks discussed include: vascular complications, CVA, AVB, pericardial effusion and tamponade, esophageal fistula, PV stenosis. AF ablation has 70% success at 1 year, 50% success at 5 years, and 30% need another ablation.  Even if ablation is successful anticoagulation may be needed lifelong. He wishes to proceed with scheduled ablation.   4. Risk Factor Management: BP control, and continue MERA evaluation.  Patient will be seen by sleep medicine in April.       Follow up 3 months post ablation.        History of Present Illness/Subjective    Mr. Angie Lopes is a 48 year old male who comes in today for EP consultation of PAF.    Mr. Lopes is a 48 year old male who has a past  medical history significant for PAF (CHADSVASC 0) and GIB s/p polypectomy.     He reports having symptoms of palpitations for last 5 years. He states he would have intermittent palpitations lasting up to 30 minutes and then self resolving. Then in 8/2021, he started having increasing frequency of symptoms. He reports in 6/2021 he had GIB and had polyp removed. Of note, his Hgb is slowly downtredning again. He then presented to [facility] on 10/21/21 with a 5 day history of palpitation and orthostatic dizziness. He was found to be in AF. He was started on Eliquis and Diltiazem and arranged for close EP clinic visit.     EP Visit 10/22/21: He presents today to establish care.  Patient denies any significant drug use or alcoholism.  Patient appears to be in excellent health but denies any special  diets. He reports feeling that he is out of AF now.  He reports feeling interrupted sleep, never had a sleep evaluation. He denies any melena or bright blood per rectum. He denies chest discomfort, palpitations, abdominal fullness/bloating or peripheral edema, shortness of breath, paroxysmal nocturnal dyspnea, orthopnea, lightheadedness, dizziness, pre-syncope, or syncope. Echo today shows normal structure and function and does show he is now in SR. Current cardiac medications include: Eliquis and Diltiazem.     EP Visit: 2/16/22: He presents today for follow up. He recently saw sleep medicine who plans for sleep study. He reports feeling OK. He stopped caffeine at end of 12/2021. He does continue to have palpitations lasting up to several hours at a time. He denies chest discomfort,  abdominal fullness/bloating or peripheral edema, shortness of breath, paroxysmal nocturnal dyspnea, orthopnea, lightheadedness, dizziness, pre-syncope, or syncope. A zio patch monitor from 1/4/22-1/17/22 showed 6% PAF burden up to 18 hours 41 minutes and 60 NSVT that are likely AF with aberrant conduction. 10 symptom activations 2 showed AF  and 8 showed sinus. Presenting 12 lead ECG shows SB Vent Rate 56 bpm,  ms, QRS 88 ms, QTc 409 ms. Current cardiac medications include: Diltiazem.     EP Visit 2/15/23: He presents for follow up. Since last visit he wrote in with an increase in symptoms and severity in December. He was in [place] tending to his father who was passing. Reports at this time he was having the A Fib for 6-7 days in a row. When he takes the diltiazem, reports relief of symptoms in 1-2 hours. The longest episode was about 48 hours. With the A fib episodes he has chest discomfort, palpitations and dyspnea. Zio was completed on 1/8-1/22 which showed 3% AF burden, symptoms correlated with sinus, ectopy and AF. Reports he felt he had a lesser amount of A fib then normal when zio was on. He denies chest discomfort, peripheral edema, pre-syncope, or syncope. Presenting 12 lead ECG shows SR Vent Rate 59 bpm,  ms, QRS 84 ms, QTc 413 ms. Current cardiac medications include: Diltiazem.     EP Visit 7/5/23; He presents today for follow up. He stated CPAP in 4/2023.  A zio patch from 6/2023 reports in progress still. He reports feeling intermittent AF episodes reported on Kardia 2/15-2/18, 3/9-3/11, 3/21, 3/28-3/30, 4/7, 4/15, 6/8, 6/14, 6/17, 6/19-6/22. He denies chest discomfort, palpitations, abdominal fullness/bloating or peripheral edema, shortness of breath, paroxysmal nocturnal dyspnea, orthopnea, lightheadedness, dizziness, pre-syncope, or syncope. Presenting 12 lead ECG shows NSR Vent Rate 61 bpm,  ms, QRS 88 ms, QTc 412 ms. Current cardiac medications include: Diltiazem.     He presents today for follow up. He had DCCV 9/5/23. He reported he wants to pursue PVI which is set up for 12/21/23. He reports feeling at baseline. He continues to have intermittent AF episdoes. He denies chest discomfort, abdominal fullness/bloating or peripheral edema, shortness of breath, paroxysmal nocturnal dyspnea, orthopnea, lightheadedness,  "dizziness, pre-syncope, or syncope. Presenting 12 lead ECG shows NSR Vent Rate 61 bpm,  ms, QRS 88 ms, QTc 414 ms. Current cardiac medications include: Diltiazem and Eliquis.       I have reviewed and updated the patient's Past Medical History, Social History, Family History and Medication List.     Cardiographics (Personally Reviewed) :   10/22/21 Echo:   Interpretation Summary  Global and regional left ventricular function is normal with an EF of 60-65%.  Right ventricular function, chamber size, wall motion, and thickness are normal.  The inferior vena cava is normal.  No pericardial effusion is present.  There is no prior study for direct comparison.       Physical Examination   BP (!) 147/89 (BP Location: Left arm, Patient Position: Chair, Cuff Size: Adult Regular)   Pulse 60   Ht 1.927 m (6' 3.87\")   Wt 102.5 kg (225 lb 14.4 oz)   SpO2 99%   BMI 27.59 kg/m    Wt Readings from Last 3 Encounters:   07/05/23 101.1 kg (222 lb 14.4 oz)   03/30/23 100.3 kg (221 lb 2 oz)   02/15/23 98.7 kg (217 lb 8 oz)     General Appearance:   Alert, well-appearing and in no acute distress.   HEENT: Atraumatic, normocephalic. MMM.   Chest/Lungs:   Respirations unlabored.  Lungs are clear to auscultation.   Cardiovascular:   Regular rate and rhythm.  S1/S2. No murmur.    Abdomen:  Soft, nontender, nondistended.   Extremities: No cyanosis or clubbing. No edema.    Musculoskeletal: Moves all extremities.  Gait normal.   Skin: Warm, dry, intact.    Neurologic: Mood and affect are appropriate.  Alert and oriented to person, place, time, and situation.            Medications  Allergies   Current Outpatient Medications   Medication Sig Dispense Refill    apixaban ANTICOAGULANT (ELIQUIS) 5 MG tablet Take 1 tablet (5 mg) by mouth 2 times daily 60 tablet 0    Ascorbic Acid (VITAMIN C PO)       diltiazem (CARDIZEM) 30 MG tablet Take 1 tablet (30 mg) by mouth 3 times daily as needed (with AF episode) 50 tablet 0    Omega-3 Fatty " Acids (FISH OIL OMEGA-3 PO)       vitamin B-Complex Take 1 tablet by mouth daily      VITAMIN D PO       Zinc Acetate, Oral, (ZINC ACETATE PO)       No Known Allergies      Lab Results (Personally Reviewed)    Chemistry/lipid CBC Cardiac Enzymes/BNP/TSH/INR   Lab Results   Component Value Date    BUN 14.6 09/08/2023     09/08/2023    CO2 25 09/08/2023     Creatinine   Date Value Ref Range Status   09/08/2023 1.44 (H) 0.67 - 1.17 mg/dL Final   06/11/2021 1.18 0.66 - 1.25 mg/dL Final       Lab Results   Component Value Date    CHOL 172 06/11/2021    HDL 66 06/11/2021    LDL 97 06/11/2021      Lab Results   Component Value Date    WBC 5.7 09/08/2023    HGB 15.1 09/08/2023    HCT 47.3 09/08/2023    MCV 80 09/08/2023     09/08/2023    Lab Results   Component Value Date    TSH 1.53 11/15/2021    INR 1.02 10/21/2021        The patient states understanding and is agreeable with the plan.   Amanuel Chavarria MD [facility]  Cardiology - Electrophysiology    Total time spent on patient visit, reviewing notes, imaging, labs, orders, and completing necessary documentation: 45 minutes.  >50% of visit spent on counseling patient and/or coordination of care.

## 2023-11-15 NOTE — NURSING NOTE
Chief Complaint   Patient presents with    Follow Up     4 mo follow-up AF. DCCV 9/5/23, patient wants to pursue PVI which is set up for 12/21/23.       Vitals were taken, medications reconciled and EKG performed.    Laureano Mcguire, Facilitator  3:46 PM

## 2023-11-15 NOTE — NURSING NOTE
Medication Reconciliation:  Rooming staff reviewed and verified all current medications with patient. An updated med list was included in the AVS.    Test Results Reviewed:  EKG results were reviewed by provider with patient today.     EP Procedure:  Patient was given instructions regarding atrial fibrillation ablation. Patient received an instruction letter today for reference. Discussed purpose, preparation, and procedure with patient. Patient verbalized understanding and agreed to call with further questions or concerns. The EP  was notified of need to schedule procedure and post op follow up appointments.    Return appointment:   Patient given instructions regarding scheduling next clinic visit. Patient verbalized understanding.       Flora Hamilton RN on 11/15/2023 at 4:30 PM

## 2023-11-15 NOTE — LETTER
11/15/2023      RE: Angie Lopes  [address]  [facility] [address]       Dear Colleague,    Thank you for the opportunity to participate in the care of your patient, Angie Lopes, at the [facility] [place] [facility]. Please see a copy of my visit note below.        ELECTROPHYSIOLOGY CLINIC VISIT  Assessment/Recommendations   Assessment/Plan:    Mr. Lopes is a 48 year old male who has a past medical history significant for PAF (CHADSVASC 0) and GIB s/p polypectomy. He presents for follow up.     Paroxsymal Atrial Fibrillation:   We discussed in detail with the patient management/treatment options for A.fib includin. Stroke Prophylaxis:  CHADSVASC=  0, corresponding to a 0.2-0.5% annual stroke / systemic emolism event rate. indicating NO need for long term oral anticoagulation.   2. Rate Control: PRN diltiazem for palpitations  3. Rhythm Control: Cardioversion, Antiarrhythmics and/or ablation are options for rhythm control. He was scheduled for DCCV; however, he self converted and it was cancelled. A prior zio patch showed 3% AF burden and occasional ectopy. Pt does report he did not have as much A Fib when the monitor was on. He has ERUCES luana and has had a number of episodes.Discussed role of AAT and/or ablation. Discussed each option in detail including indications, risks, and benefits in detail.He wanted to pursue ablation which he already has scheduled. He came today per his request for pre ablation consult. The procedural risk of EP study and ablation were discussed in detail. Risks discussed include: vascular complications, CVA, AVB, pericardial effusion and tamponade, esophageal fistula, PV stenosis. AF ablation has 70% success at 1 year, 50% success at 5 years, and 30% need another ablation.  Even if ablation is successful anticoagulation may be needed lifelong. He wishes to proceed with scheduled ablation.   4.  Risk Factor Management: BP control, and continue MERA evaluation.  Patient will be seen by sleep medicine in April.       Follow up 3 months post ablation.        History of Present Illness/Subjective    Mr. Angie Lopes is a 48 year old male who comes in today for EP consultation of PAF.    Mr. Lopes is a 48 year old male who has a past medical history significant for PAF (CHADSVASC 0) and GIB s/p polypectomy.     He reports having symptoms of palpitations for last 5 years. He states he would have intermittent palpitations lasting up to 30 minutes and then self resolving. Then in 8/2021, he started having increasing frequency of symptoms. He reports in 6/2021 he had GIB and had polyp removed. Of note, his Hgb is slowly downtredning again. He then presented to [facility] on 10/21/21 with a 5 day history of palpitation and orthostatic dizziness. He was found to be in AF. He was started on Eliquis and Diltiazem and arranged for close EP clinic visit.     EP Visit 10/22/21: He presents today to establish care.  Patient denies any significant drug use or alcoholism.  Patient appears to be in excellent health but denies any special  diets. He reports feeling that he is out of AF now.  He reports feeling interrupted sleep, never had a sleep evaluation. He denies any melena or bright blood per rectum. He denies chest discomfort, palpitations, abdominal fullness/bloating or peripheral edema, shortness of breath, paroxysmal nocturnal dyspnea, orthopnea, lightheadedness, dizziness, pre-syncope, or syncope. Echo today shows normal structure and function and does show he is now in SR. Current cardiac medications include: Eliquis and Diltiazem.     EP Visit: 2/16/22: He presents today for follow up. He recently saw sleep medicine who plans for sleep study. He reports feeling OK. He stopped caffeine at end of 12/2021. He does continue to have palpitations lasting up to several hours at a time. He denies chest  discomfort,  abdominal fullness/bloating or peripheral edema, shortness of breath, paroxysmal nocturnal dyspnea, orthopnea, lightheadedness, dizziness, pre-syncope, or syncope. A zio patch monitor from 1/4/22-1/17/22 showed 6% PAF burden up to 18 hours 41 minutes and 60 NSVT that are likely AF with aberrant conduction. 10 symptom activations 2 showed AF and 8 showed sinus. Presenting 12 lead ECG shows SB Vent Rate 56 bpm,  ms, QRS 88 ms, QTc 409 ms. Current cardiac medications include: Diltiazem.     EP Visit 2/15/23: He presents for follow up. Since last visit he wrote in with an increase in symptoms and severity in December. He was in [place] tending to his father who was passing. Reports at this time he was having the A Fib for 6-7 days in a row. When he takes the diltiazem, reports relief of symptoms in 1-2 hours. The longest episode was about 48 hours. With the A fib episodes he has chest discomfort, palpitations and dyspnea. Zio was completed on 1/8-1/22 which showed 3% AF burden, symptoms correlated with sinus, ectopy and AF. Reports he felt he had a lesser amount of A fib then normal when zio was on. He denies chest discomfort, peripheral edema, pre-syncope, or syncope. Presenting 12 lead ECG shows SR Vent Rate 59 bpm,  ms, QRS 84 ms, QTc 413 ms. Current cardiac medications include: Diltiazem.     EP Visit 7/5/23; He presents today for follow up. He stated CPAP in 4/2023.  A zio patch from 6/2023 reports in progress still. He reports feeling intermittent AF episodes reported on Kardia 2/15-2/18, 3/9-3/11, 3/21, 3/28-3/30, 4/7, 4/15, 6/8, 6/14, 6/17, 6/19-6/22. He denies chest discomfort, palpitations, abdominal fullness/bloating or peripheral edema, shortness of breath, paroxysmal nocturnal dyspnea, orthopnea, lightheadedness, dizziness, pre-syncope, or syncope. Presenting 12 lead ECG shows NSR Vent Rate 61 bpm,  ms, QRS 88 ms, QTc 412 ms. Current cardiac medications include: Diltiazem.  "    He presents today for follow up. He had DCCV 9/5/23. He reported he wants to pursue PVI which is set up for 12/21/23. He reports feeling at baseline. He continues to have intermittent AF episdoes. He denies chest discomfort, abdominal fullness/bloating or peripheral edema, shortness of breath, paroxysmal nocturnal dyspnea, orthopnea, lightheadedness, dizziness, pre-syncope, or syncope. Presenting 12 lead ECG shows NSR Vent Rate 61 bpm,  ms, QRS 88 ms, QTc 414 ms. Current cardiac medications include: Diltiazem and Eliquis.       I have reviewed and updated the patient's Past Medical History, Social History, Family History and Medication List.     Cardiographics (Personally Reviewed) :   10/22/21 Echo:   Interpretation Summary  Global and regional left ventricular function is normal with an EF of 60-65%.  Right ventricular function, chamber size, wall motion, and thickness are normal.  The inferior vena cava is normal.  No pericardial effusion is present.  There is no prior study for direct comparison.       Physical Examination   BP (!) 147/89 (BP Location: Left arm, Patient Position: Chair, Cuff Size: Adult Regular)   Pulse 60   Ht 1.927 m (6' 3.87\")   Wt 102.5 kg (225 lb 14.4 oz)   SpO2 99%   BMI 27.59 kg/m    Wt Readings from Last 3 Encounters:   07/05/23 101.1 kg (222 lb 14.4 oz)   03/30/23 100.3 kg (221 lb 2 oz)   02/15/23 98.7 kg (217 lb 8 oz)     General Appearance:   Alert, well-appearing and in no acute distress.   HEENT: Atraumatic, normocephalic. MMM.   Chest/Lungs:   Respirations unlabored.  Lungs are clear to auscultation.   Cardiovascular:   Regular rate and rhythm.  S1/S2. No murmur.    Abdomen:  Soft, nontender, nondistended.   Extremities: No cyanosis or clubbing. No edema.    Musculoskeletal: Moves all extremities.  Gait normal.   Skin: Warm, dry, intact.    Neurologic: Mood and affect are appropriate.  Alert and oriented to person, place, time, and situation.            Medications  " Allergies   Current Outpatient Medications   Medication Sig Dispense Refill    apixaban ANTICOAGULANT (ELIQUIS) 5 MG tablet Take 1 tablet (5 mg) by mouth 2 times daily 60 tablet 0    Ascorbic Acid (VITAMIN C PO)       diltiazem (CARDIZEM) 30 MG tablet Take 1 tablet (30 mg) by mouth 3 times daily as needed (with AF episode) 50 tablet 0    Omega-3 Fatty Acids (FISH OIL OMEGA-3 PO)       vitamin B-Complex Take 1 tablet by mouth daily      VITAMIN D PO       Zinc Acetate, Oral, (ZINC ACETATE PO)       No Known Allergies      Lab Results (Personally Reviewed)    Chemistry/lipid CBC Cardiac Enzymes/BNP/TSH/INR   Lab Results   Component Value Date    BUN 14.6 09/08/2023     09/08/2023    CO2 25 09/08/2023     Creatinine   Date Value Ref Range Status   09/08/2023 1.44 (H) 0.67 - 1.17 mg/dL Final   06/11/2021 1.18 0.66 - 1.25 mg/dL Final       Lab Results   Component Value Date    CHOL 172 06/11/2021    HDL 66 06/11/2021    LDL 97 06/11/2021      Lab Results   Component Value Date    WBC 5.7 09/08/2023    HGB 15.1 09/08/2023    HCT 47.3 09/08/2023    MCV 80 09/08/2023     09/08/2023    Lab Results   Component Value Date    TSH 1.53 11/15/2021    INR 1.02 10/21/2021        The patient states understanding and is agreeable with the plan.   Amanuel Chavarria MD [facility]  Cardiology - Electrophysiology    Total time spent on patient visit, reviewing notes, imaging, labs, orders, and completing necessary documentation: 45 minutes.  >50% of visit spent on counseling patient and/or coordination of care.

## 2023-11-16 LAB
ATRIAL RATE - MUSE: 61 BPM
DIASTOLIC BLOOD PRESSURE - MUSE: NORMAL MMHG
INTERPRETATION ECG - MUSE: NORMAL
P AXIS - MUSE: 60 DEGREES
PR INTERVAL - MUSE: 164 MS
QRS DURATION - MUSE: 88 MS
QT - MUSE: 412 MS
QTC - MUSE: 414 MS
R AXIS - MUSE: 6 DEGREES
SYSTOLIC BLOOD PRESSURE - MUSE: NORMAL MMHG
T AXIS - MUSE: 30 DEGREES
VENTRICULAR RATE- MUSE: 61 BPM

## 2023-12-19 ENCOUNTER — ANESTHESIA EVENT (OUTPATIENT)
Dept: CARDIOLOGY | Facility: CLINIC | Age: 48
End: 2023-12-19
Payer: COMMERCIAL

## 2023-12-20 ENCOUNTER — HOSPITAL ENCOUNTER (OUTPATIENT)
Dept: MRI IMAGING | Facility: CLINIC | Age: 48
Discharge: HOME | End: 2023-12-20
Attending: INTERNAL MEDICINE
Payer: COMMERCIAL

## 2023-12-20 DIAGNOSIS — I48.0 PAF (PAROXYSMAL ATRIAL FIBRILLATION) (H): ICD-10-CM

## 2023-12-20 PROCEDURE — A9585 GADOBUTROL INJECTION: HCPCS | Mod: JZ | Performed by: INTERNAL MEDICINE

## 2023-12-20 PROCEDURE — 255N000002 HC RX 255 OP 636: Mod: JZ | Performed by: INTERNAL MEDICINE

## 2023-12-20 PROCEDURE — 71555 MRI ANGIO CHEST W OR W/O DYE: CPT

## 2023-12-20 PROCEDURE — 75561 CARDIAC MRI FOR MORPH W/DYE: CPT | Mod: 26 | Performed by: INTERNAL MEDICINE

## 2023-12-20 PROCEDURE — 71555 MRI ANGIO CHEST W OR W/O DYE: CPT | Mod: 26 | Performed by: INTERNAL MEDICINE

## 2023-12-20 PROCEDURE — 75561 CARDIAC MRI FOR MORPH W/DYE: CPT

## 2023-12-20 RX ORDER — GADOBUTROL 604.72 MG/ML
13 INJECTION INTRAVENOUS ONCE
Status: COMPLETED | OUTPATIENT
Start: 2023-12-20 | End: 2023-12-20

## 2023-12-20 RX ADMIN — GADOBUTROL 13 ML: 604.72 INJECTION INTRAVENOUS at 12:28

## 2023-12-21 ENCOUNTER — NURSE TRIAGE (OUTPATIENT)
Dept: CARDIOLOGY | Facility: CLINIC | Age: 48
End: 2023-12-21

## 2023-12-21 ENCOUNTER — HOSPITAL ENCOUNTER (OUTPATIENT)
Facility: CLINIC | Age: 48
Discharge: HOME | End: 2023-12-21
Attending: INTERNAL MEDICINE | Admitting: INTERNAL MEDICINE
Payer: COMMERCIAL

## 2023-12-21 ENCOUNTER — ANESTHESIA (OUTPATIENT)
Dept: CARDIOLOGY | Facility: CLINIC | Age: 48
End: 2023-12-21
Payer: COMMERCIAL

## 2023-12-21 ENCOUNTER — PATIENT OUTREACH (OUTPATIENT)
Dept: CARDIOLOGY | Facility: CLINIC | Age: 48
End: 2023-12-21

## 2023-12-21 VITALS
SYSTOLIC BLOOD PRESSURE: 126 MMHG | OXYGEN SATURATION: 98 % | HEIGHT: 75 IN | DIASTOLIC BLOOD PRESSURE: 93 MMHG | TEMPERATURE: 97.9 F | BODY MASS INDEX: 27.44 KG/M2 | WEIGHT: 220.68 LBS | HEART RATE: 81 BPM | RESPIRATION RATE: 21 BRPM

## 2023-12-21 DIAGNOSIS — I48.0 PAF (PAROXYSMAL ATRIAL FIBRILLATION) (H): ICD-10-CM

## 2023-12-21 LAB
ACT BLD: 139 SECONDS (ref 74–150)
ACT BLD: 213 SECONDS (ref 74–150)
ACT BLD: 228 SECONDS (ref 74–150)
ACT BLD: 278 SECONDS (ref 74–150)
ACT BLD: 298 SECONDS (ref 74–150)
ACT BLD: 333 SECONDS (ref 74–150)
ACT BLD: 333 SECONDS (ref 74–150)
ACT BLD: 356 SECONDS (ref 74–150)
ACT BLD: 391 SECONDS (ref 74–150)
ANION GAP SERPL CALCULATED.3IONS-SCNC: 10 MMOL/L (ref 7–15)
BUN SERPL-MCNC: 16.3 MG/DL (ref 6–20)
CALCIUM SERPL-MCNC: 9.4 MG/DL (ref 8.6–10)
CHLORIDE SERPL-SCNC: 106 MMOL/L (ref 98–107)
CREAT SERPL-MCNC: 1.25 MG/DL (ref 0.67–1.17)
DEPRECATED HCO3 PLAS-SCNC: 26 MMOL/L (ref 22–29)
EGFRCR SERPLBLD CKD-EPI 2021: 71 ML/MIN/1.73M2
ERYTHROCYTE [DISTWIDTH] IN BLOOD BY AUTOMATED COUNT: 14.4 % (ref 10–15)
GLUCOSE SERPL-MCNC: 94 MG/DL (ref 70–99)
HCT VFR BLD AUTO: 43.1 % (ref 40–53)
HGB BLD-MCNC: 13.6 G/DL (ref 13.3–17.7)
MCH RBC QN AUTO: 25.7 PG (ref 26.5–33)
MCHC RBC AUTO-ENTMCNC: 31.6 G/DL (ref 31.5–36.5)
MCV RBC AUTO: 81 FL (ref 78–100)
PLATELET # BLD AUTO: 191 10E3/UL (ref 150–450)
POTASSIUM SERPL-SCNC: 3.9 MMOL/L (ref 3.4–5.3)
RBC # BLD AUTO: 5.3 10E6/UL (ref 4.4–5.9)
SODIUM SERPL-SCNC: 142 MMOL/L (ref 135–145)
WBC # BLD AUTO: 5.2 10E3/UL (ref 4–11)

## 2023-12-21 PROCEDURE — 250N000009 HC RX 250

## 2023-12-21 PROCEDURE — 80048 BASIC METABOLIC PNL TOTAL CA: CPT | Performed by: NURSE PRACTITIONER

## 2023-12-21 PROCEDURE — C1733 CATH, EP, OTHR THAN COOL-TIP: HCPCS | Performed by: INTERNAL MEDICINE

## 2023-12-21 PROCEDURE — 93656 COMPRE EP EVAL ABLTJ ATR FIB: CPT | Performed by: INTERNAL MEDICINE

## 2023-12-21 PROCEDURE — C1730 CATH, EP, 19 OR FEW ELECT: HCPCS | Performed by: INTERNAL MEDICINE

## 2023-12-21 PROCEDURE — C1769 GUIDE WIRE: HCPCS | Performed by: INTERNAL MEDICINE

## 2023-12-21 PROCEDURE — 93005 ELECTROCARDIOGRAM TRACING: CPT

## 2023-12-21 PROCEDURE — C1732 CATH, EP, DIAG/ABL, 3D/VECT: HCPCS | Performed by: INTERNAL MEDICINE

## 2023-12-21 PROCEDURE — 272N000001 HC OR GENERAL SUPPLY STERILE: Performed by: INTERNAL MEDICINE

## 2023-12-21 PROCEDURE — C1759 CATH, INTRA ECHOCARDIOGRAPHY: HCPCS | Performed by: INTERNAL MEDICINE

## 2023-12-21 PROCEDURE — 999N000054 HC STATISTIC EKG NON-CHARGEABLE

## 2023-12-21 PROCEDURE — 93656 COMPRE EP EVAL ABLTJ ATR FIB: CPT | Mod: GC | Performed by: INTERNAL MEDICINE

## 2023-12-21 PROCEDURE — 36415 COLL VENOUS BLD VENIPUNCTURE: CPT | Performed by: NURSE PRACTITIONER

## 2023-12-21 PROCEDURE — 258N000003 HC RX IP 258 OP 636

## 2023-12-21 PROCEDURE — 85347 COAGULATION TIME ACTIVATED: CPT | Mod: 91

## 2023-12-21 PROCEDURE — 370N000017 HC ANESTHESIA TECHNICAL FEE, PER MIN: Performed by: INTERNAL MEDICINE

## 2023-12-21 PROCEDURE — 250N000011 HC RX IP 250 OP 636

## 2023-12-21 PROCEDURE — 85027 COMPLETE CBC AUTOMATED: CPT | Performed by: NURSE PRACTITIONER

## 2023-12-21 PROCEDURE — 250N000009 HC RX 250: Performed by: INTERNAL MEDICINE

## 2023-12-21 PROCEDURE — C1894 INTRO/SHEATH, NON-LASER: HCPCS | Performed by: INTERNAL MEDICINE

## 2023-12-21 RX ORDER — FENTANYL CITRATE 50 UG/ML
50 INJECTION, SOLUTION INTRAMUSCULAR; INTRAVENOUS EVERY 5 MIN PRN
Status: DISCONTINUED | OUTPATIENT
Start: 2023-12-21 | End: 2023-12-21 | Stop reason: HOSPADM

## 2023-12-21 RX ORDER — DILTIAZEM HYDROCHLORIDE 30 MG/1
30 TABLET, FILM COATED ORAL 3 TIMES DAILY PRN
Status: DISCONTINUED | OUTPATIENT
Start: 2023-12-21 | End: 2023-12-21 | Stop reason: HOSPADM

## 2023-12-21 RX ORDER — PROPOFOL 10 MG/ML
INJECTION, EMULSION INTRAVENOUS PRN
Status: DISCONTINUED | OUTPATIENT
Start: 2023-12-21 | End: 2023-12-21

## 2023-12-21 RX ORDER — EPHEDRINE SULFATE 50 MG/ML
INJECTION, SOLUTION INTRAMUSCULAR; INTRAVENOUS; SUBCUTANEOUS PRN
Status: DISCONTINUED | OUTPATIENT
Start: 2023-12-21 | End: 2023-12-21

## 2023-12-21 RX ORDER — METHYLPREDNISOLONE SODIUM SUCCINATE 500 MG/8ML
INJECTION INTRAMUSCULAR; INTRAVENOUS PRN
Status: DISCONTINUED | OUTPATIENT
Start: 2023-12-21 | End: 2023-12-21

## 2023-12-21 RX ORDER — OXYCODONE AND ACETAMINOPHEN 5; 325 MG/1; MG/1
1 TABLET ORAL EVERY 4 HOURS PRN
Status: DISCONTINUED | OUTPATIENT
Start: 2023-12-21 | End: 2023-12-21 | Stop reason: HOSPADM

## 2023-12-21 RX ORDER — FENTANYL CITRATE 50 UG/ML
25 INJECTION, SOLUTION INTRAMUSCULAR; INTRAVENOUS EVERY 5 MIN PRN
Status: DISCONTINUED | OUTPATIENT
Start: 2023-12-21 | End: 2023-12-21 | Stop reason: HOSPADM

## 2023-12-21 RX ORDER — PROTAMINE SULFATE 10 MG/ML
INJECTION, SOLUTION INTRAVENOUS PRN
Status: DISCONTINUED | OUTPATIENT
Start: 2023-12-21 | End: 2023-12-21

## 2023-12-21 RX ORDER — NALOXONE HYDROCHLORIDE 0.4 MG/ML
0.4 INJECTION, SOLUTION INTRAMUSCULAR; INTRAVENOUS; SUBCUTANEOUS
Status: DISCONTINUED | OUTPATIENT
Start: 2023-12-21 | End: 2023-12-21 | Stop reason: HOSPADM

## 2023-12-21 RX ORDER — OXYCODONE HYDROCHLORIDE 10 MG/1
10 TABLET ORAL
Status: DISCONTINUED | OUTPATIENT
Start: 2023-12-21 | End: 2023-12-21 | Stop reason: HOSPADM

## 2023-12-21 RX ORDER — NALOXONE HYDROCHLORIDE 0.4 MG/ML
0.2 INJECTION, SOLUTION INTRAMUSCULAR; INTRAVENOUS; SUBCUTANEOUS
Status: DISCONTINUED | OUTPATIENT
Start: 2023-12-21 | End: 2023-12-21 | Stop reason: HOSPADM

## 2023-12-21 RX ORDER — ONDANSETRON 4 MG/1
4 TABLET, ORALLY DISINTEGRATING ORAL EVERY 30 MIN PRN
Status: DISCONTINUED | OUTPATIENT
Start: 2023-12-21 | End: 2023-12-21 | Stop reason: HOSPADM

## 2023-12-21 RX ORDER — ONDANSETRON 2 MG/ML
4 INJECTION INTRAMUSCULAR; INTRAVENOUS EVERY 30 MIN PRN
Status: DISCONTINUED | OUTPATIENT
Start: 2023-12-21 | End: 2023-12-21 | Stop reason: HOSPADM

## 2023-12-21 RX ORDER — FENTANYL CITRATE 50 UG/ML
INJECTION, SOLUTION INTRAMUSCULAR; INTRAVENOUS PRN
Status: DISCONTINUED | OUTPATIENT
Start: 2023-12-21 | End: 2023-12-21

## 2023-12-21 RX ORDER — ONDANSETRON 2 MG/ML
INJECTION INTRAMUSCULAR; INTRAVENOUS PRN
Status: DISCONTINUED | OUTPATIENT
Start: 2023-12-21 | End: 2023-12-21

## 2023-12-21 RX ORDER — HEPARIN SODIUM 1000 [USP'U]/ML
INJECTION, SOLUTION INTRAVENOUS; SUBCUTANEOUS PRN
Status: DISCONTINUED | OUTPATIENT
Start: 2023-12-21 | End: 2023-12-21

## 2023-12-21 RX ORDER — PHENYLEPHRINE HCL IN 0.9% NACL 50MG/250ML
.5-1.25 PLASTIC BAG, INJECTION (ML) INTRAVENOUS CONTINUOUS
Status: DISCONTINUED | OUTPATIENT
Start: 2023-12-21 | End: 2023-12-21

## 2023-12-21 RX ORDER — LIDOCAINE 40 MG/G
CREAM TOPICAL
Status: DISCONTINUED | OUTPATIENT
Start: 2023-12-21 | End: 2023-12-21 | Stop reason: HOSPADM

## 2023-12-21 RX ORDER — HYDROMORPHONE HCL IN WATER/PF 6 MG/30 ML
0.4 PATIENT CONTROLLED ANALGESIA SYRINGE INTRAVENOUS EVERY 5 MIN PRN
Status: DISCONTINUED | OUTPATIENT
Start: 2023-12-21 | End: 2023-12-21 | Stop reason: HOSPADM

## 2023-12-21 RX ORDER — SODIUM CHLORIDE, SODIUM LACTATE, POTASSIUM CHLORIDE, CALCIUM CHLORIDE 600; 310; 30; 20 MG/100ML; MG/100ML; MG/100ML; MG/100ML
INJECTION, SOLUTION INTRAVENOUS CONTINUOUS
Status: DISCONTINUED | OUTPATIENT
Start: 2023-12-21 | End: 2023-12-21 | Stop reason: HOSPADM

## 2023-12-21 RX ORDER — DEXAMETHASONE SODIUM PHOSPHATE 4 MG/ML
INJECTION, SOLUTION INTRA-ARTICULAR; INTRALESIONAL; INTRAMUSCULAR; INTRAVENOUS; SOFT TISSUE PRN
Status: DISCONTINUED | OUTPATIENT
Start: 2023-12-21 | End: 2023-12-21

## 2023-12-21 RX ORDER — OXYCODONE HYDROCHLORIDE 5 MG/1
5 TABLET ORAL
Status: DISCONTINUED | OUTPATIENT
Start: 2023-12-21 | End: 2023-12-21 | Stop reason: HOSPADM

## 2023-12-21 RX ORDER — SODIUM CHLORIDE, SODIUM LACTATE, POTASSIUM CHLORIDE, CALCIUM CHLORIDE 600; 310; 30; 20 MG/100ML; MG/100ML; MG/100ML; MG/100ML
INJECTION, SOLUTION INTRAVENOUS CONTINUOUS PRN
Status: DISCONTINUED | OUTPATIENT
Start: 2023-12-21 | End: 2023-12-21

## 2023-12-21 RX ORDER — SODIUM CHLORIDE 9 MG/ML
INJECTION, SOLUTION INTRAVENOUS CONTINUOUS
Status: DISCONTINUED | OUTPATIENT
Start: 2023-12-21 | End: 2023-12-21 | Stop reason: HOSPADM

## 2023-12-21 RX ORDER — HYDROMORPHONE HCL IN WATER/PF 6 MG/30 ML
0.2 PATIENT CONTROLLED ANALGESIA SYRINGE INTRAVENOUS EVERY 5 MIN PRN
Status: DISCONTINUED | OUTPATIENT
Start: 2023-12-21 | End: 2023-12-21 | Stop reason: HOSPADM

## 2023-12-21 RX ADMIN — PROPOFOL 30 MG: 10 INJECTION, EMULSION INTRAVENOUS at 07:59

## 2023-12-21 RX ADMIN — PHENYLEPHRINE HYDROCHLORIDE 100 MCG: 10 INJECTION INTRAVENOUS at 09:39

## 2023-12-21 RX ADMIN — DEXAMETHASONE SODIUM PHOSPHATE 4 MG: 4 INJECTION, SOLUTION INTRA-ARTICULAR; INTRALESIONAL; INTRAMUSCULAR; INTRAVENOUS; SOFT TISSUE at 08:15

## 2023-12-21 RX ADMIN — FENTANYL CITRATE 50 MCG: 50 INJECTION INTRAMUSCULAR; INTRAVENOUS at 08:50

## 2023-12-21 RX ADMIN — PROTAMINE SULFATE 25 MG: 10 INJECTION, SOLUTION INTRAVENOUS at 11:48

## 2023-12-21 RX ADMIN — PROTAMINE SULFATE 50 MG: 10 INJECTION, SOLUTION INTRAVENOUS at 11:34

## 2023-12-21 RX ADMIN — ROCURONIUM BROMIDE 20 MG: 50 INJECTION, SOLUTION INTRAVENOUS at 08:32

## 2023-12-21 RX ADMIN — FENTANYL CITRATE 50 MCG: 50 INJECTION INTRAMUSCULAR; INTRAVENOUS at 11:02

## 2023-12-21 RX ADMIN — PHENYLEPHRINE HYDROCHLORIDE 100 MCG: 10 INJECTION INTRAVENOUS at 11:25

## 2023-12-21 RX ADMIN — ROCURONIUM BROMIDE 20 MG: 50 INJECTION, SOLUTION INTRAVENOUS at 09:03

## 2023-12-21 RX ADMIN — HEPARIN SODIUM 12000 UNITS: 1000 INJECTION INTRAVENOUS; SUBCUTANEOUS at 09:23

## 2023-12-21 RX ADMIN — EPHEDRINE SULFATE 5 MG: 5 INJECTION INTRAVENOUS at 08:57

## 2023-12-21 RX ADMIN — PROPOFOL 120 MG: 10 INJECTION, EMULSION INTRAVENOUS at 07:58

## 2023-12-21 RX ADMIN — ONDANSETRON 4 MG: 2 INJECTION INTRAMUSCULAR; INTRAVENOUS at 11:34

## 2023-12-21 RX ADMIN — ROCURONIUM BROMIDE 20 MG: 50 INJECTION, SOLUTION INTRAVENOUS at 09:45

## 2023-12-21 RX ADMIN — EPHEDRINE SULFATE 5 MG: 5 INJECTION INTRAVENOUS at 09:15

## 2023-12-21 RX ADMIN — ROCURONIUM BROMIDE 20 MG: 50 INJECTION, SOLUTION INTRAVENOUS at 10:20

## 2023-12-21 RX ADMIN — PHENYLEPHRINE HYDROCHLORIDE 100 MCG: 10 INJECTION INTRAVENOUS at 11:13

## 2023-12-21 RX ADMIN — HEPARIN SODIUM 5000 UNITS: 1000 INJECTION INTRAVENOUS; SUBCUTANEOUS at 10:10

## 2023-12-21 RX ADMIN — PHENYLEPHRINE HYDROCHLORIDE 100 MCG: 10 INJECTION INTRAVENOUS at 10:33

## 2023-12-21 RX ADMIN — FENTANYL CITRATE 100 MCG: 50 INJECTION INTRAMUSCULAR; INTRAVENOUS at 07:58

## 2023-12-21 RX ADMIN — SUGAMMADEX 200 MG: 100 INJECTION, SOLUTION INTRAVENOUS at 11:51

## 2023-12-21 RX ADMIN — PHENYLEPHRINE HYDROCHLORIDE 100 MCG: 10 INJECTION INTRAVENOUS at 11:33

## 2023-12-21 RX ADMIN — ROCURONIUM BROMIDE 20 MG: 50 INJECTION, SOLUTION INTRAVENOUS at 11:04

## 2023-12-21 RX ADMIN — HEPARIN SODIUM 5000 UNITS: 1000 INJECTION INTRAVENOUS; SUBCUTANEOUS at 11:00

## 2023-12-21 RX ADMIN — PHENYLEPHRINE HYDROCHLORIDE 50 MCG: 10 INJECTION INTRAVENOUS at 09:25

## 2023-12-21 RX ADMIN — ROCURONIUM BROMIDE 50 MG: 50 INJECTION, SOLUTION INTRAVENOUS at 07:59

## 2023-12-21 RX ADMIN — ROCURONIUM BROMIDE 10 MG: 50 INJECTION, SOLUTION INTRAVENOUS at 10:00

## 2023-12-21 RX ADMIN — HEPARIN SODIUM 5000 UNITS: 1000 INJECTION INTRAVENOUS; SUBCUTANEOUS at 09:37

## 2023-12-21 RX ADMIN — ROCURONIUM BROMIDE 10 MG: 50 INJECTION, SOLUTION INTRAVENOUS at 09:24

## 2023-12-21 RX ADMIN — MIDAZOLAM 2 MG: 1 INJECTION INTRAMUSCULAR; INTRAVENOUS at 07:45

## 2023-12-21 RX ADMIN — PHENYLEPHRINE HYDROCHLORIDE 100 MCG: 10 INJECTION INTRAVENOUS at 10:43

## 2023-12-21 RX ADMIN — PROPOFOL 20 MG: 10 INJECTION, EMULSION INTRAVENOUS at 11:02

## 2023-12-21 RX ADMIN — HEPARIN SODIUM 5000 UNITS: 1000 INJECTION INTRAVENOUS; SUBCUTANEOUS at 11:18

## 2023-12-21 RX ADMIN — HEPARIN SODIUM 5000 UNITS: 1000 INJECTION INTRAVENOUS; SUBCUTANEOUS at 09:55

## 2023-12-21 RX ADMIN — SODIUM CHLORIDE, POTASSIUM CHLORIDE, SODIUM LACTATE AND CALCIUM CHLORIDE: 600; 310; 30; 20 INJECTION, SOLUTION INTRAVENOUS at 07:55

## 2023-12-21 RX ADMIN — METHYLPREDNISOLONE SODIUM SUCCINATE 125 MG: 500 INJECTION, POWDER, FOR SOLUTION INTRAMUSCULAR; INTRAVENOUS at 11:35

## 2023-12-21 RX ADMIN — PROPOFOL 30 MG: 10 INJECTION, EMULSION INTRAVENOUS at 08:32

## 2023-12-21 ASSESSMENT — ACTIVITIES OF DAILY LIVING (ADL)
ADLS_ACUITY_SCORE: 35

## 2023-12-21 ASSESSMENT — VISUAL ACUITY: OU: BASELINE;NORMAL ACUITY

## 2023-12-21 NOTE — ANESTHESIA CARE TRANSFER NOTE
Patient: Angie Lopes    Procedure: Procedure(s):  EP Ablation Focal AFIB       Diagnosis: atrial fib  Diagnosis Additional Information: No value filed.    Anesthesia Type:   General     Note:    Oropharynx: oropharynx clear of all foreign objects and spontaneously breathing  Level of Consciousness: awake  Oxygen Supplementation: face mask  Level of Supplemental Oxygen (L/min / FiO2): 6  Independent Airway: airway patency satisfactory and stable  Dentition: dentition unchanged  Vital Signs Stable: post-procedure vital signs reviewed and stable  Report to RN Given: handoff report given  Patient transferred to: PACU    Handoff Report: Identifed the Patient, Identified the Reponsible Provider, Reviewed the pertinent medical history, Discussed the surgical course, Reviewed Intra-OP anesthesia mangement and issues during anesthesia, Set expectations for post-procedure period and Allowed opportunity for questions and acknowledgement of understanding      Vitals:  Vitals Value Taken Time   /75 12/21/23 1210   Temp     Pulse 76 12/21/23 1215   Resp 0 12/21/23 1215   SpO2 98 % 12/21/23 1215   Vitals shown include unfiled device data.    Electronically Signed By: BRENT Gregg CRNA  December 21, 2023  12:15 PM

## 2023-12-21 NOTE — Clinical Note
Potential access sites were evaluated for patency using ultrasound.   The left femoral artery, right femoral vein, and left femoral vein were selected. Access was obtained under with Sonosite and Fluoroscopic guidance using a micropuncture 21 gauge needle with direct visualization of needle entry.

## 2023-12-21 NOTE — TELEPHONE ENCOUNTER
"Received a call from the call center to discuss groin site bleeding after ablation today.  Spoke to Spouse Dilcia, who says they just got home from having an ablation today. She says he started having blood running down his leg from his right groin site, and the dressing is saturated but intact. She says he is currently laying on the couch with a towel applying pressure to the site. She would like a return call with further directions.  Advised a message will be sent to Dr. Chavarria team for follow up.  Please call Dilcia cell [phone].    1. REASON FOR CALL or QUESTION: \"What is your reason for calling today?\" or \"How can I best help you?\" or \"What question do you have that I can help answer?\"  "

## 2023-12-21 NOTE — ANESTHESIA PROCEDURE NOTES
Airway       Patient location during procedure: OR       Procedure Start/Stop Times: 12/21/2023 8:01 AM  Staff -        CRNA: Tomasz Witt APRN CRNA       Performed By: CRNAIndications and Patient Condition       Indications for airway management: kiran-procedural       Induction type:intravenous       Mask difficulty assessment: 1 - vent by mask    Final Airway Details       Final airway type: endotracheal airway       Successful airway: ETT - single  Endotracheal Airway Details        ETT size (mm): 8.0       Cuffed: yes       Successful intubation technique: direct laryngoscopy       DL Blade Type: Goldman 2       Grade View of Cords: 1       Adjucts: stylet       Position: Right       Measured from: gums/teeth       Secured at (cm): 24       Bite block used: None    Post intubation assessment        Number of attempts at approach: 1       Number of other approaches attempted: 0       Secured with: pink tape       Ease of procedure: easy       Dentition: Intact and Unchanged    Medication(s) Administered   Medication Administration Time: 12/21/2023 8:01 AM

## 2023-12-21 NOTE — ANESTHESIA PREPROCEDURE EVALUATION
Anesthesia Pre-Procedure Evaluation    Patient: Angie Lopes   MRN: [id number] : 1975        Procedure : Procedure(s):  EP Ablation Focal AFIB          History reviewed. No pertinent past medical history.   Past Surgical History:   Procedure Laterality Date    ANESTHESIA CARDIOVERSION N/A 2023    Procedure: Anesthesia cardioversion@0900;  Surgeon: GENERIC ANESTHESIA PROVIDER;  Location: [facility] OR    VASECTOMY        No Known Allergies   Social History     Tobacco Use    Smoking status: Never     Passive exposure: Past    Smokeless tobacco: Never   Substance Use Topics    Alcohol use: Yes     Comment: occ. wine 3-4 times a week       Wt Readings from Last 1 Encounters:   23 100.1 kg (220 lb 10.9 oz)        Anesthesia Evaluation   Pt has had prior anesthetic. Type: General and MAC.        ROS/MED HX  ENT/Pulmonary:       Neurologic:       Cardiovascular:     (+)  - -   -  - -                          Irregular Heartbeat/Palpitations,            METS/Exercise Tolerance:  Comment: Paroxysmal AFib   Hematologic:       Musculoskeletal:       GI/Hepatic: Comment: History of GI Bleed      Renal/Genitourinary:       Endo:       Psychiatric/Substance Use:       Infectious Disease:       Malignancy:       Other:            Physical Exam    Airway  airway exam normal      Mallampati: I   TM distance: > 3 FB   Neck ROM: full   Mouth opening: > 3 cm    Respiratory Devices and Support         Dental       (+) Completely normal teeth      Cardiovascular   cardiovascular exam normal          Pulmonary   pulmonary exam normal                OUTSIDE LABS:  CBC:   Lab Results   Component Value Date    WBC 5.7 2023    WBC 7.0 11/15/2021    HGB 15.1 2023    HGB 15.3 11/15/2021    HCT 47.3 2023    HCT 48.8 11/15/2021     2023     11/15/2021     BMP:   Lab Results   Component Value Date     2023     11/15/2021    POTASSIUM 4.1 2023    POTASSIUM 4.1  "11/15/2021    CHLORIDE 106 09/08/2023    CHLORIDE 106 11/15/2021    CO2 25 09/08/2023    CO2 26 11/15/2021    BUN 14.6 09/08/2023    BUN 17 11/15/2021    CR 1.44 (H) 09/08/2023    CR 1.24 11/15/2021    GLC 97 09/08/2023    GLC 88 11/15/2021     COAGS:   Lab Results   Component Value Date    INR 1.02 10/21/2021     POC: No results found for: \"BGM\", \"HCG\", \"HCGS\"  HEPATIC:   Lab Results   Component Value Date    ALBUMIN 4.0 11/15/2021    PROTTOTAL 7.7 11/15/2021    ALT 33 11/15/2021    AST 16 11/15/2021    ALKPHOS 77 11/15/2021    BILITOTAL 0.6 11/15/2021     OTHER:   Lab Results   Component Value Date    JAKE 9.6 09/08/2023    MAG 2.1 09/08/2023    TSH 1.53 11/15/2021       Anesthesia Plan    ASA Status:  3    NPO Status:  NPO Appropriate    Anesthesia Type: General.     - Airway: ETT   Induction: Intravenous.   Maintenance: Balanced.        Consents    Anesthesia Plan(s) and associated risks, benefits, and realistic alternatives discussed. Questions answered and patient/representative(s) expressed understanding.     - Discussed: Risks, Benefits and Alternatives for BOTH SEDATION and the PROCEDURE were discussed     - Discussed with:  Patient      - Extended Intubation/Ventilatory Support Discussed: No.      - Patient is DNR/DNI Status: No     Use of blood products discussed: No .     Postoperative Care    Pain management: IV analgesics, Oral pain medications.   PONV prophylaxis: Ondansetron (or other 5HT-3)     Comments:               Margret Griffiths MD    I have reviewed the pertinent notes and labs in the chart from the past 30 days and (re)examined the patient.  Any updates or changes from those notes are reflected in this note.            # Drug Induced Coagulation Defect: home medication list includes an anticoagulant medication   # Overweight: Estimated body mass index is 27.58 kg/m  as calculated from the following:    Height as of this encounter: 1.905 m (6' 3\").    Weight as of this encounter: 100.1 kg (220 lb " 10.9 oz).

## 2023-12-21 NOTE — ANESTHESIA POSTPROCEDURE EVALUATION
Patient: Angie Lopes    Procedure: Procedure(s):  EP Ablation Focal AFIB       Anesthesia Type:  General    Note:  Disposition: Outpatient   Postop Pain Control: Uneventful            Sign Out: Well controlled pain   PONV: No   Neuro/Psych: Uneventful            Sign Out: Acceptable/Baseline neuro status   Airway/Respiratory: Uneventful            Sign Out: Acceptable/Baseline resp. status   CV/Hemodynamics: Uneventful            Sign Out: Acceptable CV status; No obvious hypovolemia; No obvious fluid overload   Other NRE: NONE   DID A NON-ROUTINE EVENT OCCUR? No           Last vitals:  Vitals Value Taken Time   /80 12/21/23 1315   Temp 36.6  C (97.9  F) 12/21/23 1210   Pulse 77 12/21/23 1318   Resp 0 12/21/23 1318   SpO2 98 % 12/21/23 1318   Vitals shown include unfiled device data.    Electronically Signed By: Aneesh Putnam MD  December 21, 2023  1:19 PM

## 2023-12-21 NOTE — PROGRESS NOTES
Patient tolerated recovery stage well. VSS, bilateral groin site clean/dry/intact, no hematoma, and denies pain. Patient tolerated PO food and fluids. Teaching was done and discharge instructions were given. Patient ambulated, voided, and PIV was removed. Patient discharged from the hospital via wheel chair to home with family.

## 2023-12-21 NOTE — H&P
Electrophysiology Pre-Procedure History and Physical    Angie Lopes MRN# [id number]   Age: 48 year old YOB: 1975      Date of Procedure: 12/21/2023  [facility]      Date of Exam 12/21/2023 Facility (Same day)       HPI:  Mr. Lopes is a 48 year old male who has a past medical history significant for PAF (CHADSVASC 0) and GIB s/p polypectomy.      He reports having symptoms of palpitations for last 5 years. He states he would have intermittent palpitations lasting up to 30 minutes and then self resolving. Then in 8/2021, he started having increasing frequency of symptoms. He reports in 6/2021 he had GIB and had polyp removed. Of note, his Hgb is slowly downtredning again. He then presented to [facility] on 10/21/21 with a 5 day history of palpitation and orthostatic dizziness. He was found to be in AF. He was started on Eliquis and Diltiazem. A prior zio patch showed 3% AF burden and occasional ectopy. Pt does report he did not have as much A Fib when the monitor was on. He has netomat luana and has had a number of episodes.Discussed role of AAT and/or ablation. He elected to pursue ablation for which he presents today.        Active problem list:     Patient Active Problem List    Diagnosis Date Noted    S/P vasectomy 08/24/2012     Priority: Medium     2004      External hemorrhoids 08/24/2012     Priority: Medium    CARDIOVASCULAR SCREENING; LDL GOAL LESS THAN 160 05/09/2010     Priority: Medium            Medications (include herbals and vitamins):      Current Facility-Administered Medications   Medication    lidocaine (LMX4) cream    lidocaine 1 % 0.1-1 mL    sodium chloride (PF) 0.9% PF flush 3 mL    sodium chloride (PF) 0.9% PF flush 3 mL    sodium chloride (PF) 0.9% PF flush 3 mL    sodium chloride 0.9 % infusion           Medication List      There are no discharge medications for this visit.              Allergies:    No Known Allergies          " Social History:     Social History     Tobacco Use    Smoking status: Never     Passive exposure: Past    Smokeless tobacco: Never   Substance Use Topics    Alcohol use: Yes     Comment: occ. wine 3-4 times a week             Physical Exam:   All vitals have been reviewed  Patient Vitals for the past 8 hrs:   BP Temp Temp src Pulse Resp SpO2 Height Weight   12/21/23 0620 (!) 143/93 98.1  F (36.7  C) Oral 62 16 100 % -- --   12/21/23 0551 -- -- -- -- -- -- 1.905 m (6' 3\") 100.1 kg (220 lb 10.9 oz)     No intake/output data recorded.  Airway assessment:   Patient is able to open mouth wide  Patient is able to stick out tongue}      ENT:   Normocephalic, without obvious abnormality, atraumatic, sinuses nontender on palpation, external ears without lesions, oral pharynx with moist mucous membranes, tonsils without erythema or exudates, gums normal and good dentition.     Neck:   Supple, symmetrical, trachea midline, no adenopathy, thyroid symmetric, not enlarged and no tenderness, skin normal     Lungs:   No increased work of breathing, good air exchange, clear to auscultation bilaterally, no crackles or wheezing     Cardiovascular:   Normal apical impulse, regular rate and rhythm, normal S1 and S2, no S3 or S4, and no murmur noted             Lab / Radiology Results:     Lab Results   Component Value Date    WBC 5.7 09/08/2023    WBC 4.7 06/11/2021    RBC 5.88 09/08/2023    RBC 5.26 06/11/2021    HGB 15.1 09/08/2023    HGB 13.3 06/11/2021    HCT 47.3 09/08/2023    HCT 41.8 06/11/2021    MCV 80 09/08/2023    MCV 80 06/11/2021    RDW 14.4 09/08/2023    RDW 13.9 06/11/2021     09/08/2023     06/11/2021      Lab Results   Component Value Date    WBC 5.7 09/08/2023    WBC 4.7 06/11/2021     Lab Results   Component Value Date     09/08/2023     06/11/2021     Lab Results   Component Value Date    HGB 15.1 09/08/2023    HGB 13.3 06/11/2021    HCT 47.3 09/08/2023    HCT 41.8 06/11/2021     Lab " Results   Component Value Date     09/08/2023     06/11/2021    CO2 25 09/08/2023    CO2 26 11/15/2021    CO2 28 06/11/2021    BUN 14.6 09/08/2023    BUN 17 11/15/2021    BUN 16 06/11/2021     Lab Results   Component Value Date     09/08/2023     06/11/2021    CO2 25 09/08/2023    CO2 26 11/15/2021    CO2 28 06/11/2021    BUN 14.6 09/08/2023    BUN 17 11/15/2021    BUN 16 06/11/2021     Lab Results   Component Value Date    TSH 1.53 11/15/2021    TSH 2.10 06/11/2021             Plan:   Patient's active problems diagnostically and therapeutically optimized for the planned procedure. Patient here for AF ablation. Procedure explained in detail to patient including indications, risks, and benefits. The procedural risk of EP study and ablation were discussed in detail. Risks discussed include: vascular complications, CVA, AVB, pericardial effusion and tamponade, esophageal fistula, PV stenosis. AF ablation has 70% success at 1 year, 50% success at 5 years, and 30% need another ablation.  Even if ablation is successful anticoagulation may be needed lifelong. Patient states understanding and wishes to procced.     BRENT Bella CNP  Electrophysiology Consult Service  Pager: Text Page

## 2023-12-21 NOTE — TELEPHONE ENCOUNTER
I notified Flora RODRIGUEZ RN for Dr Chavarria regarding this message and will forward this encounter for further review.    Cody MIMS

## 2023-12-21 NOTE — DISCHARGE INSTRUCTIONS
Post-Ablation Discharge Instructions    Care of groin site:        Remove the Band-Aid after 24 hours. If there is minor oozing, apply another Band-aid and remove it after 12 hours.         Do NOT take a bath, use a hot tub, pool, or submerse in water for at least 3 days You may shower.         It is normal to have a small bruise or lump at the site.        Do not scrub the site.        Do not use lotion or powder near the puncture site for 3 days.        For the first 2 days: Do not stoop or squat. When you cough, sneeze or move your bowels, hold your hand over the puncture site and press gently.        Do not lift more than 10 pounds or exertional activity for 10 days.      If you start bleeding from the site in your groin:  Lie down flat and press firmly on the site.  Call your physician immediately, or, come to the emergency room.    Call 911 right away if you have bleeding that is heavy or does not stop.     Call your doctor/provider if:        You have a large or growing hard lump around the site.        The site is red, swollen, hot or tender.        Blood or fluid is draining from the site.        You have chills or a fever greater than 101 F (38 C).        Your leg or arm turns bluish, feels numb or cool.        You have hives, a rash or unusual itching.     Cardiovascular Clinic:   [address] [place] [address]. [place] [address]    Your Care Team:  EP Cardiology   Telephone Number     Nurses:   Flora JARA [phone]    After business hours: [phone], select option 4, and ask for EP Fellow on-call to be paged.   Non-procedure scheduling:    Manda ZEPEDA   [phone]   Procedure scheduling:    Macy Espinoza   [phone]   Device Clinic (Pacemakers, ICDs, Loop Recorders)    During business hours: [phone]  After business hours:   [phone]- select option 4 and ask for job code [id number].       Anesthesia Discharge Instructions:     Have an adult stay with you for 24 hours.  Drink  plenty of fluids.  You may eat your normal diet, unless your doctor tells you otherwise.  For 24 hours:  Relax and take it easy.  Do NOT smoke.  Do NOT make any important or legal decisions.  Do NOT drive or operate machines at home or at work.  Do NOT drink alcohol.

## 2023-12-21 NOTE — OR NURSING
Paged  Dr. Chavarria patient needs H&P @ 0500. Belen Morris talking to patient at bedside, also notified her patient still needs H&P and had no labs or EKG ordered.

## 2023-12-22 NOTE — PROGRESS NOTES
Reviewed triage note and called patient's spouse. He noticed both groin sites were bleeding once he walked into his home after AF ablation today. He lied flat and spouse applied pressure and the bleeding has since stopped.     Writer reviewed with Belen Santos NP. Recommendation to place pressure dressings and can remove tomorrow to reassess sites. If continued bleeding or evidence of hematoma tomorrow, patient will contact us at the clinic. Should present to the ER if continued bleeding/oozing despite placing pressure for 1 hour.    Patient's spouse verbalized understanding and is in agreement to the plan.

## 2023-12-22 NOTE — ADDENDUM NOTE
Addendum  created 12/22/23 0847 by Margret Griffiths MD    Attestation recorded in Intraprocedure, Intraprocedure Attestations filed

## 2023-12-26 LAB
ATRIAL RATE - MUSE: 61 BPM
ATRIAL RATE - MUSE: 79 BPM
DIASTOLIC BLOOD PRESSURE - MUSE: NORMAL MMHG
DIASTOLIC BLOOD PRESSURE - MUSE: NORMAL MMHG
INTERPRETATION ECG - MUSE: NORMAL
INTERPRETATION ECG - MUSE: NORMAL
P AXIS - MUSE: 49 DEGREES
P AXIS - MUSE: 50 DEGREES
PR INTERVAL - MUSE: 180 MS
PR INTERVAL - MUSE: 180 MS
QRS DURATION - MUSE: 90 MS
QRS DURATION - MUSE: 96 MS
QT - MUSE: 396 MS
QT - MUSE: 418 MS
QTC - MUSE: 420 MS
QTC - MUSE: 454 MS
R AXIS - MUSE: -11 DEGREES
R AXIS - MUSE: -22 DEGREES
SYSTOLIC BLOOD PRESSURE - MUSE: NORMAL MMHG
SYSTOLIC BLOOD PRESSURE - MUSE: NORMAL MMHG
T AXIS - MUSE: 17 DEGREES
T AXIS - MUSE: 34 DEGREES
VENTRICULAR RATE- MUSE: 61 BPM
VENTRICULAR RATE- MUSE: 79 BPM

## 2024-03-20 NOTE — PROGRESS NOTES
ELECTROPHYSIOLOGY CLINIC VISIT  Assessment/Recommendations   Assessment/Plan:      Mr. Lopes is a 48 year old male who has a past medical history significant for PAF (CHADSVASC 0) s/p PVI 23 and GIB s/p polypectomy.     Paroxsymal Atrial Fibrillation:   We discussed in detail with the patient management/treatment options for A.fib includin. Stroke Prophylaxis:  CHADSVASC=  0, corresponding to a 0.2-0.5% annual stroke / systemic emolism event rate. indicating NO need for long term oral anticoagulation. He can stop Eliquis now that he is 3 months post ablation.   2. Rate Control: PRN diltiazem for palpitations  3. Rhythm Control: Cardioversion, Antiarrhythmics and/or ablation are options for rhythm control. He was scheduled for DCCV; however, he self converted and it was cancelled. A prior zio patch showed 3% AF burden and occasional ectopy. Pt does report he did not have as much A Fib when the monitor was on. He has [employer] luana and has had a number of episodes.Discussed role of AAT and/or ablation. Discussed each option in detail including indications, risks, and benefits in detail.He wanted to pursue ablation which he had PVI on 23. He is doing well post ablation.   4. Risk Factor Management: BP control, and continue MERA evaluation/treatment.     Follow up in 2024.        History of Present Illness/Subjective    Mr. Angie Lopes is a 48 year old male who comes in today for EP consultation of PAF.    Mr. Lopes is a 48 year old male who has a past medical history significant for PAF (CHADSVASC 0) s/p PVI 23 and GIB s/p polypectomy.     He reports having symptoms of palpitations for last 5 years. He states he would have intermittent palpitations lasting up to 30 minutes and then self resolving. Then in 2021, he started having increasing frequency of symptoms. He reports in 2021 he had GIB and had polyp removed. Of note, his Hgb is slowly downtredning again. He then presented  to [facility] on 10/21[facility]21 with a 5 day history of palpitation and orthostatic dizziness. He was found to be in AF. He was started on Eliquis and Diltiazem and arranged for close EP clinic visit.     EP Visit 10/22/21: He presents today to establish care.  Patient denies any significant drug use or alcoholism.  Patient appears to be in excellent health but denies any special  diets. He reports feeling that he is out of AF now.  He reports feeling interrupted sleep, never had a sleep evaluation. He denies any melena or bright blood per rectum. He denies chest discomfort, palpitations, abdominal fullness/bloating or peripheral edema, shortness of breath, paroxysmal nocturnal dyspnea, orthopnea, lightheadedness, dizziness, pre-syncope, or syncope. Echo today shows normal structure and function and does show he is now in SR. Current cardiac medications include: Eliquis and Diltiazem.     EP Visit: 2/16/22: He presents today for follow up. He recently saw sleep medicine who plans for sleep study. He reports feeling OK. He stopped caffeine at end of 12/2021. He does continue to have palpitations lasting up to several hours at a time. He denies chest discomfort,  abdominal fullness/bloating or peripheral edema, shortness of breath, paroxysmal nocturnal dyspnea, orthopnea, lightheadedness, dizziness, pre-syncope, or syncope. A zio patch monitor from 1/4/22-1/17/22 showed 6% PAF burden up to 18 hours 41 minutes and 60 NSVT that are likely AF with aberrant conduction. 10 symptom activations 2 showed AF and 8 showed sinus. Presenting 12 lead ECG shows SB Vent Rate 56 bpm,  ms, QRS 88 ms, QTc 409 ms. Current cardiac medications include: Diltiazem.     EP Visit 2/15/23: He presents for follow up. Since last visit he wrote in with an increase in symptoms and severity in December. [place] was in [place] to his father who was passing. Reports at this time he was having the A Fib for 6-7 days in a row. When he takes the  diltiazem, reports relief of symptoms in 1-2 hours. The longest episode was about 48 hours. With the A fib episodes he has chest discomfort, palpitations and dyspnea. Zio was completed on 1/8-1/22 which showed 3% AF burden, symptoms correlated with sinus, ectopy and AF. Reports he felt he had a lesser amount of A fib then normal when zio was on. He denies chest discomfort, peripheral edema, pre-syncope, or syncope. Presenting 12 lead ECG shows SR Vent Rate 59 bpm,  ms, QRS 84 ms, QTc 413 ms. Current cardiac medications include: Diltiazem.     EP Visit 7/5/23; He presents today for follow up. He stated CPAP in 4/2023.  A zio patch from 6/2023 reports in progress still. He reports feeling intermittent AF episodes reported on Kardia 2/15-2/18, 3/9-3/11, 3/21, 3/28-3/30, 4/7, 4/15, 6/8, 6/14, 6/17, 6/19-6/22. He denies chest discomfort, palpitations, abdominal fullness/bloating or peripheral edema, shortness of breath, paroxysmal nocturnal dyspnea, orthopnea, lightheadedness, dizziness, pre-syncope, or syncope. Presenting 12 lead ECG shows NSR Vent Rate 61 bpm,  ms, QRS 88 ms, QTc 412 ms. Current cardiac medications include: Diltiazem.     EP Visit 11/15/23: He presents today for follow up. He had DCCV 9/5/23. He reported he wants to pursue PVI which is set up for 12/21/23. He reports feeling at baseline. He continues to have intermittent AF episdoes. He denies chest discomfort, abdominal fullness/bloating or peripheral edema, shortness of breath, paroxysmal nocturnal dyspnea, orthopnea, lightheadedness, dizziness, pre-syncope, or syncope. Presenting 12 lead ECG shows NSR Vent Rate 61 bpm,  ms, QRS 88 ms, QTc 414 ms. Current cardiac medications include: Diltiazem and Eliquis.     He presents today for follow up. He had a PVI on 12/21/23. Groin sites CDI. He reports feeling well. No further AF. He notes some episodes of skipped beats like PVCs at night that have been annoying. He is back to activity and  feeling well, has not been exercising as much as he wants. He denies abdominal fullness/bloating or peripheral edema, shortness of breath, paroxysmal nocturnal dyspnea, orthopnea, lightheadedness, dizziness, pre-syncope, or syncope. Presenting 12 lead ECG shows NSR Vent Rate 73 bpm,  ms, QRS 84 ms, QTc 427 ms. Current cardiac medications include: Diltiazem and Eliquis.       I have reviewed and updated the patient's Past Medical History, Social History, Family History and Medication List.     Cardiographics (Personally Reviewed) :   10/22/21 Echo:   Interpretation Summary  Global and regional left ventricular function is normal with an EF of 60-65%.  Right ventricular function, chamber size, wall motion, and thickness are normal.  The inferior vena cava is normal.  No pericardial effusion is present.  There is no prior study for direct comparison.    12/20/23 CMR:    1. The left ventricle is normal in cavity size and wall thickness. There are no regional wall motion  abnormalities. The global systolic function is normal. The LVEF is 70%.     2. The right ventricle is normal in cavity size. The global systolic function is normal. The RVEF is 55%.      3. Both atria are normal in size.     4. There is no significant valvular disease.      5. There is no late gadolinium enhancement in the ventricles to indicate fibrosis or infiltrative disease.      6. There is no pericardial effusion.     7. There is no intracardiac thrombus.     MR Pulmonary veins     1. Four pulmonary veins are connected to the left atrium with no evidence of stenosis.      CONCLUSIONS:   Normal biventricular size and function, LVEF 70% and RVEF 55%.  No intracardiac thrombus.   No evidence of myocardial fibrosis.   Four pulmonary veins seen connected to the left atrium.       Physical Examination   Wt 102.7 kg (226 lb 6.4 oz)   BMI 28.30 kg/m    Wt Readings from Last 3 Encounters:   12/21/23 100.1 kg (220 lb 10.9 oz)   11/15/23 102.5 kg (225  lb 14.4 oz)   07/05/23 101.1 kg (222 lb 14.4 oz)     General Appearance:   Alert, well-appearing and in no acute distress.   HEENT: Atraumatic, normocephalic. MMM.   Chest/Lungs:   Respirations unlabored.  Lungs are clear to auscultation.   Cardiovascular:   Regular rate and rhythm.  S1/S2. No murmur.    Abdomen:  Soft, nontender, nondistended.   Extremities: No cyanosis or clubbing. No edema.    Musculoskeletal: Moves all extremities.  Gait normal.   Skin: Warm, dry, intact.    Neurologic: Mood and affect are appropriate.  Alert and oriented to person, place, time, and situation.            Medications  Allergies   Current Outpatient Medications   Medication Sig Dispense Refill    apixaban ANTICOAGULANT (ELIQUIS) 5 MG tablet Take 1 tablet (5 mg) by mouth 2 times daily 180 tablet 0    apixaban ANTICOAGULANT (ELIQUIS) 5 MG tablet Take 1 tablet (5 mg) by mouth 2 times daily 60 tablet 0    diltiazem (CARDIZEM) 30 MG tablet Take 1 tablet (30 mg) by mouth 3 times daily as needed (with AF episode) 50 tablet 0    Misc Natural Products (RELAXMAX) POWD       No Known Allergies      Lab Results (Personally Reviewed)    Chemistry/lipid CBC Cardiac Enzymes/BNP/TSH/INR   Lab Results   Component Value Date    BUN 16.3 12/21/2023     12/21/2023    CO2 26 12/21/2023     Creatinine   Date Value Ref Range Status   12/21/2023 1.25 (H) 0.67 - 1.17 mg/dL Final   06/11/2021 1.18 0.66 - 1.25 mg/dL Final       Lab Results   Component Value Date    CHOL 172 06/11/2021    HDL 66 06/11/2021    LDL 97 06/11/2021      Lab Results   Component Value Date    WBC 5.2 12/21/2023    HGB 13.6 12/21/2023    HCT 43.1 12/21/2023    MCV 81 12/21/2023     12/21/2023    Lab Results   Component Value Date    TSH 1.53 11/15/2021    INR 1.02 10/21/2021        The patient states understanding and is agreeable with the plan.   BRENT Bella CNP  Electrophysiology Consult Service  Securely message with [employer] page via [employer]  Paging/Directory

## 2024-03-22 ENCOUNTER — OFFICE VISIT (OUTPATIENT)
Dept: CARDIOLOGY | Facility: CLINIC | Age: 49
End: 2024-03-22
Attending: NURSE PRACTITIONER
Payer: COMMERCIAL

## 2024-03-22 VITALS — BODY MASS INDEX: 28.3 KG/M2 | WEIGHT: 226.4 LBS

## 2024-03-22 DIAGNOSIS — I48.0 PAF (PAROXYSMAL ATRIAL FIBRILLATION) (H): Primary | ICD-10-CM

## 2024-03-22 PROCEDURE — 99214 OFFICE O/P EST MOD 30 MIN: CPT | Performed by: NURSE PRACTITIONER

## 2024-03-22 PROCEDURE — 93010 ELECTROCARDIOGRAM REPORT: CPT | Performed by: INTERNAL MEDICINE

## 2024-03-22 PROCEDURE — 99213 OFFICE O/P EST LOW 20 MIN: CPT | Performed by: NURSE PRACTITIONER

## 2024-03-22 PROCEDURE — 93005 ELECTROCARDIOGRAM TRACING: CPT

## 2024-03-22 ASSESSMENT — PAIN SCALES - GENERAL: PAINLEVEL: NO PAIN (0)

## 2024-03-22 NOTE — NURSING NOTE
Chief Complaint   Patient presents with    Follow Up     3 mo follow-up PVI       Vitals were taken, medications reconciled and EKG performed.    Laureano Mcguire, Facilitator  1:45 PM

## 2024-03-22 NOTE — LETTER
3/22/2024      RE: Angie Lopes  [address]  [facility] [address]       Dear Colleague,    Thank you for the opportunity to participate in the care of your patient, Angie Lopes, at the [facility] [place] [facility]. Please see a copy of my visit note below.        ELECTROPHYSIOLOGY CLINIC VISIT  Assessment/Recommendations   Assessment/Plan:      Mr. Lopes is a 48 year old male who has a past medical history significant for PAF (CHADSVASC 0) s/p PVI 23 and GIB s/p polypectomy.     Paroxsymal Atrial Fibrillation:   We discussed in detail with the patient management/treatment options for A.fib includin. Stroke Prophylaxis:  CHADSVASC=  0, corresponding to a 0.2-0.5% annual stroke / systemic emolism event rate. indicating NO need for long term oral anticoagulation. He can stop Eliquis now that he is 3 months post ablation.   2. Rate Control: PRN diltiazem for palpitations  3. Rhythm Control: Cardioversion, Antiarrhythmics and/or ablation are options for rhythm control. He was scheduled for DCCV; however, he self converted and it was cancelled. A prior zio patch showed 3% AF burden and occasional ectopy. Pt does report he did not have as much A Fib when the monitor was on. He has [employer] luana and has had a number of episodes.Discussed role of AAT and/or ablation. Discussed each option in detail including indications, risks, and benefits in detail.He wanted to pursue ablation which he had PVI on 23. He is doing well post ablation.   4. Risk Factor Management: BP control, and continue MERA evaluation/treatment.     Follow up in 2024.        History of Present Illness/Subjective    Mr. Angie Lopes is a 48 year old male who comes in today for EP consultation of PAF.    Mr. Lopes is a 48 year old male who has a past medical history significant for PAF (CHADSVASC 0) s/p PVI 23 and GIB s/p polypectomy.     He  reports having symptoms of palpitations for last 5 years. He states he would have intermittent palpitations lasting up to 30 minutes and then self resolving. Then in 8/2021, he started having increasing frequency of symptoms. He reports in 6/2021 he had GIB and had polyp removed. Of note, his Hgb is slowly downtredning again. He then presented to [facility] on 10/21/21 with a 5 day history of palpitation and orthostatic dizziness. He was found to be in AF. He was started on Eliquis and Diltiazem and arranged for close EP clinic visit.     EP Visit 10/22/21: He presents today to establish care.  Patient denies any significant drug use or alcoholism.  Patient appears to be in excellent health but denies any special  diets. He reports feeling that he is out of AF now.  He reports feeling interrupted sleep, never had a sleep evaluation. He denies any melena or bright blood per rectum. He denies chest discomfort, palpitations, abdominal fullness/bloating or peripheral edema, shortness of breath, paroxysmal nocturnal dyspnea, orthopnea, lightheadedness, dizziness, pre-syncope, or syncope. Echo today shows normal structure and function and does show he is now in SR. Current cardiac medications include: Eliquis and Diltiazem.     EP Visit: 2/16/22: He presents today for follow up. He recently saw sleep medicine who plans for sleep study. He reports feeling OK. He stopped caffeine at end of 12/2021. He does continue to have palpitations lasting up to several hours at a time. He denies chest discomfort,  abdominal fullness/bloating or peripheral edema, shortness of breath, paroxysmal nocturnal dyspnea, orthopnea, lightheadedness, dizziness, pre-syncope, or syncope. A zio patch monitor from 1/4/22-1/17/22 showed 6% PAF burden up to 18 hours 41 minutes and 60 NSVT that are likely AF with aberrant conduction. 10 symptom activations 2 showed AF and 8 showed sinus. Presenting 12 lead ECG shows SB Vent Rate 56 bpm,  ms,  QRS 88 ms, QTc 409 ms. Current cardiac medications include: Diltiazem.     EP Visit 2/15/23: He presents for follow up. Since last visit he wrote in with an increase in symptoms and severity in December. [place] was in [place] to his father who was passing. Reports at this time he was having the A Fib for 6-7 days in a row. When he takes the diltiazem, reports relief of symptoms in 1-2 hours. The longest episode was about 48 hours. With the A fib episodes he has chest discomfort, palpitations and dyspnea. Zio was completed on 1/8-1/22 which showed 3% AF burden, symptoms correlated with sinus, ectopy and AF. Reports he felt he had a lesser amount of A fib then normal when zio was on. He denies chest discomfort, peripheral edema, pre-syncope, or syncope. Presenting 12 lead ECG shows SR Vent Rate 59 bpm,  ms, QRS 84 ms, QTc 413 ms. Current cardiac medications include: Diltiazem.     EP Visit 7/5/23; He presents today for follow up. He stated CPAP in 4/2023.  A zio patch from 6/2023 reports in progress still. He reports feeling intermittent AF episodes reported on Kardia 2/15-2/18, 3/9-3/11, 3/21, 3/28-3/30, 4/7, 4/15, 6/8, 6/14, 6/17, 6/19-6/22. He denies chest discomfort, palpitations, abdominal fullness/bloating or peripheral edema, shortness of breath, paroxysmal nocturnal dyspnea, orthopnea, lightheadedness, dizziness, pre-syncope, or syncope. Presenting 12 lead ECG shows NSR Vent Rate 61 bpm,  ms, QRS 88 ms, QTc 412 ms. Current cardiac medications include: Diltiazem.     EP Visit 11/15/23: He presents today for follow up. He had DCCV 9/5/23. He reported he wants to pursue PVI which is set up for 12/21/23. He reports feeling at baseline. He continues to have intermittent AF episdoes. He denies chest discomfort, abdominal fullness/bloating or peripheral edema, shortness of breath, paroxysmal nocturnal dyspnea, orthopnea, lightheadedness, dizziness, pre-syncope, or syncope. Presenting 12 lead ECG  shows NSR Vent Rate 61 bpm,  ms, QRS 88 ms, QTc 414 ms. Current cardiac medications include: Diltiazem and Eliquis.     He presents today for follow up. He had a PVI on 12/21/23. Groin sites CDI. He reports feeling well. No further AF. He notes some episodes of skipped beats like PVCs at night that have been annoying. He is back to activity and feeling well, has not been exercising as much as he wants. He denies abdominal fullness/bloating or peripheral edema, shortness of breath, paroxysmal nocturnal dyspnea, orthopnea, lightheadedness, dizziness, pre-syncope, or syncope. Presenting 12 lead ECG shows NSR Vent Rate 73 bpm,  ms, QRS 84 ms, QTc 427 ms. Current cardiac medications include: Diltiazem and Eliquis.       I have reviewed and updated the patient's Past Medical History, Social History, Family History and Medication List.     Cardiographics (Personally Reviewed) :   10/22/21 Echo:   Interpretation Summary  Global and regional left ventricular function is normal with an EF of 60-65%.  Right ventricular function, chamber size, wall motion, and thickness are normal.  The inferior vena cava is normal.  No pericardial effusion is present.  There is no prior study for direct comparison.    12/20/23 CMR:    1. The left ventricle is normal in cavity size and wall thickness. There are no regional wall motion  abnormalities. The global systolic function is normal. The LVEF is 70%.     2. The right ventricle is normal in cavity size. The global systolic function is normal. The RVEF is 55%.      3. Both atria are normal in size.     4. There is no significant valvular disease.      5. There is no late gadolinium enhancement in the ventricles to indicate fibrosis or infiltrative disease.      6. There is no pericardial effusion.     7. There is no intracardiac thrombus.     MR Pulmonary veins     1. Four pulmonary veins are connected to the left atrium with no evidence of stenosis.      CONCLUSIONS:   Normal  biventricular size and function, LVEF 70% and RVEF 55%.  No intracardiac thrombus.   No evidence of myocardial fibrosis.   Four pulmonary veins seen connected to the left atrium.       Physical Examination   Wt 102.7 kg (226 lb 6.4 oz)   BMI 28.30 kg/m    Wt Readings from Last 3 Encounters:   12/21/23 100.1 kg (220 lb 10.9 oz)   11/15/23 102.5 kg (225 lb 14.4 oz)   07/05/23 101.1 kg (222 lb 14.4 oz)     General Appearance:   Alert, well-appearing and in no acute distress.   HEENT: Atraumatic, normocephalic. MMM.   Chest/Lungs:   Respirations unlabored.  Lungs are clear to auscultation.   Cardiovascular:   Regular rate and rhythm.  S1/S2. No murmur.    Abdomen:  Soft, nontender, nondistended.   Extremities: No cyanosis or clubbing. No edema.    Musculoskeletal: Moves all extremities.  Gait normal.   Skin: Warm, dry, intact.    Neurologic: Mood and affect are appropriate.  Alert and oriented to person, place, time, and situation.            Medications  Allergies   Current Outpatient Medications   Medication Sig Dispense Refill    apixaban ANTICOAGULANT (ELIQUIS) 5 MG tablet Take 1 tablet (5 mg) by mouth 2 times daily 180 tablet 0    apixaban ANTICOAGULANT (ELIQUIS) 5 MG tablet Take 1 tablet (5 mg) by mouth 2 times daily 60 tablet 0    diltiazem (CARDIZEM) 30 MG tablet Take 1 tablet (30 mg) by mouth 3 times daily as needed (with AF episode) 50 tablet 0    Misc Natural Products (RELAXMAX) POWD       No Known Allergies      Lab Results (Personally Reviewed)    Chemistry/lipid CBC Cardiac Enzymes/BNP/TSH/INR   Lab Results   Component Value Date    BUN 16.3 12/21/2023     12/21/2023    CO2 26 12/21/2023     Creatinine   Date Value Ref Range Status   12/21/2023 1.25 (H) 0.67 - 1.17 mg/dL Final   06/11/2021 1.18 0.66 - 1.25 mg/dL Final       Lab Results   Component Value Date    CHOL 172 06/11/2021    HDL 66 06/11/2021    LDL 97 06/11/2021      Lab Results   Component Value Date    WBC 5.2 12/21/2023    HGB 13.6  12/21/2023    HCT 43.1 12/21/2023    MCV 81 12/21/2023     12/21/2023    Lab Results   Component Value Date    TSH 1.53 11/15/2021    INR 1.02 10/21/2021        The patient states understanding and is agreeable with the plan.   BRENT Bella CNP  Electrophysiology Consult Service  Securely message with [employer]   Text page via [facility] Paging/Directory                     Optimal Vascular Metrics    Blood Pressure   BP < 140/90 Yes    On Aspirin  No: Contraindicated due to: not indicated    On Statin  No: Contraindicated due to: Not indicated    Tobacco use  No

## 2024-03-25 LAB
ATRIAL RATE - MUSE: 73 BPM
DIASTOLIC BLOOD PRESSURE - MUSE: NORMAL MMHG
INTERPRETATION ECG - MUSE: NORMAL
P AXIS - MUSE: 47 DEGREES
PR INTERVAL - MUSE: 166 MS
QRS DURATION - MUSE: 84 MS
QT - MUSE: 388 MS
QTC - MUSE: 427 MS
R AXIS - MUSE: -27 DEGREES
SYSTOLIC BLOOD PRESSURE - MUSE: NORMAL MMHG
T AXIS - MUSE: 32 DEGREES
VENTRICULAR RATE- MUSE: 73 BPM

## 2024-05-17 ENCOUNTER — OFFICE VISIT (OUTPATIENT)
Dept: FAMILY MEDICINE | Facility: CLINIC | Age: 49
End: 2024-05-17
Payer: COMMERCIAL

## 2024-05-17 VITALS
HEIGHT: 75 IN | DIASTOLIC BLOOD PRESSURE: 86 MMHG | TEMPERATURE: 97.1 F | WEIGHT: 223.9 LBS | SYSTOLIC BLOOD PRESSURE: 134 MMHG | HEART RATE: 61 BPM | RESPIRATION RATE: 12 BRPM | OXYGEN SATURATION: 99 % | BODY MASS INDEX: 27.84 KG/M2

## 2024-05-17 DIAGNOSIS — G47.59 OTHER PARASOMNIA: ICD-10-CM

## 2024-05-17 DIAGNOSIS — E04.9 GOITER: ICD-10-CM

## 2024-05-17 DIAGNOSIS — I48.0 PAF (PAROXYSMAL ATRIAL FIBRILLATION) (H): ICD-10-CM

## 2024-05-17 DIAGNOSIS — G47.61 PERIODIC LIMB MOVEMENT DISORDER: ICD-10-CM

## 2024-05-17 DIAGNOSIS — Z13.220 LIPID SCREENING: ICD-10-CM

## 2024-05-17 DIAGNOSIS — D64.9 NORMOCYTIC ANEMIA: ICD-10-CM

## 2024-05-17 DIAGNOSIS — N18.2 CKD (CHRONIC KIDNEY DISEASE) STAGE 2, GFR 60-89 ML/MIN: Primary | ICD-10-CM

## 2024-05-17 PROBLEM — I49.3 PREMATURE VENTRICULAR CONTRACTIONS: Status: ACTIVE | Noted: 2024-05-17

## 2024-05-17 LAB
ERYTHROCYTE [DISTWIDTH] IN BLOOD BY AUTOMATED COUNT: 14.1 % (ref 10–15)
HCT VFR BLD AUTO: 42.4 % (ref 40–53)
HGB BLD-MCNC: 13.2 G/DL (ref 13.3–17.7)
MCH RBC QN AUTO: 25.2 PG (ref 26.5–33)
MCHC RBC AUTO-ENTMCNC: 31.1 G/DL (ref 31.5–36.5)
MCV RBC AUTO: 81 FL (ref 78–100)
PLATELET # BLD AUTO: 200 10E3/UL (ref 150–450)
RBC # BLD AUTO: 5.24 10E6/UL (ref 4.4–5.9)
WBC # BLD AUTO: 4.2 10E3/UL (ref 4–11)

## 2024-05-17 PROCEDURE — 82728 ASSAY OF FERRITIN: CPT | Performed by: INTERNAL MEDICINE

## 2024-05-17 PROCEDURE — 84443 ASSAY THYROID STIM HORMONE: CPT | Performed by: INTERNAL MEDICINE

## 2024-05-17 PROCEDURE — 36415 COLL VENOUS BLD VENIPUNCTURE: CPT | Performed by: INTERNAL MEDICINE

## 2024-05-17 PROCEDURE — 80061 LIPID PANEL: CPT | Performed by: INTERNAL MEDICINE

## 2024-05-17 PROCEDURE — 85027 COMPLETE CBC AUTOMATED: CPT | Performed by: INTERNAL MEDICINE

## 2024-05-17 PROCEDURE — 83540 ASSAY OF IRON: CPT | Performed by: INTERNAL MEDICINE

## 2024-05-17 PROCEDURE — 80053 COMPREHEN METABOLIC PANEL: CPT | Performed by: INTERNAL MEDICINE

## 2024-05-17 PROCEDURE — 99215 OFFICE O/P EST HI 40 MIN: CPT | Performed by: INTERNAL MEDICINE

## 2024-05-17 PROCEDURE — 83550 IRON BINDING TEST: CPT | Performed by: INTERNAL MEDICINE

## 2024-05-17 ASSESSMENT — PAIN SCALES - GENERAL: PAINLEVEL: NO PAIN (0)

## 2024-05-17 NOTE — PROGRESS NOTES
"  Assessment & Plan     (G47.59) Other parasomnia  (G47.61) Periodic limb movement disorder  Comment: REM sleep without atonia present during sleep study 2023 also with mild MERA- AHI 5.2.  APAP recommended but not tolerated.  Plan: Comprehensive metabolic panel (BMP + Alb, Alk         Phos, ALT, AST, Total. Bili, TP), CBC with         platelets, Iron and iron binding capacity,         Ferritin  - Discussed synucleinopathies- for now, has fam history of parasomnias- will monitor for clinical signs but for now do not worry about it.  - CBTi recommended for insomnia  - He will let me know if feels like he needs something faster acting like trazodone  - See me again soon to continue discussion    (E04.9) Goiter  Comment: Enlarging over time- quite large in right lobe on exam today.  Repeat US marisol with history of MERA.  Plan: US Thyroid, TSH with free T4 reflex    (Z13.220) Lipid screening  Comment:   Plan: Lipid panel reflex to direct LDL Non-fasting    (I48.0) PAF (paroxysmal atrial fibrillation) (H)  Comment: s/p ablation, managed by Cardiology.        I spent a total of 52 minutes on the day of the visit.   Time spent by me doing chart review, history and exam, documentation and further activities per the note      BMI  Estimated body mass index is 27.99 kg/m  as calculated from the following:    Height as of this encounter: 1.905 m (6' 3\").    Weight as of this encounter: 101.6 kg (223 lb 14.4 oz).         Patient Instructions   Insomnia  CBT-I  - All the tools you need to tune up sleep - sleep diary, tools and exercises for quieting your mind, learn about sleep and how to improve it, set reminder messages with tips, motivation and alarms to change sleep habits.  SleepBot - tracks sleep and provides sleep hygiene advice    Stress Management  [id number] - Stress management tool. Includes breathing exercises.  Headspace - Guided 10 minute meditations with animated video about how to meditate (free for the first " 10 days)  Insight Timer - Guided meditations and customizable meditation timer with peaceful sounds  Meditation  - Detailed instruction in 9 forms of mindfulness, guided exercises, education about mindfulness  Stop, Breathe, Think - Develop mindfulness and compassion. Guides you to check in to the moment, mindful breathing, broaden your perspective, and strengthen your sense of calm.   Take a Break! - Two guided meditations, 7 minute break and 13 minute stress relieft  Virtual Hope Box - Includes coping, relaxation, distraction, and positive thinking tools.  Insight timer - different types of meditations, can sort by amount of time  Mindfulness  -  [facility]    Let's check iron, thyroid studies, and a thyroid ultrasound to evaluate goiter.    Subjective   Angie is a 49 year old, presenting for the following health issues:  Blood Draw (Thyroid checked ) and Sleep Problem (Not being able to sleep )      5/17/2024     7:56 AM   Additional Questions   Roomed by Jonna monroe     History of Present Illness       Hypothyroidism:     Since last visit, patient describes the following symptoms::  Fatigue    Reason for visit:  Yrouble falling asleep    He eats 2-3 servings of fruits and vegetables daily.He consumes 0 sweetened beverage(s) daily.He exercises with enough effort to increase his heart rate 10 to 19 minutes per day.  He exercises with enough effort to increase his heart rate 3 or less days per week.   He is taking medications regularly.     History of PAF, last Cards visit 3/22/24  PVI 12/21/23 and GIB s/p polypectomy  Stopped Eliquis post ablation, CHADSVASC=0  As needed diltiazem used  AF has been well controlled- none since December.  Had sleep study 3/6/23-   During sleep will kick- sometimes acting out his dreams.  Tried CPAP- gave it a good 3 months but it was so uncomfortable that it interfered with his sleep more than being helpful.  Signicant Periodic limb movements noted  "with REM without atonia present.  Is feeling sleep in the evening.     Tends to have a hard time sleeping.  Tends to have a hard time falling asleep.  Once asleep, usually stays asleep.  Last night, took 2.5 hours to fall asleep.  But, if has poor sleep for multiple days in a row- starts to get other neurologic type symptoms- feels like there's a weight in the brain- like a fogginess/cloudiness \"like a migraine without the pain.\"  When falling asleep, will having tingling in his body especially in his extremities.  If he's lying there a feels a palptitation then starts to spur anxiety, feeling like he's going to die or have more atrial fibrillation again.    Then when has gotten a few good night's sleep- symptoms felt better.    Feels like thyroid has grown since he last saw him.    Main goal today- get an idea of sleep deprivation and second to further evaluate thyroid.    Takes RelaxMax.   Amount Per Serving %Daily Value Magnesium (as di-magnesium malate)- 75 mg  75 mg 18% ferny-Inositol  2 g ** Taurine  500 mg ** ARLINE (gamma-aminobutyric acid)  100 mg ** L-TheanineS2  50 mg ** ** Daily Value not established.    This has helped reduce kicking at night- very rarely, happens less than once per month- has happened once in the last 12 months.  Melatonin gives him nightmares.          Objective    /86 (BP Location: Right arm, Patient Position: Sitting, Cuff Size: Adult Regular)   Pulse 61   Temp 97.1  F (36.2  C) (Temporal)   Resp 12   Ht 1.905 m (6' 3\")   Wt 101.6 kg (223 lb 14.4 oz)   SpO2 99%   BMI 27.99 kg/m    Body mass index is 27.99 kg/m .  Physical Exam   GENERAL: alert and no distress  NECK: large goiter present R>L  RESP: lungs clear to auscultation - no rales, rhonchi or wheezes  CV: regular rate and rhythm, normal S1 S2, no S3 or S4, no murmur, click or rub, no peripheral edema  MS: no gross musculoskeletal defects noted, no edema  PSYCH: mentation appears normal, affect normal/bright        "     Signed Electronically by: Amanda Alegre, DO

## 2024-05-17 NOTE — PATIENT INSTRUCTIONS
Insomnia  CBT-I  - All the tools you need to tune up sleep - sleep diary, tools and exercises for quieting your mind, learn about sleep and how to improve it, set reminder messages with tips, motivation and alarms to change sleep habits.  SleepBot - tracks sleep and provides sleep hygiene advice    Stress Management  [id number] - Stress management tool. Includes breathing exercises.  Headspace - Guided 10 minute meditations with animated video about how to meditate (free for the first 10 days)  Insight Timer - Guided meditations and customizable meditation timer with peaceful sounds  Meditation  - Detailed instruction in 9 forms of mindfulness, guided exercises, education about mindfulness  Stop, Breathe, Think - Develop mindfulness and compassion. Guides you to check in to the moment, mindful breathing, broaden your perspective, and strengthen your sense of calm.   Take a Break! - Two guided meditations, 7 minute break and 13 minute stress relieft  Virtual Hope Box - Includes coping, relaxation, distraction, and positive thinking tools.  Insight timer - different types of meditations, can sort by amount of time  Mindfulness  -  [facility]    Let's check iron, thyroid studies, and a thyroid ultrasound to evaluate goiter.

## 2024-05-18 LAB
ALBUMIN SERPL BCG-MCNC: 4.3 G/DL (ref 3.5–5.2)
ALP SERPL-CCNC: 63 U/L (ref 40–150)
ALT SERPL W P-5'-P-CCNC: 46 U/L (ref 0–70)
ANION GAP SERPL CALCULATED.3IONS-SCNC: 10 MMOL/L (ref 7–15)
AST SERPL W P-5'-P-CCNC: 104 U/L (ref 0–45)
BILIRUB SERPL-MCNC: 0.5 MG/DL
BUN SERPL-MCNC: 14.3 MG/DL (ref 6–20)
CALCIUM SERPL-MCNC: 9.6 MG/DL (ref 8.6–10)
CHLORIDE SERPL-SCNC: 105 MMOL/L (ref 98–107)
CHOLEST SERPL-MCNC: 164 MG/DL
CREAT SERPL-MCNC: 1.23 MG/DL (ref 0.67–1.17)
DEPRECATED HCO3 PLAS-SCNC: 26 MMOL/L (ref 22–29)
EGFRCR SERPLBLD CKD-EPI 2021: 72 ML/MIN/1.73M2
FASTING STATUS PATIENT QL REPORTED: YES
FASTING STATUS PATIENT QL REPORTED: YES
FERRITIN SERPL-MCNC: 159 NG/ML (ref 31–409)
GLUCOSE SERPL-MCNC: 87 MG/DL (ref 70–99)
HDLC SERPL-MCNC: 62 MG/DL
IRON BINDING CAPACITY (ROCHE): 196 UG/DL (ref 240–430)
IRON SATN MFR SERPL: 24 % (ref 15–46)
IRON SERPL-MCNC: 48 UG/DL (ref 61–157)
LDLC SERPL CALC-MCNC: 94 MG/DL
NONHDLC SERPL-MCNC: 102 MG/DL
POTASSIUM SERPL-SCNC: 4.2 MMOL/L (ref 3.4–5.3)
PROT SERPL-MCNC: 6.9 G/DL (ref 6.4–8.3)
SODIUM SERPL-SCNC: 141 MMOL/L (ref 135–145)
TRIGL SERPL-MCNC: 39 MG/DL
TSH SERPL DL<=0.005 MIU/L-ACNC: 1.82 UIU/ML (ref 0.3–4.2)

## 2024-05-23 ENCOUNTER — LAB (OUTPATIENT)
Dept: LAB | Facility: CLINIC | Age: 49
End: 2024-05-23
Payer: COMMERCIAL

## 2024-05-23 DIAGNOSIS — D64.9 NORMOCYTIC ANEMIA: ICD-10-CM

## 2024-05-23 DIAGNOSIS — N18.2 CKD (CHRONIC KIDNEY DISEASE) STAGE 2, GFR 60-89 ML/MIN: ICD-10-CM

## 2024-05-23 LAB
ALBUMIN UR-MCNC: NEGATIVE MG/DL
APPEARANCE UR: CLEAR
BASOPHILS # BLD AUTO: 0 10E3/UL (ref 0–0.2)
BASOPHILS NFR BLD AUTO: 1 %
BILIRUB UR QL STRIP: NEGATIVE
COLOR UR AUTO: YELLOW
EOSINOPHIL # BLD AUTO: 0.1 10E3/UL (ref 0–0.7)
EOSINOPHIL NFR BLD AUTO: 2 %
ERYTHROCYTE [DISTWIDTH] IN BLOOD BY AUTOMATED COUNT: 14 % (ref 10–15)
GLUCOSE UR STRIP-MCNC: NEGATIVE MG/DL
HCT VFR BLD AUTO: 46.6 % (ref 40–53)
HGB BLD-MCNC: 14.3 G/DL (ref 13.3–17.7)
HGB UR QL STRIP: NEGATIVE
IMM GRANULOCYTES # BLD: 0 10E3/UL
IMM GRANULOCYTES NFR BLD: 0 %
KETONES UR STRIP-MCNC: NEGATIVE MG/DL
LEUKOCYTE ESTERASE UR QL STRIP: NEGATIVE
LYMPHOCYTES # BLD AUTO: 1.4 10E3/UL (ref 0.8–5.3)
LYMPHOCYTES NFR BLD AUTO: 36 %
MCH RBC QN AUTO: 25.2 PG (ref 26.5–33)
MCHC RBC AUTO-ENTMCNC: 30.7 G/DL (ref 31.5–36.5)
MCV RBC AUTO: 82 FL (ref 78–100)
MONOCYTES # BLD AUTO: 0.5 10E3/UL (ref 0–1.3)
MONOCYTES NFR BLD AUTO: 12 %
NEUTROPHILS # BLD AUTO: 1.9 10E3/UL (ref 1.6–8.3)
NEUTROPHILS NFR BLD AUTO: 49 %
NITRATE UR QL: NEGATIVE
PH UR STRIP: 5.5 [PH] (ref 5–7)
PLATELET # BLD AUTO: 211 10E3/UL (ref 150–450)
RBC # BLD AUTO: 5.67 10E6/UL (ref 4.4–5.9)
RETICS # AUTO: 0.05 10E6/UL (ref 0.03–0.1)
RETICS/RBC NFR AUTO: 1 % (ref 0.5–2)
SP GR UR STRIP: 1.01 (ref 1–1.03)
UROBILINOGEN UR STRIP-ACNC: 0.2 E.U./DL
WBC # BLD AUTO: 3.8 10E3/UL (ref 4–11)

## 2024-05-23 PROCEDURE — 84165 PROTEIN E-PHORESIS SERUM: CPT | Performed by: PATHOLOGY

## 2024-05-23 PROCEDURE — 85060 BLOOD SMEAR INTERPRETATION: CPT | Performed by: PATHOLOGY

## 2024-05-23 PROCEDURE — 81003 URINALYSIS AUTO W/O SCOPE: CPT

## 2024-05-23 PROCEDURE — 36415 COLL VENOUS BLD VENIPUNCTURE: CPT

## 2024-05-23 PROCEDURE — 82784 ASSAY IGA/IGD/IGG/IGM EACH: CPT | Mod: 59

## 2024-05-23 PROCEDURE — 84155 ASSAY OF PROTEIN SERUM: CPT

## 2024-05-23 PROCEDURE — 82784 ASSAY IGA/IGD/IGG/IGM EACH: CPT

## 2024-05-23 PROCEDURE — 85045 AUTOMATED RETICULOCYTE COUNT: CPT

## 2024-05-23 PROCEDURE — 86334 IMMUNOFIX E-PHORESIS SERUM: CPT | Performed by: PATHOLOGY

## 2024-05-23 PROCEDURE — 84166 PROTEIN E-PHORESIS/URINE/CSF: CPT | Performed by: PATHOLOGY

## 2024-05-23 PROCEDURE — 85025 COMPLETE CBC W/AUTO DIFF WBC: CPT

## 2024-05-24 ENCOUNTER — ANCILLARY PROCEDURE (OUTPATIENT)
Dept: ULTRASOUND IMAGING | Facility: CLINIC | Age: 49
End: 2024-05-24
Attending: INTERNAL MEDICINE
Payer: COMMERCIAL

## 2024-05-24 DIAGNOSIS — E04.9 GOITER: ICD-10-CM

## 2024-05-24 LAB
ALBUMIN SERPL ELPH-MCNC: 4.7 G/DL (ref 3.7–5.1)
ALPHA1 GLOB SERPL ELPH-MCNC: 0.2 G/DL (ref 0.2–0.4)
ALPHA2 GLOB SERPL ELPH-MCNC: 0.4 G/DL (ref 0.5–0.9)
B-GLOBULIN SERPL ELPH-MCNC: 0.8 G/DL (ref 0.6–1)
GAMMA GLOB SERPL ELPH-MCNC: 1.1 G/DL (ref 0.7–1.6)
IGA SERPL-MCNC: 379 MG/DL (ref 84–499)
IGG SERPL-MCNC: 1211 MG/DL (ref 610–1616)
IGM SERPL-MCNC: 66 MG/DL (ref 35–242)
M PROTEIN SERPL ELPH-MCNC: 0 G/DL
PATH REPORT.COMMENTS IMP SPEC: ABNORMAL
PATH REPORT.COMMENTS IMP SPEC: NORMAL
PATH REPORT.COMMENTS IMP SPEC: NORMAL
PATH REPORT.FINAL DX SPEC: NORMAL
PATH REPORT.MICROSCOPIC SPEC OTHER STN: NORMAL
PATH REPORT.MICROSCOPIC SPEC OTHER STN: NORMAL
PATH REPORT.RELEVANT HX SPEC: NORMAL
PROT PATTERN SERPL ELPH-IMP: ABNORMAL
PROT PATTERN SERPL IFE-IMP: NORMAL
TOTAL PROTEIN SERUM FOR ELP: 7.3 G/DL (ref 6.4–8.3)

## 2024-05-24 PROCEDURE — 76536 US EXAM OF HEAD AND NECK: CPT | Mod: GC | Performed by: RADIOLOGY

## 2024-05-29 LAB
PATH REPORT.COMMENTS IMP SPEC: NORMAL
PROT PATTERN UR ELPH-IMP: NORMAL

## 2024-06-26 ENCOUNTER — OFFICE VISIT (OUTPATIENT)
Dept: FAMILY MEDICINE | Facility: CLINIC | Age: 49
End: 2024-06-26
Payer: COMMERCIAL

## 2024-06-26 VITALS
BODY MASS INDEX: 27.48 KG/M2 | WEIGHT: 221 LBS | SYSTOLIC BLOOD PRESSURE: 123 MMHG | OXYGEN SATURATION: 98 % | HEIGHT: 75 IN | HEART RATE: 64 BPM | DIASTOLIC BLOOD PRESSURE: 72 MMHG | TEMPERATURE: 97.2 F | RESPIRATION RATE: 15 BRPM

## 2024-06-26 DIAGNOSIS — G47.61 PERIODIC LIMB MOVEMENT DISORDER: ICD-10-CM

## 2024-06-26 DIAGNOSIS — D64.9 NORMOCYTIC ANEMIA: ICD-10-CM

## 2024-06-26 DIAGNOSIS — I48.0 PAF (PAROXYSMAL ATRIAL FIBRILLATION) (H): ICD-10-CM

## 2024-06-26 DIAGNOSIS — N18.2 CKD (CHRONIC KIDNEY DISEASE) STAGE 2, GFR 60-89 ML/MIN: Primary | ICD-10-CM

## 2024-06-26 LAB
ALBUMIN MFR UR ELPH: 7.3 MG/DL
ALBUMIN SERPL BCG-MCNC: 4.5 G/DL (ref 3.5–5.2)
ALP SERPL-CCNC: 68 U/L (ref 40–150)
ALT SERPL W P-5'-P-CCNC: 25 U/L (ref 0–70)
ANION GAP SERPL CALCULATED.3IONS-SCNC: 9 MMOL/L (ref 7–15)
AST SERPL W P-5'-P-CCNC: 29 U/L (ref 0–45)
BASOPHILS # BLD AUTO: 0 10E3/UL (ref 0–0.2)
BASOPHILS NFR BLD AUTO: 1 %
BILIRUB SERPL-MCNC: 0.5 MG/DL
BUN SERPL-MCNC: 15 MG/DL (ref 6–20)
CALCIUM SERPL-MCNC: 9.6 MG/DL (ref 8.6–10)
CHLORIDE SERPL-SCNC: 105 MMOL/L (ref 98–107)
CREAT SERPL-MCNC: 1.25 MG/DL (ref 0.67–1.17)
CREAT UR-MCNC: 60.8 MG/DL
DEPRECATED HCO3 PLAS-SCNC: 26 MMOL/L (ref 22–29)
EGFRCR SERPLBLD CKD-EPI 2021: 71 ML/MIN/1.73M2
EOSINOPHIL # BLD AUTO: 0.1 10E3/UL (ref 0–0.7)
EOSINOPHIL NFR BLD AUTO: 2 %
ERYTHROCYTE [DISTWIDTH] IN BLOOD BY AUTOMATED COUNT: 13.9 % (ref 10–15)
GLUCOSE SERPL-MCNC: 89 MG/DL (ref 70–99)
HCT VFR BLD AUTO: 44.3 % (ref 40–53)
HGB BLD-MCNC: 13.9 G/DL (ref 13.3–17.7)
IMM GRANULOCYTES # BLD: 0 10E3/UL
IMM GRANULOCYTES NFR BLD: 0 %
LYMPHOCYTES # BLD AUTO: 1.4 10E3/UL (ref 0.8–5.3)
LYMPHOCYTES NFR BLD AUTO: 36 %
MCH RBC QN AUTO: 25.4 PG (ref 26.5–33)
MCHC RBC AUTO-ENTMCNC: 31.4 G/DL (ref 31.5–36.5)
MCV RBC AUTO: 81 FL (ref 78–100)
MONOCYTES # BLD AUTO: 0.4 10E3/UL (ref 0–1.3)
MONOCYTES NFR BLD AUTO: 11 %
NEUTROPHILS # BLD AUTO: 1.9 10E3/UL (ref 1.6–8.3)
NEUTROPHILS NFR BLD AUTO: 50 %
PLATELET # BLD AUTO: 210 10E3/UL (ref 150–450)
POTASSIUM SERPL-SCNC: 4.3 MMOL/L (ref 3.4–5.3)
PROT SERPL-MCNC: 7.2 G/DL (ref 6.4–8.3)
PROT/CREAT 24H UR: 0.12 MG/MG CR (ref 0–0.2)
RBC # BLD AUTO: 5.48 10E6/UL (ref 4.4–5.9)
SODIUM SERPL-SCNC: 140 MMOL/L (ref 135–145)
WBC # BLD AUTO: 3.8 10E3/UL (ref 4–11)

## 2024-06-26 PROCEDURE — 80053 COMPREHEN METABOLIC PANEL: CPT | Performed by: INTERNAL MEDICINE

## 2024-06-26 PROCEDURE — 36415 COLL VENOUS BLD VENIPUNCTURE: CPT | Performed by: INTERNAL MEDICINE

## 2024-06-26 PROCEDURE — 85025 COMPLETE CBC W/AUTO DIFF WBC: CPT | Performed by: INTERNAL MEDICINE

## 2024-06-26 PROCEDURE — 84156 ASSAY OF PROTEIN URINE: CPT | Performed by: INTERNAL MEDICINE

## 2024-06-26 PROCEDURE — 86038 ANTINUCLEAR ANTIBODIES: CPT | Performed by: INTERNAL MEDICINE

## 2024-06-26 PROCEDURE — 99215 OFFICE O/P EST HI 40 MIN: CPT | Performed by: INTERNAL MEDICINE

## 2024-06-26 NOTE — PROGRESS NOTES
"  Assessment & Plan     (N18.2) CKD (chronic kidney disease) stage 2, GFR 60-89 ml/min  (primary encounter diagnosis)  Comment: Longstanding mildly elevated creatinine.  Normocytic anemia also noted on labs previously- UPEP and SPEP were normal.  UA was bland.  If creatinine remains elevated on check today, then will recommend renal ultrasound and Nephrology referral, largely because he is young and although my concern for progression is low, I'm not sure why his creatinine would be mildly elevated.  Plan: Anti Nuclear Anita IgG by IFA with Reflex,         Protein  random urine, Comprehensive metabolic         panel (BMP + Alb, Alk Phos, ALT, AST, Total.         Bili, TP), CBC with platelets and differential    (D64.9) Normocytic anemia  Comment: Noted- transient, now resolved- but does not appear to be iron deficiency based on labs. Does have very slightly low WBC on labs, too- so will also check ALEX marisol with elevated creatinine.  Plan: Anti Nuclear Anita IgG by IFA with Reflex,         Protein  random urine, Comprehensive metabolic         panel (BMP + Alb, Alk Phos, ALT, AST, Total.         Bili, TP), CBC with platelets and differential    (I48.0) PAF (paroxysmal atrial fibrillation) (H)  Comment: Noted, managed by cardiology- CHADS-VASC 0.  Treated with pill in the pocket.    (G47.61) Periodic limb movement disorder  Comment: Has had REM sleep without atonia on previous sleep study- acts out dreams rarely.  No signs of synucleinopathy -continue to monitor on exam and symptoms.          I spent a total of 43 minutes on the day of the visit.   Time spent by me doing chart review, history and exam, documentation and further activities per the note    BMI  Estimated body mass index is 27.62 kg/m  as calculated from the following:    Height as of this encounter: 1.905 m (6' 3\").    Weight as of this encounter: 100.2 kg (221 lb).         There are no Patient Instructions on file for this visit.    Subjective   Angie is a " "49 year old, presenting for the following health issues:  Follow Up (Follow Up on Imaging and Labs )        6/26/2024    10:53 AM   Additional Questions   Roomed by Riana Hdez     History of Present Illness       Reason for visit:  Lab follow up    He eats 0-1 servings of fruits and vegetables daily.He consumes 0 sweetened beverage(s) daily.He exercises with enough effort to increase his heart rate 10 to 19 minutes per day.  He exercises with enough effort to increase his heart rate 4 days per week.   He is taking medications regularly.     I last saw on 5/17/24.    At that time, lightheadedness had already started to improve.  But then, this last Monday, 6/24/24, he did have it a little bit.  Now wondering if could have some gluten intolerance.  On Sunday, had baguette, large almond croissant, buns.  Mom avoids gluten because she develops symptoms.  He is planning to avoid entirely.    Kidneys- work up thus far negative.    Sleeping has been really good lately.  Within a week of our last visit did have kicking at night- but none since.  Has been able to fall asleep fairly quickly.  And sleeping throughout the night.  Yesterday morning did feel drowsier- almost feels heavier on left forehead.    Plays [employer] and [employer] [place] times games each night- havin ga pretty good run.          Objective    /72 (BP Location: Right arm, Patient Position: Sitting, Cuff Size: Adult Large)   Pulse 64   Temp 97.2  F (36.2  C) (Temporal)   Resp 15   Ht 1.905 m (6' 3\")   Wt 100.2 kg (221 lb)   SpO2 98%   BMI 27.62 kg/m    Body mass index is 27.62 kg/m .  Physical Exam   GENERAL: alert and no distress  EYES: Eyes grossly normal to inspection, PERRL and conjunctivae and sclerae normal  ABDOMEN: soft, nontender, no hepatosplenomegaly, no masses and bowel sounds normal  MS: no gross musculoskeletal defects noted, no edema  NEURO: Normal strength and tone, mentation intact and speech normal, normal rapid alternating " movements, no cogwheel rigidity, negative Romberg's.  Normal gait.  Normal EOMI.            Signed Electronically by: Amanda Alegre DO

## 2024-06-27 LAB — ANA SER QL IF: NEGATIVE

## 2024-08-20 ENCOUNTER — TELEPHONE (OUTPATIENT)
Dept: CARDIOLOGY | Facility: CLINIC | Age: 49
End: 2024-08-20
Payer: COMMERCIAL

## 2024-10-01 ENCOUNTER — OFFICE VISIT (OUTPATIENT)
Dept: FAMILY MEDICINE | Facility: CLINIC | Age: 49
End: 2024-10-01
Payer: COMMERCIAL

## 2024-10-01 VITALS
WEIGHT: 216 LBS | RESPIRATION RATE: 16 BRPM | OXYGEN SATURATION: 99 % | TEMPERATURE: 97.9 F | HEART RATE: 69 BPM | BODY MASS INDEX: 27.72 KG/M2 | DIASTOLIC BLOOD PRESSURE: 85 MMHG | HEIGHT: 74 IN | SYSTOLIC BLOOD PRESSURE: 124 MMHG

## 2024-10-01 DIAGNOSIS — Z00.00 ROUTINE GENERAL MEDICAL EXAMINATION AT A HEALTH CARE FACILITY: ICD-10-CM

## 2024-10-01 DIAGNOSIS — E04.9 GOITER: ICD-10-CM

## 2024-10-01 DIAGNOSIS — Z11.59 NEED FOR HEPATITIS C SCREENING TEST: ICD-10-CM

## 2024-10-01 DIAGNOSIS — R79.89 ELEVATED SERUM CREATININE: Primary | ICD-10-CM

## 2024-10-01 DIAGNOSIS — N18.2 CKD (CHRONIC KIDNEY DISEASE) STAGE 2, GFR 60-89 ML/MIN: ICD-10-CM

## 2024-10-01 LAB
EST. AVERAGE GLUCOSE BLD GHB EST-MCNC: 103 MG/DL
HBA1C MFR BLD: 5.2 % (ref 0–5.6)

## 2024-10-01 PROCEDURE — 86706 HEP B SURFACE ANTIBODY: CPT | Performed by: INTERNAL MEDICINE

## 2024-10-01 PROCEDURE — 99207 E-CONSULT TO NEPHROLOGY (ADULT OUTPT PROVIDER TO SPECIALIST WRITTEN QUESTION & RESPONSE): CPT | Performed by: INTERNAL MEDICINE

## 2024-10-01 PROCEDURE — 82570 ASSAY OF URINE CREATININE: CPT | Performed by: INTERNAL MEDICINE

## 2024-10-01 PROCEDURE — 99396 PREV VISIT EST AGE 40-64: CPT | Performed by: INTERNAL MEDICINE

## 2024-10-01 PROCEDURE — 83036 HEMOGLOBIN GLYCOSYLATED A1C: CPT | Performed by: INTERNAL MEDICINE

## 2024-10-01 PROCEDURE — 82043 UR ALBUMIN QUANTITATIVE: CPT | Performed by: INTERNAL MEDICINE

## 2024-10-01 PROCEDURE — 99214 OFFICE O/P EST MOD 30 MIN: CPT | Mod: 25 | Performed by: INTERNAL MEDICINE

## 2024-10-01 PROCEDURE — 36415 COLL VENOUS BLD VENIPUNCTURE: CPT | Performed by: INTERNAL MEDICINE

## 2024-10-01 PROCEDURE — 80053 COMPREHEN METABOLIC PANEL: CPT | Performed by: INTERNAL MEDICINE

## 2024-10-01 PROCEDURE — 86803 HEPATITIS C AB TEST: CPT | Performed by: INTERNAL MEDICINE

## 2024-10-01 PROCEDURE — 80061 LIPID PANEL: CPT | Performed by: INTERNAL MEDICINE

## 2024-10-01 PROCEDURE — 82610 CYSTATIN C: CPT | Performed by: INTERNAL MEDICINE

## 2024-10-01 SDOH — HEALTH STABILITY: PHYSICAL HEALTH: ON AVERAGE, HOW MANY DAYS PER WEEK DO YOU ENGAGE IN MODERATE TO STRENUOUS EXERCISE (LIKE A BRISK WALK)?: 4 DAYS

## 2024-10-01 ASSESSMENT — SOCIAL DETERMINANTS OF HEALTH (SDOH): HOW OFTEN DO YOU GET TOGETHER WITH FRIENDS OR RELATIVES?: ONCE A WEEK

## 2024-10-01 ASSESSMENT — PAIN SCALES - GENERAL: PAINLEVEL: NO PAIN (0)

## 2024-10-01 NOTE — PATIENT INSTRUCTIONS
Check with your mom if you got the pneumonia vaccine when you were a kid      Patient Education   Preventive Care Advice   This is general advice given by our system to help you stay healthy. However, your care team may have specific advice just for you. Please talk to your care team about your preventive care needs.  Nutrition  Eat 5 or more servings of fruits and vegetables each day.  Try wheat bread, brown rice and whole grain pasta (instead of white bread, rice, and pasta).  Get enough calcium and vitamin D. Check the label on foods and aim for 100% of the RDA (recommended daily allowance).  Lifestyle  Exercise at least 150 minutes each week  (30 minutes a day, 5 days a week).  Do muscle strengthening activities 2 days a week. These help control your weight and prevent disease.  No smoking.  Wear sunscreen to prevent skin cancer.  Have a dental exam and cleaning every 6 months.  Yearly exams  See your health care team every year to talk about:  Any changes in your health.  Any medicines your care team has prescribed.  Preventive care, family planning, and ways to prevent chronic diseases.  Shots (vaccines)   HPV shots (up to age 26), if you've never had them before.  Hepatitis B shots (up to age 59), if you've never had them before.  COVID-19 shot: Get this shot when it's due.  Flu shot: Get a flu shot every year.  Tetanus shot: Get a tetanus shot every 10 years.  Pneumococcal, hepatitis A, and RSV shots: Ask your care team if you need these based on your risk.  Shingles shot (for age 50 and up)  General health tests  Diabetes screening:  Starting at age 35, Get screened for diabetes at least every 3 years.  If you are younger than age 35, ask your care team if you should be screened for diabetes.  Cholesterol test: At age 39, start having a cholesterol test every 5 years, or more often if advised.  Bone density scan (DEXA): At age 50, ask your care team if you should have this scan for osteoporosis (brittle  bones).  Hepatitis C: Get tested at least once in your life.  STIs (sexually transmitted infections)  Before age 24: Ask your care team if you should be screened for STIs.  After age 24: Get screened for STIs if you're at risk. You are at risk for STIs (including HIV) if:  You are sexually active with more than one person.  You don't use condoms every time.  You or a partner was diagnosed with a sexually transmitted infection.  If you are at risk for HIV, ask about PrEP medicine to prevent HIV.  Get tested for HIV at least once in your life, whether you are at risk for HIV or not.  Cancer screening tests  Cervical cancer screening: If you have a cervix, begin getting regular cervical cancer screening tests starting at age 21.  Breast cancer scan (mammogram): If you've ever had breasts, begin having regular mammograms starting at age 40. This is a scan to check for breast cancer.  Colon cancer screening: It is important to start screening for colon cancer at age 45.  Have a colonoscopy test every 10 years (or more often if you're at risk) Or, ask your provider about stool tests like a FIT test every year or Cologuard test every 3 years.  To learn more about your testing options, visit:   .  For help making a decision, visit:   https://bit.ly/tm25485.  Prostate cancer screening test: If you have a prostate, ask your care team if a prostate cancer screening test (PSA) at age 55 is right for you.  Lung cancer screening: If you are a current or former smoker ages 50 to 80, ask your care team if ongoing lung cancer screenings are right for you.  For informational purposes only. Not to replace the advice of your health care provider. Copyright   2023 [facility]. All rights reserved. Clinically reviewed by the [facility]. [employer] [id number] - REV 01/24.  Learning About Stress  What is stress?     Stress is your body's response to a hard situation. Your body can have a physical,  emotional, or mental response. Stress is a fact of life for most people, and it affects everyone differently. What causes stress for you may not be stressful for someone else.  A lot of things can cause stress. You may feel stress when you go on a job interview, take a test, or run a race. This kind of short-term stress is normal and even useful. It can help you if you need to work hard or react quickly. For example, stress can help you finish an important job on time.  Long-term stress is caused by ongoing stressful situations or events. Examples of long-term stress include long-term health problems, ongoing problems at work, or conflicts in your family. Long-term stress can harm your health.  How does stress affect your health?  When you are stressed, your body responds as though you are in danger. It makes hormones that speed up your heart, make you breathe faster, and give you a burst of energy. This is called the fight-or-flight stress response. If the stress is over quickly, your body goes back to normal and no harm is done.  But if stress happens too often or lasts too long, it can have bad effects. Long-term stress can make you more likely to get sick, and it can make symptoms of some diseases worse. If you tense up when you are stressed, you may develop neck, shoulder, or low back pain. Stress is linked to high blood pressure and heart disease.  Stress also harms your emotional health. It can make you vasquez, tense, or depressed. Your relationships may suffer, and you may not do well at work or school.  What can you do to manage stress?  You can try these things to help manage stress:   Do something active. Exercise or activity can help reduce stress. Walking is a great way to get started. Even everyday activities such as housecleaning or yard work can help.  Try yoga or susan chi. These techniques combine exercise and meditation. You may need some training at first to learn them.  Do something you enjoy. For  "example, listen to music or go to a movie. Practice your hobby or do volunteer work.  Meditate. This can help you relax, because you are not worrying about what happened before or what may happen in the future.  Do guided imagery. Imagine yourself in any setting that helps you feel calm. You can use online videos, books, or a teacher to guide you.  Do breathing exercises. For example:  From a standing position, bend forward from the waist with your knees slightly bent. Let your arms dangle close to the floor.  Breathe in slowly and deeply as you return to a standing position. Roll up slowly and lift your head last.  Hold your breath for just a few seconds in the standing position.  Breathe out slowly and bend forward from the waist.  Let your feelings out. Talk, laugh, cry, and express anger when you need to. Talking with supportive friends or family, a counselor, or a rashid leader about your feelings is a healthy way to relieve stress. Avoid discussing your feelings with people who make you feel worse.  Write. It may help to write about things that are bothering you. This helps you find out how much stress you feel and what is causing it. When you know this, you can find better ways to cope.  What can you do to prevent stress?  You might try some of these things to help prevent stress:  Manage your time. This helps you find time to do the things you want and need to do.  Get enough sleep. Your body recovers from the stresses of the day while you are sleeping.  Get support. Your family, friends, and community can make a difference in how you experience stress.  Limit your news feed. Avoid or limit time on social media or news that may make you feel stressed.  Do something active. Exercise or activity can help reduce stress. Walking is a great way to get started.  Where can you learn more?  Go to https://www.healthwise.net/patiented  Enter N032 in the search box to learn more about \"Learning About Stress.\"  Current " as of: October 24, 2023               Content Version: 14.0    8938-2736 [employer].   Care instructions adapted under license by your healthcare professional. If you have questions about a medical condition or this instruction, always ask your healthcare professional. [employer] disclaims any warranty or liability for your use of this information.

## 2024-10-01 NOTE — PROGRESS NOTES
"Preventive Care Visit  [facility]  Amanda Alegre DO, Internal Medicine  Oct 1, 2024      Assessment & Plan     (Z00.00) Routine general medical examination at a health care facility  Comment:   Plan: Lipid panel reflex to direct LDL Non-fasting,         Hemoglobin A1c, Comprehensive metabolic panel         (BMP + Alb, Alk Phos, ALT, AST, Total. Bili,         TP), Cystatin C with GFR, Hepatitis B Surface         Antibody, US Renal Complete Non-Vascular,         Hepatitis C Screen Reflex to HCV RNA Quant and         Genotype  Colon: 11/23/20- repeat in 5 years (adenomatous polyps)  Immunizations: Defer influenza, COVID, PCV; check HBV serology  Labs: Lipids, Diabetes screen   Discussed healthy lifestyle and aging recommendations including regular exercise, adequate and regular sleep, 5+ fruits and veggies daily.      (N18.2) CKD (chronic kidney disease) stage 2, GFR 60-89 ml/min  Comment: Check cystatin c and kidney US- if elevated, plan to e-consult Nephrology.  Plan: Comprehensive metabolic panel (BMP + Alb, Alk         Phos, ALT, AST, Total. Bili, TP), Cystatin C         with GFR, Albumin Random Urine Quantitative         with Creat Ratio, US Renal Complete         Non-Vascular    (Z11.59) Need for hepatitis C screening test  Comment: indeterminate result previously.  Recheck today.  Plan: Hepatitis C Screen Reflex to HCV RNA Quant and         Genotype    (E04.9) Goiter  Comment: Thyroid US 2024- stable, no bx indicated.       Patient has been advised of split billing requirements and indicates understanding: Yes        BMI  Estimated body mass index is 27.43 kg/m  as calculated from the following:    Height as of this encounter: 1.89 m (6' 2.41\").    Weight as of this encounter: 98 kg (216 lb).       Counseling  Appropriate preventive services were addressed with this patient via screening, questionnaire, or discussion as appropriate for fall prevention, nutrition, physical " activity, Tobacco-use cessation, social engagement, weight loss and cognition.  Checklist reviewing preventive services available has been given to the patient.  Reviewed patient's diet, addressing concerns and/or questions.       Patient Instructions   Check with your mom if you got the pneumonia vaccine when you were a kid      Patient Education  Preventive Care Advice   This is general advice given by our system to help you stay healthy. However, your care team may have specific advice just for you. Please talk to your care team about your preventive care needs.  Nutrition  Eat 5 or more servings of fruits and vegetables each day.  Try wheat bread, brown rice and whole grain pasta (instead of white bread, rice, and pasta).  Get enough calcium and vitamin D. Check the label on foods and aim for 100% of the RDA (recommended daily allowance).  Lifestyle  Exercise at least 150 minutes each week  (30 minutes a day, 5 days a week).  Do muscle strengthening activities 2 days a week. These help control your weight and prevent disease.  No smoking.  Wear sunscreen to prevent skin cancer.  Have a dental exam and cleaning every 6 months.  Yearly exams  See your health care team every year to talk about:  Any changes in your health.  Any medicines your care team has prescribed.  Preventive care, family planning, and ways to prevent chronic diseases.  Shots (vaccines)   HPV shots (up to age 26), if you've never had them before.  Hepatitis B shots (up to age 59), if you've never had them before.  COVID-19 shot: Get this shot when it's due.  Flu shot: Get a flu shot every year.  Tetanus shot: Get a tetanus shot every 10 years.  Pneumococcal, hepatitis A, and RSV shots: Ask your care team if you need these based on your risk.  Shingles shot (for age 50 and up)  General health tests  Diabetes screening:  Starting at age 35, Get screened for diabetes at least every 3 years.  If you are younger than age 35, ask your care team if  you should be screened for diabetes.  Cholesterol test: At age 39, start having a cholesterol test every 5 years, or more often if advised.  Bone density scan (DEXA): At age 50, ask your care team if you should have this scan for osteoporosis (brittle bones).  Hepatitis C: Get tested at least once in your life.  STIs (sexually transmitted infections)  Before age 24: Ask your care team if you should be screened for STIs.  After age 24: Get screened for STIs if you're at risk. You are at risk for STIs (including HIV) if:  You are sexually active with more than one person.  You don't use condoms every time.  You or a partner was diagnosed with a sexually transmitted infection.  If you are at risk for HIV, ask about PrEP medicine to prevent HIV.  Get tested for HIV at least once in your life, whether you are at risk for HIV or not.  Cancer screening tests  Cervical cancer screening: If you have a cervix, begin getting regular cervical cancer screening tests starting at age 21.  Breast cancer scan (mammogram): If you've ever had breasts, begin having regular mammograms starting at age 40. This is a scan to check for breast cancer.  Colon cancer screening: It is important to start screening for colon cancer at age 45.  Have a colonoscopy test every 10 years (or more often if you're at risk) Or, ask your provider about stool tests like a FIT test every year or Cologuard test every 3 years.  To learn more about your testing options, visit:   .  For help making a decision, visit:   https://bit.ly/wk01453.  Prostate cancer screening test: If you have a prostate, ask your care team if a prostate cancer screening test (PSA) at age 55 is right for you.  Lung cancer screening: If you are a current or former smoker ages 50 to 80, ask your care team if ongoing lung cancer screenings are right for you.  For informational purposes only. Not to replace the advice of your health care provider. Copyright   2023 [employer]  All rights reserved. Clinically reviewed by the [facility]. [employer] [id number] - REV 01/24.  Learning About Stress  What is stress?     Stress is your body's response to a hard situation. Your body can have a physical, emotional, or mental response. Stress is a fact of life for most people, and it affects everyone differently. What causes stress for you may not be stressful for someone else.  A lot of things can cause stress. You may feel stress when you go on a job interview, take a test, or run a race. This kind of short-term stress is normal and even useful. It can help you if you need to work hard or react quickly. For example, stress can help you finish an important job on time.  Long-term stress is caused by ongoing stressful situations or events. Examples of long-term stress include long-term health problems, ongoing problems at work, or conflicts in your family. Long-term stress can harm your health.  How does stress affect your health?  When you are stressed, your body responds as though you are in danger. It makes hormones that speed up your heart, make you breathe faster, and give you a burst of energy. This is called the fight-or-flight stress response. If the stress is over quickly, your body goes back to normal and no harm is done.  But if stress happens too often or lasts too long, it can have bad effects. Long-term stress can make you more likely to get sick, and it can make symptoms of some diseases worse. If you tense up when you are stressed, you may develop neck, shoulder, or low back pain. Stress is linked to high blood pressure and heart disease.  Stress also harms your emotional health. It can make you vasquez, tense, or depressed. Your relationships may suffer, and you may not do well at work or school.  What can you do to manage stress?  You can try these things to help manage stress:   Do something active. Exercise or activity can help reduce stress. Walking is a  great way to get started. Even everyday activities such as housecleaning or yard work can help.  Try yoga or susan chi. These techniques combine exercise and meditation. You may need some training at first to learn them.  Do something you enjoy. For example, listen to music or go to a movie. Practice your hobby or do volunteer work.  Meditate. This can help you relax, because you are not worrying about what happened before or what may happen in the future.  Do guided imagery. Imagine yourself in any setting that helps you feel calm. You can use online videos, books, or a teacher to guide you.  Do breathing exercises. For example:  From a standing position, bend forward from the waist with your knees slightly bent. Let your arms dangle close to the floor.  Breathe in slowly and deeply as you return to a standing position. Roll up slowly and lift your head last.  Hold your breath for just a few seconds in the standing position.  Breathe out slowly and bend forward from the waist.  Let your feelings out. Talk, laugh, cry, and express anger when you need to. Talking with supportive friends or family, a counselor, or a rashid leader about your feelings is a healthy way to relieve stress. Avoid discussing your feelings with people who make you feel worse.  Write. It may help to write about things that are bothering you. This helps you find out how much stress you feel and what is causing it. When you know this, you can find better ways to cope.  What can you do to prevent stress?  You might try some of these things to help prevent stress:  Manage your time. This helps you find time to do the things you want and need to do.  Get enough sleep. Your body recovers from the stresses of the day while you are sleeping.  Get support. Your family, friends, and community can make a difference in how you experience stress.  Limit your news feed. Avoid or limit time on social media or news that may make you feel stressed.  Do something  "active. Exercise or activity can help reduce stress. Walking is a great way to get started.  Where can you learn more?  Go to https://www.Xand.net/patiented  Enter N032 in the search box to learn more about \"Learning About Stress.\"  Current as of: October 24, 2023               Content Version: 14.0    7718-2611 [employer]   Care instructions adapted under license by your healthcare professional. If you have questions about a medical condition or this instruction, always ask your healthcare professional. [employer] disclaims any warranty or liability for your use of this information.             Rich Adams is a 49 year old, presenting for the following:  Physical (Physical )        10/1/2024     2:11 PM   Additional Questions   Roomed by Riana Hdez        Health Care Directive  Patient does not have a Health Care Directive or Living Will: Discussed advance care planning with patient; however, patient declined at this time.    HPI    Hasn't needed to take diltiazem since ablation.  Has noticed a little more activity/fluttering.  Has happened a little bit more- feels more like a skipped bit.  No palpitations.  Has Cardiology follow up in January.    Stopped the RelaxMax powder- no change in sleep (better or worse).  Has had some acting out dreams- once every 3-4 months.          10/1/2024   General Health   How would you rate your overall physical health? Good   Feel stress (tense, anxious, or unable to sleep) Rather much      (!) STRESS CONCERN      10/1/2024   Nutrition   Three or more servings of calcium each day? Yes   Diet: Regular (no restrictions)   How many servings of fruit and vegetables per day? (!) 2-3   How many sweetened beverages each day? 0-1            10/1/2024   Exercise   Days per week of moderate/strenous exercise 4 days            10/1/2024   Social Factors   Frequency of gathering with friends or relatives Once a week   Worry food won't last until get " money to buy more No   Food not last or not have enough money for food? No   Do you have housing? (Housing is defined as stable permanent housing and does not include staying ouside in a car, in a tent, in an abandoned building, in an overnight shelter, or couch-surfing.) Yes   Are you worried about losing your housing? No   Lack of transportation? No   Unable to get utilities (heat,electricity)? No            10/1/2024   Dental   Dentist two times every year? Yes            10/1/2024   TB Screening   Were you born outside of the [place]? Yes              Today's PHQ-2 Score:       3/22/2024     1:44 PM   PHQ-2 ( 1999 [employer])   Q1: Little interest or pleasure in doing things 0   Q2: Feeling down, depressed or hopeless 0   PHQ-2 Score 0         10/1/2024   Substance Use   Alcohol more than 3/day or more than 7/wk No   Do you use any other substances recreationally? No        Social History     Tobacco Use    Smoking status: Never     Passive exposure: Past    Smokeless tobacco: Never   Vaping Use    Vaping status: Never Used   Substance Use Topics    Alcohol use: Yes     Comment: occ. whiskey 2 nights per week    Drug use: No           10/1/2024   STI Screening   New sexual partner(s) since last STI/HIV test? No      ASCVD Risk   The 10-year ASCVD risk score (Esau PARADA, et al., 2019) is: 1.6%    Values used to calculate the score:      Age: 49 years      Sex: Male      Is Non- : No      Diabetic: No      Tobacco smoker: No      Systolic Blood Pressure: 124 mmHg      Is BP treated: No      HDL Cholesterol: 71 mg/dL      Total Cholesterol: 171 mg/dL       Reviewed and updated as needed this visit by Provider     Meds   Med Hx  Surg Hx  Fam Hx            History reviewed. No pertinent past medical history.  Past Surgical History:   Procedure Laterality Date    ANESTHESIA CARDIOVERSION N/A 09/08/2023    Procedure: Anesthesia cardioversion@0900;  Surgeon: GENERIC ANESTHESIA PROVIDER;   "Location: [facility] OR    EP ABLATION FOCAL AFIB N/A 12/21/2023    Procedure: EP Ablation Focal AFIB;  Surgeon: Amanuel Chavarria MD;  Location: [facility]ARD[facility] CATH LAB    SOFT TISSUE SURGERY  2024    lipoma removal back    VASECTOMY  01/01/2004     Lab work is in process         Objective    Exam  /85 (BP Location: Right arm, Patient Position: Sitting, Cuff Size: Adult Large)   Pulse 69   Temp 97.9  F (36.6  C) (Temporal)   Resp 16   Ht 1.89 m (6' 2.41\")   Wt 98 kg (216 lb)   SpO2 99%   BMI 27.43 kg/m     Estimated body mass index is 27.43 kg/m  as calculated from the following:    Height as of this encounter: 1.89 m (6' 2.41\").    Weight as of this encounter: 98 kg (216 lb).    Physical Exam  GENERAL: alert and no distress  EYES: Eyes grossly normal to inspection, PERRL and conjunctivae and sclerae normal  HENT: ear canals and TM's normal, nose and mouth without ulcers or lesions  NECK: no adenopathy, no asymmetry, masses, or scars  RESP: lungs clear to auscultation - no rales, rhonchi or wheezes  CV: regular rate and rhythm, normal S1 S2, no S3 or S4, no murmur, click or rub, no peripheral edema  ABDOMEN: soft, nontender, no hepatosplenomegaly, no masses and bowel sounds normal  MS: no gross musculoskeletal defects noted, no edema  SKIN: no suspicious lesions or rashes  NEURO: Normal strength and tone, mentation intact and speech normal  PSYCH: mentation appears normal, affect normal/bright        Signed Electronically by: Amanda Alegre,     "

## 2024-10-02 LAB
ALBUMIN SERPL BCG-MCNC: 4.5 G/DL (ref 3.5–5.2)
ALP SERPL-CCNC: 65 U/L (ref 40–150)
ALT SERPL W P-5'-P-CCNC: 17 U/L (ref 0–70)
ANION GAP SERPL CALCULATED.3IONS-SCNC: 10 MMOL/L (ref 7–15)
AST SERPL W P-5'-P-CCNC: 22 U/L (ref 0–45)
BILIRUB SERPL-MCNC: 0.4 MG/DL
BUN SERPL-MCNC: 20.4 MG/DL (ref 6–20)
CALCIUM SERPL-MCNC: 9.8 MG/DL (ref 8.8–10.4)
CHLORIDE SERPL-SCNC: 104 MMOL/L (ref 98–107)
CHOLEST SERPL-MCNC: 171 MG/DL
CREAT SERPL-MCNC: 1.48 MG/DL (ref 0.67–1.17)
CREAT UR-MCNC: 312 MG/DL
CYSTATIN C (ROCHE): 0.7 MG/L (ref 0.6–1)
EGFRCR SERPLBLD CKD-EPI 2021: 58 ML/MIN/1.73M2
FASTING STATUS PATIENT QL REPORTED: NO
FASTING STATUS PATIENT QL REPORTED: NO
GFR/BSA.PRED SERPLBLD CYS-BASED-ARV: >90 ML/MIN/1.73M2
GLUCOSE SERPL-MCNC: 66 MG/DL (ref 70–99)
HBV SURFACE AB SERPL IA-ACNC: <3.5 M[IU]/ML
HBV SURFACE AB SERPL IA-ACNC: NONREACTIVE M[IU]/ML
HCO3 SERPL-SCNC: 25 MMOL/L (ref 22–29)
HCV AB SERPL QL IA: NONREACTIVE
HDLC SERPL-MCNC: 71 MG/DL
LDLC SERPL CALC-MCNC: 90 MG/DL
MICROALBUMIN UR-MCNC: <12 MG/L
MICROALBUMIN/CREAT UR: NORMAL MG/G{CREAT}
NONHDLC SERPL-MCNC: 100 MG/DL
POTASSIUM SERPL-SCNC: 4.2 MMOL/L (ref 3.4–5.3)
PROT SERPL-MCNC: 7.2 G/DL (ref 6.4–8.3)
SODIUM SERPL-SCNC: 139 MMOL/L (ref 135–145)
TRIGL SERPL-MCNC: 48 MG/DL

## 2024-10-07 ENCOUNTER — E-CONSULT (OUTPATIENT)
Dept: MULTI SPECIALTY CLINIC | Facility: CLINIC | Age: 49
End: 2024-10-07
Payer: COMMERCIAL

## 2024-10-07 DIAGNOSIS — R79.89 ELEVATED SERUM CREATININE: Primary | ICD-10-CM

## 2024-10-07 PROCEDURE — 99451 NTRPROF PH1/NTRNET/EHR 5/>: CPT | Performed by: STUDENT IN AN ORGANIZED HEALTH CARE EDUCATION/TRAINING PROGRAM

## 2024-10-07 NOTE — PROGRESS NOTES
10/7/2024     E-Consult has been accepted.    Interprofessional consultation requested by:  Amanda Alegre DO      Clinical Question/Purpose: MY CLINICAL QUESTION IS: Patient has had slightly elevated creatinine for last several years- cystatin c level is normal, do you think this could indicate any other underlying kidney disease/warrants any additional work up or since cystatin c is normal ok to monitor annually?    Patient assessment and information reviewed: This is a 49 year old male with h/o PAF (not on anticoag) and creatinine of 1.2 mg/dL dating back to at least 2021 with his (second) bump up in creatinine from baseline to 1.4-1.5 mg/dL. UA is bland. UACR and UPCR are both negative. BP is normotensive with only Rx is an old diltiazem Rx. Cystatin C eGFR is >90. SPEP and spot UPEP checked and were negative for monoclonal protein.    Recommendations: I agree with plan stated in recent note by ordering provider to check a renal U/S (no need for doppler).     Normal cystatin C-eGFR is reassuring, as is bland UA.     SPEP and UPEP both negative. To complete that screening workup, also check serum free light chains (serum kappa and lambda, looking to see if the ratio of the two is outside of the normal range - ok if both are elevated or low, but the ratio should be normal). If abnormal ratio, consider Heme/Onc referral.     I would suggest rechecking the BMP soon to ensure creatinine doesn't keep rising or even returns to his prior baseline. If creatinine stays in this 1.2-1.5 range, I agree with suggested plan to monitor yearly (and as needed, based on presentation). If creatinine rises further above this, refer to nephrology.       The recommendations provided in this E-Consult are based on a review of clinical data pertinent to the clinical question presented, without a review of the patient's complete medical record or, the benefit of a comprehensive in-person or virtual patient evaluation. This  consultation should not replace the clinical judgement and evaluation of the provider ordering this E-Consult. Any new clinical issues, or changes in patient status since the filing of this E-Consult will need to be taken into account when assessing these recommendations. Please contact me if you have further questions.    My total time spent reviewing clinical information and formulating assessment was 10 minutes.        Kris Ellis MD  Nephrology

## 2025-03-11 ENCOUNTER — TELEPHONE (OUTPATIENT)
Dept: CARDIOLOGY | Facility: CLINIC | Age: 50
End: 2025-03-11
Payer: COMMERCIAL

## 2025-03-11 NOTE — TELEPHONE ENCOUNTER
Left Voicemail (1st Attempt) for the patient to call back and schedule the following:    Appointment type:  rtn ep   Provider: dario   Return date: 06/04/25  Specialty phone number: [phone] opt 1   Additional appointment(s) needed:  n/a   Additonal Notes: n/a

## 2025-09-01 ENCOUNTER — PATIENT OUTREACH (OUTPATIENT)
Dept: CARE COORDINATION | Facility: CLINIC | Age: 50
End: 2025-09-01
Payer: COMMERCIAL

## (undated) DEVICE — NDL TRANSSEPTAL BRK XS 18GA 71CM BRK-1 CVD G407209

## (undated) DEVICE — CATH THERMOCOOL SMARTTOUCH SF DF CURVE

## (undated) DEVICE — REPRO BARD EP XT DECAPL STRBL DX EP CATH 6F

## (undated) DEVICE — KIT MICROINTRODUCER VASCULAR VSI 4FRX0.018INX40CM 7195X

## (undated) DEVICE — TUBE SET SMARKABLATE IRRIGATION

## (undated) DEVICE — INTRODUCER SHEATH FAST-CATH CATH-LOCK 7FRX12CM 406702

## (undated) DEVICE — CATH SOUNDSTAR 8FRX90CM 10439011

## (undated) DEVICE — ELECTRODE DEFIB CADENCE 22550R

## (undated) DEVICE — CATH MAPPING 7FR D-CURVE PENTARAY NAV D128211

## (undated) DEVICE — GUIDEWIRE PROTRACK PGTL .025IN DIA 23

## (undated) DEVICE — PATCH CARTO 3 EXTERNAL REFERENCE 3D MAPPING CREFP6

## (undated) DEVICE — INTRO SHEATH 4FRX10CM PINNACLE RSS402

## (undated) DEVICE — KIT VENOUS FLUSH 60210177

## (undated) DEVICE — INTRODUCER SHEATH FAST-CATH SWARTZ 8.5FRX63CM SL1 CVD 406849

## (undated) DEVICE — INTRO CATH 12CM 8.5FR FST-CATH

## (undated) DEVICE — PACK HEART LEFT CUSTOM

## (undated) DEVICE — INTRODUCER SHEATH FAST-CATH 9FRX12CM 406116

## (undated) RX ORDER — PROPOFOL 10 MG/ML
INJECTION, EMULSION INTRAVENOUS
Status: DISPENSED
Start: 2023-12-21

## (undated) RX ORDER — HEPARIN SODIUM 1000 [USP'U]/ML
INJECTION, SOLUTION INTRAVENOUS; SUBCUTANEOUS
Status: DISPENSED
Start: 2023-12-21

## (undated) RX ORDER — METHYLPREDNISOLONE SODIUM SUCCINATE 125 MG/2ML
INJECTION, POWDER, LYOPHILIZED, FOR SOLUTION INTRAMUSCULAR; INTRAVENOUS
Status: DISPENSED
Start: 2023-12-21

## (undated) RX ORDER — FENTANYL CITRATE 50 UG/ML
INJECTION, SOLUTION INTRAMUSCULAR; INTRAVENOUS
Status: DISPENSED
Start: 2023-12-21

## (undated) RX ORDER — ONDANSETRON 2 MG/ML
INJECTION INTRAMUSCULAR; INTRAVENOUS
Status: DISPENSED
Start: 2023-12-21

## (undated) RX ORDER — EPHEDRINE SULFATE 50 MG/ML
INJECTION, SOLUTION INTRAMUSCULAR; INTRAVENOUS; SUBCUTANEOUS
Status: DISPENSED
Start: 2023-12-21

## (undated) RX ORDER — LIDOCAINE HYDROCHLORIDE 20 MG/ML
SOLUTION OROPHARYNGEAL
Status: DISPENSED
Start: 2023-09-08

## (undated) RX ORDER — PROTAMINE SULFATE 10 MG/ML
INJECTION, SOLUTION INTRAVENOUS
Status: DISPENSED
Start: 2023-12-21

## (undated) RX ORDER — FENTANYL CITRATE-0.9 % NACL/PF 10 MCG/ML
PLASTIC BAG, INJECTION (ML) INTRAVENOUS
Status: DISPENSED
Start: 2023-12-21

## (undated) RX ORDER — FENTANYL CITRATE 50 UG/ML
INJECTION, SOLUTION INTRAMUSCULAR; INTRAVENOUS
Status: DISPENSED
Start: 2023-09-08

## (undated) RX ORDER — DIPHENHYDRAMINE HYDROCHLORIDE 50 MG/ML
INJECTION INTRAMUSCULAR; INTRAVENOUS
Status: DISPENSED
Start: 2023-12-21